# Patient Record
Sex: MALE | Race: WHITE | NOT HISPANIC OR LATINO | Employment: OTHER | URBAN - METROPOLITAN AREA
[De-identification: names, ages, dates, MRNs, and addresses within clinical notes are randomized per-mention and may not be internally consistent; named-entity substitution may affect disease eponyms.]

---

## 2021-08-03 ENCOUNTER — TELEPHONE (OUTPATIENT)
Dept: TRANSPLANT | Facility: CLINIC | Age: 61
End: 2021-08-03

## 2021-08-03 DIAGNOSIS — Z76.82 ORGAN TRANSPLANT CANDIDATE: Primary | ICD-10-CM

## 2021-08-04 ENCOUNTER — TELEPHONE (OUTPATIENT)
Dept: TRANSPLANT | Facility: CLINIC | Age: 61
End: 2021-08-04

## 2021-08-11 ENCOUNTER — TELEPHONE (OUTPATIENT)
Dept: TRANSPLANT | Facility: CLINIC | Age: 61
End: 2021-08-11

## 2021-08-12 ENCOUNTER — TELEPHONE (OUTPATIENT)
Dept: TRANSPLANT | Facility: CLINIC | Age: 61
End: 2021-08-12

## 2021-09-02 ENCOUNTER — TELEPHONE (OUTPATIENT)
Dept: TRANSPLANT | Facility: CLINIC | Age: 61
End: 2021-09-02

## 2021-09-13 ENCOUNTER — TELEPHONE (OUTPATIENT)
Dept: TRANSPLANT | Facility: CLINIC | Age: 61
End: 2021-09-13

## 2021-10-06 ENCOUNTER — OFFICE VISIT (OUTPATIENT)
Dept: TRANSPLANT | Facility: CLINIC | Age: 61
End: 2021-10-06
Payer: COMMERCIAL

## 2021-10-06 ENCOUNTER — HOSPITAL ENCOUNTER (OUTPATIENT)
Dept: RADIOLOGY | Facility: HOSPITAL | Age: 61
Discharge: HOME | End: 2021-10-06
Attending: NURSE PRACTITIONER
Payer: COMMERCIAL

## 2021-10-06 VITALS
HEART RATE: 72 BPM | OXYGEN SATURATION: 100 % | DIASTOLIC BLOOD PRESSURE: 86 MMHG | TEMPERATURE: 98 F | SYSTOLIC BLOOD PRESSURE: 138 MMHG | RESPIRATION RATE: 16 BRPM | WEIGHT: 207.88 LBS | HEIGHT: 70 IN | BODY MASS INDEX: 29.76 KG/M2

## 2021-10-06 DIAGNOSIS — N18.4 CKD (CHRONIC KIDNEY DISEASE) STAGE 4, GFR 15-29 ML/MIN: ICD-10-CM

## 2021-10-06 DIAGNOSIS — Z76.82 ORGAN TRANSPLANT CANDIDATE: ICD-10-CM

## 2021-10-06 DIAGNOSIS — I10 BENIGN HYPERTENSION: ICD-10-CM

## 2021-10-06 DIAGNOSIS — E78.5 DYSLIPIDEMIA: ICD-10-CM

## 2021-10-06 DIAGNOSIS — N02.B9 IGA NEPHROPATHY: Primary | ICD-10-CM

## 2021-10-06 DIAGNOSIS — R80.9 PROTEINURIA, UNSPECIFIED TYPE: ICD-10-CM

## 2021-10-06 PROBLEM — E11.29 TYPE 2 DIABETES MELLITUS WITH RENAL COMPLICATION: Status: ACTIVE | Noted: 2021-10-06

## 2021-10-06 LAB
CREAT UR-MCNC: 35 MG/DL (ref 23–375)
PROT UR-MCNC: 197 MG/DL (ref 0–15)
PROT/CREAT UR: 5.63 MG/G{CREAT} (ref 0–0.2)

## 2021-10-06 PROCEDURE — 97802 PR MED NUTR THER, 1ST, INDIV, EA 15 MIN: ICD-10-PCS | Mod: S$GLB,TXP,, | Performed by: DIETITIAN, REGISTERED

## 2021-10-06 PROCEDURE — 76770 US EXAM ABDO BACK WALL COMP: CPT | Mod: 26,TXP,, | Performed by: RADIOLOGY

## 2021-10-06 PROCEDURE — 97802 MEDICAL NUTRITION INDIV IN: CPT | Mod: S$GLB,TXP,, | Performed by: DIETITIAN, REGISTERED

## 2021-10-06 PROCEDURE — 99205 OFFICE O/P NEW HI 60 MIN: CPT | Mod: S$GLB,TXP,, | Performed by: INTERNAL MEDICINE

## 2021-10-06 PROCEDURE — 71046 X-RAY EXAM CHEST 2 VIEWS: CPT | Mod: TC,TXP

## 2021-10-06 PROCEDURE — 99999 PR PBB SHADOW E&M-EST. PATIENT-LVL IV: CPT | Mod: PBBFAC,TXP,, | Performed by: INTERNAL MEDICINE

## 2021-10-06 PROCEDURE — 72170 X-RAY EXAM OF PELVIS: CPT | Mod: TC,TXP

## 2021-10-06 PROCEDURE — 99203 OFFICE O/P NEW LOW 30 MIN: CPT | Mod: S$GLB,TXP,, | Performed by: TRANSPLANT SURGERY

## 2021-10-06 PROCEDURE — 72170 X-RAY EXAM OF PELVIS: CPT | Mod: 26,TXP,, | Performed by: RADIOLOGY

## 2021-10-06 PROCEDURE — 93978 VASCULAR STUDY: CPT | Mod: 26,TXP,, | Performed by: RADIOLOGY

## 2021-10-06 PROCEDURE — 99999 PR PBB SHADOW E&M-EST. PATIENT-LVL IV: ICD-10-PCS | Mod: PBBFAC,TXP,, | Performed by: INTERNAL MEDICINE

## 2021-10-06 PROCEDURE — 93978 VASCULAR STUDY: CPT | Mod: TC,TXP

## 2021-10-06 PROCEDURE — 99203 PR OFFICE/OUTPT VISIT, NEW, LEVL III, 30-44 MIN: ICD-10-PCS | Mod: S$GLB,TXP,, | Performed by: TRANSPLANT SURGERY

## 2021-10-06 PROCEDURE — 76770 US RETROPERITONEAL COMPLETE: ICD-10-PCS | Mod: 26,TXP,, | Performed by: RADIOLOGY

## 2021-10-06 PROCEDURE — 72170 XR PELVIS ROUTINE AP: ICD-10-PCS | Mod: 26,TXP,, | Performed by: RADIOLOGY

## 2021-10-06 PROCEDURE — 99205 PR OFFICE/OUTPT VISIT, NEW, LEVL V, 60-74 MIN: ICD-10-PCS | Mod: S$GLB,TXP,, | Performed by: INTERNAL MEDICINE

## 2021-10-06 PROCEDURE — 71046 XR CHEST PA AND LATERAL: ICD-10-PCS | Mod: 26,TXP,, | Performed by: RADIOLOGY

## 2021-10-06 PROCEDURE — 93978 US DOPP ILIACS BILATERAL: ICD-10-PCS | Mod: 26,TXP,, | Performed by: RADIOLOGY

## 2021-10-06 PROCEDURE — 76770 US EXAM ABDO BACK WALL COMP: CPT | Mod: 59,TC,TXP

## 2021-10-06 PROCEDURE — 71046 X-RAY EXAM CHEST 2 VIEWS: CPT | Mod: 26,TXP,, | Performed by: RADIOLOGY

## 2021-10-06 PROCEDURE — 82570 ASSAY OF URINE CREATININE: CPT | Mod: TXP | Performed by: STUDENT IN AN ORGANIZED HEALTH CARE EDUCATION/TRAINING PROGRAM

## 2021-10-06 RX ORDER — LOSARTAN POTASSIUM AND HYDROCHLOROTHIAZIDE 25; 100 MG/1; MG/1
1 TABLET ORAL DAILY
COMMUNITY
Start: 2021-04-08 | End: 2022-12-27

## 2021-10-06 RX ORDER — CLONAZEPAM 1 MG/1
1 TABLET ORAL NIGHTLY PRN
COMMUNITY
Start: 2021-08-19 | End: 2023-12-18

## 2021-10-06 RX ORDER — CARVEDILOL 25 MG/1
25 TABLET ORAL 2 TIMES DAILY
Status: ON HOLD | COMMUNITY
Start: 2021-09-12 | End: 2023-10-17 | Stop reason: SDUPTHER

## 2021-10-06 RX ORDER — PREDNISONE 5 MG/1
5 TABLET ORAL DAILY
COMMUNITY
Start: 2021-09-14 | End: 2022-12-27

## 2021-10-06 RX ORDER — FUROSEMIDE 40 MG/1
40 TABLET ORAL DAILY
Status: ON HOLD | COMMUNITY
Start: 2021-09-30 | End: 2023-10-17 | Stop reason: HOSPADM

## 2021-10-06 RX ORDER — INSULIN DEGLUDEC INJECTION 100 U/ML
10 INJECTION, SOLUTION SUBCUTANEOUS DAILY
Status: ON HOLD | COMMUNITY
Start: 2021-09-17 | End: 2023-10-17 | Stop reason: HOSPADM

## 2021-10-18 ENCOUNTER — SOCIAL WORK (OUTPATIENT)
Dept: TRANSPLANT | Facility: CLINIC | Age: 61
End: 2021-10-18

## 2021-11-08 DIAGNOSIS — Z76.82 ORGAN TRANSPLANT CANDIDATE: Primary | ICD-10-CM

## 2021-11-09 ENCOUNTER — TELEPHONE (OUTPATIENT)
Dept: TRANSPLANT | Facility: CLINIC | Age: 61
End: 2021-11-09
Payer: COMMERCIAL

## 2021-11-10 ENCOUNTER — LAB VISIT (OUTPATIENT)
Dept: LAB | Facility: HOSPITAL | Age: 61
End: 2021-11-10
Payer: COMMERCIAL

## 2021-11-10 DIAGNOSIS — Z76.82 ORGAN TRANSPLANT CANDIDATE: ICD-10-CM

## 2021-11-10 PROCEDURE — 86825 HLA X-MATH NON-CYTOTOXIC: CPT | Mod: 91,PO,TXP | Performed by: NURSE PRACTITIONER

## 2021-11-10 PROCEDURE — 86825 HLA X-MATH NON-CYTOTOXIC: CPT | Mod: PO,TXP | Performed by: NURSE PRACTITIONER

## 2021-11-18 LAB
B CELL RESULTS - XM ALLO: NEGATIVE
B MCS AVERAGE - XM ALLO: -7.7
DONOR MRN: NORMAL
FXMAL TESTING DATE: NORMAL
HLA FLOW CROSSMATCH (ALLO) INTERPRETATION: NORMAL
SERUM COLLECTION DT - XM ALLO: NORMAL
T CELL RESULTS - XM ALLO: NEGATIVE
T MCS AVERAGE - XM ALLO: -6.1

## 2021-11-24 ENCOUNTER — TELEPHONE (OUTPATIENT)
Dept: TRANSPLANT | Facility: CLINIC | Age: 61
End: 2021-11-24
Payer: COMMERCIAL

## 2021-11-24 DIAGNOSIS — Z76.82 ORGAN TRANSPLANT CANDIDATE: Primary | ICD-10-CM

## 2021-12-13 ENCOUNTER — EPISODE CHANGES (OUTPATIENT)
Dept: TRANSPLANT | Facility: CLINIC | Age: 61
End: 2021-12-13

## 2022-01-12 ENCOUNTER — TELEPHONE (OUTPATIENT)
Dept: TRANSPLANT | Facility: CLINIC | Age: 62
End: 2022-01-12
Payer: COMMERCIAL

## 2022-01-12 NOTE — TELEPHONE ENCOUNTER
Detail Level: Detailed Returned pt's call. This Rn explained to the pt the urology notes we rcv'd do not comment on clearance for transplant. Also made him aware we need cardiology clearance even though we have rcv'd the testing results. The pt voiced understanding and all questions were answered.   Quality 110: Preventive Care And Screening: Influenza Immunization: Influenza Immunization previously received during influenza season

## 2022-01-12 NOTE — TELEPHONE ENCOUNTER
----- Message from Nathaniel Alanis sent at 1/12/2022  9:38 AM CST -----  Regarding: call back  Pt call to Ritu in regards to receiving some test paperwork requesting call back    Call

## 2022-01-18 ENCOUNTER — TELEPHONE (OUTPATIENT)
Dept: TRANSPLANT | Facility: CLINIC | Age: 62
End: 2022-01-18
Payer: COMMERCIAL

## 2022-01-18 NOTE — TELEPHONE ENCOUNTER
Returned pt's call. Made him aware we are requesting clearance from cards and urology as well. The pt stated he spoke to both doctors and both should be sending clearance.

## 2022-01-18 NOTE — TELEPHONE ENCOUNTER
----- Message from Ritu Mcclelland RN sent at 1/14/2022  4:30 PM CST -----  Regarding: FW: clearance letter    ----- Message -----  From: Eloina Banda  Sent: 1/14/2022  11:49 AM CST  To: Ritu Mcclelland RN  Subject: FW: clearance letter                               ----- Message -----  From: Sarita Grullon  Sent: 1/14/2022  11:01 AM CST  To: [facility] Pre-Kidney Transplant Non-Clinical  Subject: clearance letter                                 Patient states that [facility],  [phone], is giving him a lot of trouble about getting a clearance letter from them.  Patient is wondering if his coordinator can give them a call to help rectify the situation.

## 2022-01-24 ENCOUNTER — EPISODE CHANGES (OUTPATIENT)
Dept: TRANSPLANT | Facility: CLINIC | Age: 62
End: 2022-01-24

## 2022-01-31 ENCOUNTER — TELEPHONE (OUTPATIENT)
Dept: TRANSPLANT | Facility: CLINIC | Age: 62
End: 2022-01-31
Payer: COMMERCIAL

## 2022-01-31 NOTE — TELEPHONE ENCOUNTER
Returned patient's call. Made him aware I will present his case on 2/4/22 and I will call him to let him know the decision. The pt voiced understanding and all questions were answered.

## 2022-01-31 NOTE — TELEPHONE ENCOUNTER
----- Message from Nathaniel Alanis sent at 1/31/2022  3:07 PM CST -----  Regarding: call back  Pt call in regards to transplant status    Call

## 2022-02-04 ENCOUNTER — TELEPHONE (OUTPATIENT)
Dept: TRANSPLANT | Facility: CLINIC | Age: 62
End: 2022-02-04
Payer: COMMERCIAL

## 2022-02-04 ENCOUNTER — COMMITTEE REVIEW (OUTPATIENT)
Dept: TRANSPLANT | Facility: CLINIC | Age: 62
End: 2022-02-04
Payer: COMMERCIAL

## 2022-02-04 NOTE — LETTER
February 4, 2022    Jannet Farias  [address] [place] [address]     Dear :    Patient: Heath Hernandez   MR Number: [id number]   YOB: 1960     Your patient, Heath Hernandez, was recently discussed at the [facility] Selection Committee meeting on . I am happy to inform you that Heath has been approved for transplantation.  He has met selection criteria for a kidney transplant related to CKD stage 4 secondary to primary diagnosis of Diabetes Mellitus - Type II. Your patient will be placed on the cadaveric wait list pending final financial approval from insurance company.     We appreciate your confidence in allowing us to participate in your patients care.  If you have any questions or concerns, please do not hesitate to contact me.    Sincerely,      Shena Gordon MD  Medical Director, Kidney & Kidney/Pancreas Transplantation    Tj/Enclosed    Cc:Jannet Farias MD

## 2022-02-04 NOTE — COMMITTEE REVIEW
Native Organ Dx: Diabetes Mellitus - Type II      SELECTION COMMITTEE NOTE    Heath Hernandez was presented at selection committee on 2/4/2022 .  Patient met selection criteria for kidney transplant related to CKD, Stage 4 due to  Diabetes Mellitus - Type II.  No absolute contraindications to transplant at this time.  Patient will be placed on the cadaveric wait list pending final financial approval from insurance company.  Patient will return to clinic for routine appointment in 1 year(s). Patient does not meet criteria for High KDPI kidney offer. Patient meets HCV+ kidney offer. Patient does not meet criteria for dual/enbloc, due to weight.      Notified the patient of the committee's decision. Verified home address and nephrologist. Will sent order to patient and nephrologist for second abo and request to have tubes sent to patient at time of listing for hla. Patient voiced understanding and all questions were answered.    Note written by MIKE Irene, RN    ===============================================    I was present at the meeting and attest to the decision of the committee

## 2022-02-12 LAB
ABO GROUP BLD: NORMAL
RH BLD: POSITIVE

## 2022-03-02 ENCOUNTER — TELEPHONE (OUTPATIENT)
Dept: TRANSPLANT | Facility: CLINIC | Age: 62
End: 2022-03-02
Payer: COMMERCIAL

## 2022-03-02 DIAGNOSIS — Z76.82 ORGAN TRANSPLANT CANDIDATE: Primary | ICD-10-CM

## 2022-03-02 NOTE — TELEPHONE ENCOUNTER
"  KIDNEY WAIT LISTING NOTE    Date of Financial clearance to list: 22    N/[facility]:     Organ: Kidney  Name:       Heath Hernandez   : 1960          Gender:     male    MRN#: [id number]                                 State of Permanent Residence:  [address] [place] [id number]  Ethnicity: Not  or /a   Race:      White    CLINICAL INFORMATION   Candidate Medical Urgency Status: Active (1)  Number of Previous Kidney Transplants: 0  Number of Previous Solid Organ Transplants: 0  Did you enter number of previous kidney or other solid organ transplants? yes  Is this Candidate a Prior Living Donor: no  (If yes, please generate letter to [employer] with patient's date of donation, recipient SSN, signed by Surgical Director after patient is listed in order to receive priority points).      ABO  ABO Blood Group:   O     ABO Confirmation: (THESE DATES MUST BE PRIOR TO THE LIST DATE AND SUPPORTED BY SEPARATE LAB REPORTS)    Internal Results    Lab Results   Component Value Date    GROUPTRH O POS 10/06/2021     Lab Results   Component Value Date    ABO O 2022       External Results    ABO Date 1: 2022   ABO Date 2  Are either of these ABO results based on External Labs? yes  (If Yes, STOP and go to source document in Media Tab for verification).    VITALS  Height:  5'9"  Weight:  94.3kg  (Use height from Transplant clinic visits only).  Did you enter height/weight? Yes    HLA    Class I:  Lab Results   Component Value Date    ZRWT0QL 1 10/06/2021    XTTG1UW 25 10/06/2021    DHKV7GG 44 10/06/2021    ROTE0QG 57 10/06/2021    YYXWA3IQ 4 10/06/2021    LYBFS5WA XX 10/06/2021    GYNYI1QQ 5 10/06/2021    CEQGX7WF 7 10/06/2021       Class II:  Lab Results   Component Value Date    ONJQYO78MK 4 10/06/2021    AHCNXH91UP 7 10/06/2021    JFTDUA283XL 53 10/06/2021    RLELFS8499 XX 10/06/2021    NEJTI9GO 7 10/06/2021    BDVHC5PN 9 10/06/2021       Tested for HLA Antibodies: Yes, no antibodies " "detected     If result is "Positive" antibodies are detected     If result is "Negative or questionable" no antibodies detected    Lab Results   Component Value Date    CIPRAS Negative 10/06/2021    CIIPRAS Negative 10/06/2021       DIALYSIS INFORMATION  Is patient Pre-Dialysis: Yes     GFR Information  Report GFR being used as the criteria for placement on the kidney list. If not, leave blank  GFR < or = 20 ml/min? Yes  If Yes, Specify value  17  ml/min     Initial date GFR became 20 or less: 10/6/2021  Is GFR obtained from an Outside lab Result? No  (If YES verify with source document scanned into media)    If patient on Dialysis:    Is candidate currently on dialysis for ESRD? No  If Yes,  Date Chronic Dialysis Started:   (Not currently on dialysis)  (verify with source document in Media Tab)   Dialysis Unit Name: Pre Dialysis [facility] draw                        Physician Name:  Dr. Shena Hillman  [facility]#: [id number]    DIABETES INFORMATION  Primary Native Kidney Diagnosis: Diabetes Mellitus - Type II  C-Peptide Value - No results found for: CPEPTIDE  Current Diabetes Status: None    FOR NON-KIDNEY DEPARTMENT USE ONLY:  Additional Organs Registered? none    Maximum Acceptable Number of HLA Mismatches  ABDR:     6      (0-6)               AB:               (0-4)  ADR:   _____  (0-4)              BDR: _____ (0-4)  A:        _____  (0-2)              B:      _____ (0-2)          DR: ______ (0-2)    Will Recipient Accept?   Accept HBcAB Positive Organ:            Yes  Accept HBV IKE Positive Organ:        no  Accept HCV Antibody Positive Organ: yes   Accept HCV IKE Positive Organ: yes    Dual Kidney and En Bloc Opt In : No  Dual  Local:   No  Dual Import:   No  En Bloc Local:   No  En Bloc Import: No     Accept KDPI > 85: Single: No     Local: No     Import: No  Accept KDPI > 85: Dual: No     Local: No     Import: No      ### NURSE TO VERIFY CONSENT AND MAKE ANY NECESSARY CHANGES NEEDED IN UNET AT THE TIME " OF VERIFICATION ###    Unacceptible Antigens  If yes, list     No results found for: NH5SFNA, CIABCLM, CIIAB    ### DO NOT LIST IF ANTIGEN VALUE WEAK ###     abo, serologies and egfr verified by myself and NICKY Solomon RN

## 2022-03-03 DIAGNOSIS — Z76.82 ORGAN TRANSPLANT CANDIDATE: Primary | ICD-10-CM

## 2022-04-19 PROCEDURE — 86832 HLA CLASS I HIGH DEFIN QUAL: CPT | Mod: PO,TXP | Performed by: NURSE PRACTITIONER

## 2022-04-19 PROCEDURE — 86833 HLA CLASS II HIGH DEFIN QUAL: CPT | Mod: PO,TXP | Performed by: NURSE PRACTITIONER

## 2022-04-21 ENCOUNTER — LAB VISIT (OUTPATIENT)
Dept: LAB | Facility: HOSPITAL | Age: 62
End: 2022-04-21
Payer: COMMERCIAL

## 2022-04-21 DIAGNOSIS — Z76.82 ORGAN TRANSPLANT CANDIDATE: ICD-10-CM

## 2022-05-05 ENCOUNTER — TELEPHONE (OUTPATIENT)
Dept: TRANSPLANT | Facility: CLINIC | Age: 62
End: 2022-05-05
Payer: COMMERCIAL

## 2022-05-05 DIAGNOSIS — Z76.82 ORGAN TRANSPLANT CANDIDATE: Primary | ICD-10-CM

## 2022-05-11 LAB — HPRA INTERPRETATION: NORMAL

## 2022-05-17 PROCEDURE — 99001 SPECIMEN HANDLING PT-LAB: CPT | Mod: PO,TXP | Performed by: NURSE PRACTITIONER

## 2022-05-19 ENCOUNTER — LAB VISIT (OUTPATIENT)
Dept: LAB | Facility: HOSPITAL | Age: 62
End: 2022-05-19
Payer: COMMERCIAL

## 2022-05-19 DIAGNOSIS — Z76.82 ORGAN TRANSPLANT CANDIDATE: ICD-10-CM

## 2022-06-01 LAB
CLASS I ANTIBODIES - LUMINEX: NEGATIVE
CLASS II ANTIBODIES - LUMINEX: NORMAL
CLASS II ANTIBODY COMMENTS - LUMINEX: NORMAL
CPRA %: 0
SERUM COLLECTION DT - LUMINEX CLASS I: NORMAL
SERUM COLLECTION DT - LUMINEX CLASS II: NORMAL
SPCL1 TESTING DATE: NORMAL
SPCL2 TESTING DATE: NORMAL
SPCLU TESTING DATE: NORMAL

## 2022-06-08 LAB
HLA FREEZE AND HOLD INTERPRETATION: NORMAL
HLAFH COLLECTION DATE: NORMAL
HPRA INTERPRETATION: NORMAL

## 2022-06-27 PROCEDURE — 86833 HLA CLASS II HIGH DEFIN QUAL: CPT | Mod: PO,TXP | Performed by: NURSE PRACTITIONER

## 2022-06-27 PROCEDURE — 86832 HLA CLASS I HIGH DEFIN QUAL: CPT | Mod: PO,TXP | Performed by: NURSE PRACTITIONER

## 2022-06-30 ENCOUNTER — LAB VISIT (OUTPATIENT)
Dept: LAB | Facility: HOSPITAL | Age: 62
End: 2022-06-30
Payer: COMMERCIAL

## 2022-06-30 DIAGNOSIS — Z76.82 ORGAN TRANSPLANT CANDIDATE: ICD-10-CM

## 2022-07-11 LAB — HPRA INTERPRETATION: NORMAL

## 2022-07-18 PROCEDURE — 99001 SPECIMEN HANDLING PT-LAB: CPT | Mod: PO,TXP | Performed by: NURSE PRACTITIONER

## 2022-07-20 ENCOUNTER — LAB VISIT (OUTPATIENT)
Dept: LAB | Facility: HOSPITAL | Age: 62
End: 2022-07-20
Payer: COMMERCIAL

## 2022-07-20 DIAGNOSIS — Z76.82 ORGAN TRANSPLANT CANDIDATE: ICD-10-CM

## 2022-07-29 LAB
CLASS I ANTIBODY COMMENTS - LUMINEX: NORMAL
CLASS II ANTIBODIES - LUMINEX: NORMAL
CLASS II ANTIBODY COMMENTS - LUMINEX: NORMAL
CPRA %: 0
SERUM COLLECTION DT - LUMINEX CLASS I: NORMAL
SERUM COLLECTION DT - LUMINEX CLASS II: NORMAL
SPCL1 TESTING DATE: NORMAL
SPCL2 TESTING DATE: NORMAL
SPCLU TESTING DATE: NORMAL

## 2022-08-29 PROCEDURE — 86832 HLA CLASS I HIGH DEFIN QUAL: CPT | Mod: PO,TXP | Performed by: NURSE PRACTITIONER

## 2022-08-29 PROCEDURE — 86833 HLA CLASS II HIGH DEFIN QUAL: CPT | Mod: PO,TXP | Performed by: NURSE PRACTITIONER

## 2022-09-01 ENCOUNTER — LAB VISIT (OUTPATIENT)
Dept: LAB | Facility: HOSPITAL | Age: 62
End: 2022-09-01
Payer: COMMERCIAL

## 2022-09-01 DIAGNOSIS — Z76.82 ORGAN TRANSPLANT CANDIDATE: ICD-10-CM

## 2022-09-08 LAB — HPRA INTERPRETATION: NORMAL

## 2022-09-21 PROCEDURE — 99001 SPECIMEN HANDLING PT-LAB: CPT | Mod: PO,TXP | Performed by: NURSE PRACTITIONER

## 2022-09-23 ENCOUNTER — LAB VISIT (OUTPATIENT)
Dept: LAB | Facility: HOSPITAL | Age: 62
End: 2022-09-23
Payer: COMMERCIAL

## 2022-09-23 DIAGNOSIS — Z76.82 ORGAN TRANSPLANT CANDIDATE: ICD-10-CM

## 2022-09-26 DIAGNOSIS — Z76.82 ORGAN TRANSPLANT CANDIDATE: Primary | ICD-10-CM

## 2022-09-26 NOTE — PROGRESS NOTES
YEARLY LIST MANAGEMENT NOTE    Heath David's kidney transplant listing status reviewed.  Patient is due for follow-up appointments in December 2022.  Appointments will be scheduled per protocol.  HLA sample is current and being rec'd on a regular basis.

## 2022-09-28 ENCOUNTER — TELEPHONE (OUTPATIENT)
Dept: TRANSPLANT | Facility: CLINIC | Age: 62
End: 2022-09-28
Payer: COMMERCIAL

## 2022-09-30 ENCOUNTER — TELEPHONE (OUTPATIENT)
Dept: TRANSPLANT | Facility: CLINIC | Age: 62
End: 2022-09-30
Payer: COMMERCIAL

## 2022-10-04 PROCEDURE — 86832 HLA CLASS I HIGH DEFIN QUAL: CPT | Mod: PO,TXP | Performed by: NURSE PRACTITIONER

## 2022-10-04 PROCEDURE — 86833 HLA CLASS II HIGH DEFIN QUAL: CPT | Mod: PO,TXP | Performed by: NURSE PRACTITIONER

## 2022-10-11 ENCOUNTER — LAB VISIT (OUTPATIENT)
Dept: LAB | Facility: HOSPITAL | Age: 62
End: 2022-10-11
Payer: COMMERCIAL

## 2022-10-11 DIAGNOSIS — Z76.82 ORGAN TRANSPLANT CANDIDATE: ICD-10-CM

## 2022-10-14 LAB — HPRA INTERPRETATION: NORMAL

## 2022-11-01 PROCEDURE — 99001 SPECIMEN HANDLING PT-LAB: CPT | Mod: PO,TXP | Performed by: NURSE PRACTITIONER

## 2022-11-03 ENCOUNTER — LAB VISIT (OUTPATIENT)
Dept: LAB | Facility: HOSPITAL | Age: 62
End: 2022-11-03
Payer: COMMERCIAL

## 2022-11-03 DIAGNOSIS — Z76.82 ORGAN TRANSPLANT CANDIDATE: ICD-10-CM

## 2022-11-15 LAB
CLASS I ANTIBODY COMMENTS - LUMINEX: NORMAL
CLASS II ANTIBODIES - LUMINEX: NEGATIVE
CPRA %: 0
SERUM COLLECTION DT - LUMINEX CLASS I: NORMAL
SERUM COLLECTION DT - LUMINEX CLASS II: NORMAL
SPCL1 TESTING DATE: NORMAL
SPCL2 TESTING DATE: NORMAL
SPCLU TESTING DATE: NORMAL

## 2022-11-30 ENCOUNTER — TELEPHONE (OUTPATIENT)
Dept: TRANSPLANT | Facility: CLINIC | Age: 62
End: 2022-11-30
Payer: COMMERCIAL

## 2022-12-27 ENCOUNTER — OFFICE VISIT (OUTPATIENT)
Dept: TRANSPLANT | Facility: CLINIC | Age: 62
End: 2022-12-27
Payer: COMMERCIAL

## 2022-12-27 ENCOUNTER — HOSPITAL ENCOUNTER (OUTPATIENT)
Dept: RADIOLOGY | Facility: HOSPITAL | Age: 62
Discharge: HOME | End: 2022-12-27
Attending: NURSE PRACTITIONER
Payer: COMMERCIAL

## 2022-12-27 ENCOUNTER — TELEPHONE (OUTPATIENT)
Dept: TRANSPLANT | Facility: CLINIC | Age: 62
End: 2022-12-27
Payer: COMMERCIAL

## 2022-12-27 ENCOUNTER — HOSPITAL ENCOUNTER (OUTPATIENT)
Dept: CARDIOLOGY | Facility: HOSPITAL | Age: 62
Discharge: HOME | End: 2022-12-27
Attending: NURSE PRACTITIONER
Payer: COMMERCIAL

## 2022-12-27 VITALS
WEIGHT: 207.88 LBS | BODY MASS INDEX: 30.79 KG/M2 | HEART RATE: 77 BPM | SYSTOLIC BLOOD PRESSURE: 130 MMHG | HEIGHT: 69 IN | DIASTOLIC BLOOD PRESSURE: 74 MMHG

## 2022-12-27 VITALS
SYSTOLIC BLOOD PRESSURE: 179 MMHG | HEIGHT: 70 IN | BODY MASS INDEX: 29.22 KG/M2 | TEMPERATURE: 97 F | OXYGEN SATURATION: 98 % | HEART RATE: 81 BPM | DIASTOLIC BLOOD PRESSURE: 85 MMHG | RESPIRATION RATE: 16 BRPM | WEIGHT: 204.13 LBS

## 2022-12-27 DIAGNOSIS — E11.22 TYPE 2 DIABETES MELLITUS WITH STAGE 4 CHRONIC KIDNEY DISEASE, UNSPECIFIED WHETHER LONG TERM INSULIN USE: ICD-10-CM

## 2022-12-27 DIAGNOSIS — Z76.82 PATIENT ON WAITING LIST FOR KIDNEY TRANSPLANT: ICD-10-CM

## 2022-12-27 DIAGNOSIS — Z76.82 ORGAN TRANSPLANT CANDIDATE: ICD-10-CM

## 2022-12-27 DIAGNOSIS — E66.09 CLASS 1 OBESITY DUE TO EXCESS CALORIES WITH SERIOUS COMORBIDITY AND BODY MASS INDEX (BMI) OF 30.0 TO 30.9 IN ADULT: ICD-10-CM

## 2022-12-27 DIAGNOSIS — N18.4 CKD (CHRONIC KIDNEY DISEASE) STAGE 4, GFR 15-29 ML/MIN: Primary | ICD-10-CM

## 2022-12-27 DIAGNOSIS — I10 BENIGN HYPERTENSION: ICD-10-CM

## 2022-12-27 DIAGNOSIS — N18.4 TYPE 2 DIABETES MELLITUS WITH STAGE 4 CHRONIC KIDNEY DISEASE, UNSPECIFIED WHETHER LONG TERM INSULIN USE: ICD-10-CM

## 2022-12-27 PROBLEM — E66.811 CLASS 1 OBESITY DUE TO EXCESS CALORIES WITH SERIOUS COMORBIDITY AND BODY MASS INDEX (BMI) OF 30.0 TO 30.9 IN ADULT: Status: ACTIVE | Noted: 2022-12-27

## 2022-12-27 LAB
ASCENDING AORTA: 3.04 CM
AV INDEX (PROSTH): 0.78
AV MEAN GRADIENT: 4 MMHG
AV PEAK GRADIENT: 7 MMHG
AV VALVE AREA: 4.02 CM2
AV VELOCITY RATIO: 0.79
BSA FOR ECHO PROCEDURE: 2.14 M2
CV ECHO LV RWT: 0.33 CM
DOP CALC AO PEAK VEL: 1.36 M/S
DOP CALC AO VTI: 28.62 CM
DOP CALC LVOT AREA: 5.2 CM2
DOP CALC LVOT DIAMETER: 2.57 CM
DOP CALC LVOT PEAK VEL: 1.07 M/S
DOP CALC LVOT STROKE VOLUME: 115.16 CM3
DOP CALCLVOT PEAK VEL VTI: 22.21 CM
E WAVE DECELERATION TIME: 219.14 MSEC
E/A RATIO: 0.75
E/E' RATIO: 11.08 M/S
ECHO LV POSTERIOR WALL: 0.97 CM (ref 0.6–1.1)
EJECTION FRACTION: 60 %
FRACTIONAL SHORTENING: 31 % (ref 28–44)
INTERVENTRICULAR SEPTUM: 1.01 CM (ref 0.6–1.1)
IVRT: 114.18 MSEC
LA MAJOR: 6.12 CM
LA MINOR: 6.35 CM
LA WIDTH: 4.65 CM
LEFT ATRIUM SIZE: 4.03 CM
LEFT ATRIUM VOLUME INDEX: 47.3 ML/M2
LEFT ATRIUM VOLUME: 99.28 CM3
LEFT INTERNAL DIMENSION IN SYSTOLE: 4.02 CM (ref 2.1–4)
LEFT VENTRICLE DIASTOLIC VOLUME INDEX: 79.2 ML/M2
LEFT VENTRICLE DIASTOLIC VOLUME: 166.31 ML
LEFT VENTRICLE MASS INDEX: 110 G/M2
LEFT VENTRICLE SYSTOLIC VOLUME INDEX: 33.8 ML/M2
LEFT VENTRICLE SYSTOLIC VOLUME: 71 ML
LEFT VENTRICULAR INTERNAL DIMENSION IN DIASTOLE: 5.8 CM (ref 3.5–6)
LEFT VENTRICULAR MASS: 230.06 G
LV LATERAL E/E' RATIO: 9 M/S
LV SEPTAL E/E' RATIO: 14.4 M/S
MV A" WAVE DURATION": 6.28 MSEC
MV PEAK A VEL: 0.96 M/S
MV PEAK E VEL: 0.72 M/S
MV STENOSIS PRESSURE HALF TIME: 63.55 MS
MV VALVE AREA P 1/2 METHOD: 3.46 CM2
PISA TR MAX VEL: 3.07 M/S
PULM VEIN S/D RATIO: 1.57
PV PEAK D VEL: 0.35 M/S
PV PEAK S VEL: 0.55 M/S
RA MAJOR: 5.83 CM
RA PRESSURE: 3 MMHG
RA WIDTH: 3.63 CM
RIGHT VENTRICULAR END-DIASTOLIC DIMENSION: 4.63 CM
RV TISSUE DOPPLER FREE WALL SYSTOLIC VELOCITY 1 (APICAL 4 CHAMBER VIEW): 12.79 CM/S
SINUS: 4.14 CM
STJ: 2.78 CM
TDI LATERAL: 0.08 M/S
TDI SEPTAL: 0.05 M/S
TDI: 0.07 M/S
TR MAX PG: 38 MMHG
TRICUSPID ANNULAR PLANE SYSTOLIC EXCURSION: 2.57 CM
TV REST PULMONARY ARTERY PRESSURE: 41 MMHG

## 2022-12-27 PROCEDURE — 93306 TTE W/DOPPLER COMPLETE: CPT | Mod: 26,TXP,, | Performed by: INTERNAL MEDICINE

## 2022-12-27 PROCEDURE — 1159F PR MEDICATION LIST DOCUMENTED IN MEDICAL RECORD: ICD-10-PCS | Mod: CPTII,S$GLB,TXP, | Performed by: NURSE PRACTITIONER

## 2022-12-27 PROCEDURE — 93306 TTE W/DOPPLER COMPLETE: CPT | Mod: TXP

## 2022-12-27 PROCEDURE — 3077F SYST BP >= 140 MM HG: CPT | Mod: CPTII,S$GLB,TXP, | Performed by: NURSE PRACTITIONER

## 2022-12-27 PROCEDURE — 86832 HLA CLASS I HIGH DEFIN QUAL: CPT | Mod: PO,TXP | Performed by: NURSE PRACTITIONER

## 2022-12-27 PROCEDURE — 99215 PR OFFICE/OUTPT VISIT, EST, LEVL V, 40-54 MIN: ICD-10-PCS | Mod: S$GLB,TXP,, | Performed by: NURSE PRACTITIONER

## 2022-12-27 PROCEDURE — 3079F PR MOST RECENT DIASTOLIC BLOOD PRESSURE 80-89 MM HG: ICD-10-PCS | Mod: CPTII,S$GLB,TXP, | Performed by: NURSE PRACTITIONER

## 2022-12-27 PROCEDURE — 3077F PR MOST RECENT SYSTOLIC BLOOD PRESSURE >= 140 MM HG: ICD-10-PCS | Mod: CPTII,S$GLB,TXP, | Performed by: NURSE PRACTITIONER

## 2022-12-27 PROCEDURE — 3008F BODY MASS INDEX DOCD: CPT | Mod: CPTII,S$GLB,TXP, | Performed by: NURSE PRACTITIONER

## 2022-12-27 PROCEDURE — 99999 PR PBB SHADOW E&M-EST. PATIENT-LVL V: CPT | Mod: PBBFAC,TXP,, | Performed by: NURSE PRACTITIONER

## 2022-12-27 PROCEDURE — 76770 US EXAM ABDO BACK WALL COMP: CPT | Mod: 26,TXP,, | Performed by: RADIOLOGY

## 2022-12-27 PROCEDURE — 99999 PR PBB SHADOW E&M-EST. PATIENT-LVL V: ICD-10-PCS | Mod: PBBFAC,TXP,, | Performed by: NURSE PRACTITIONER

## 2022-12-27 PROCEDURE — 99215 OFFICE O/P EST HI 40 MIN: CPT | Mod: S$GLB,TXP,, | Performed by: NURSE PRACTITIONER

## 2022-12-27 PROCEDURE — 4010F ACE/ARB THERAPY RXD/TAKEN: CPT | Mod: CPTII,S$GLB,TXP, | Performed by: NURSE PRACTITIONER

## 2022-12-27 PROCEDURE — 76770 US RETROPERITONEAL COMPLETE: ICD-10-PCS | Mod: 26,TXP,, | Performed by: RADIOLOGY

## 2022-12-27 PROCEDURE — 4010F PR ACE/ARB THEARPY RXD/TAKEN: ICD-10-PCS | Mod: CPTII,S$GLB,TXP, | Performed by: NURSE PRACTITIONER

## 2022-12-27 PROCEDURE — 3079F DIAST BP 80-89 MM HG: CPT | Mod: CPTII,S$GLB,TXP, | Performed by: NURSE PRACTITIONER

## 2022-12-27 PROCEDURE — 93306 ECHO (CUPID ONLY): ICD-10-PCS | Mod: 26,TXP,, | Performed by: INTERNAL MEDICINE

## 2022-12-27 PROCEDURE — 3008F PR BODY MASS INDEX (BMI) DOCUMENTED: ICD-10-PCS | Mod: CPTII,S$GLB,TXP, | Performed by: NURSE PRACTITIONER

## 2022-12-27 PROCEDURE — 86833 HLA CLASS II HIGH DEFIN QUAL: CPT | Mod: PO,TXP | Performed by: NURSE PRACTITIONER

## 2022-12-27 PROCEDURE — 1160F RVW MEDS BY RX/DR IN RCRD: CPT | Mod: CPTII,S$GLB,TXP, | Performed by: NURSE PRACTITIONER

## 2022-12-27 PROCEDURE — 1160F PR REVIEW ALL MEDS BY PRESCRIBER/CLIN PHARMACIST DOCUMENTED: ICD-10-PCS | Mod: CPTII,S$GLB,TXP, | Performed by: NURSE PRACTITIONER

## 2022-12-27 PROCEDURE — 1159F MED LIST DOCD IN RCRD: CPT | Mod: CPTII,S$GLB,TXP, | Performed by: NURSE PRACTITIONER

## 2022-12-27 PROCEDURE — 76770 US EXAM ABDO BACK WALL COMP: CPT | Mod: TC,TXP

## 2022-12-27 RX ORDER — LOSARTAN POTASSIUM 100 MG/1
100 TABLET ORAL DAILY
Status: ON HOLD | COMMUNITY
Start: 2022-11-23 | End: 2023-10-17 | Stop reason: HOSPADM

## 2022-12-27 RX ORDER — FERRIC CITRATE 210 MG/1
2 TABLET, COATED ORAL DAILY
Status: ON HOLD | COMMUNITY
Start: 2022-12-20 | End: 2023-10-17 | Stop reason: HOSPADM

## 2022-12-27 RX ORDER — FOLIC ACID/VIT B COMPLEX AND C 0.8 MG
1 TABLET ORAL DAILY
Status: ON HOLD | COMMUNITY
Start: 2022-12-21 | End: 2023-10-17 | Stop reason: HOSPADM

## 2022-12-27 RX ORDER — SODIUM BICARBONATE 650 MG/1
1 TABLET ORAL 3 TIMES DAILY
Status: ON HOLD | COMMUNITY
Start: 2022-09-19 | End: 2023-10-17 | Stop reason: HOSPADM

## 2022-12-27 RX ORDER — SEVELAMER CARBONATE 800 MG/1
2400 TABLET, FILM COATED ORAL 2 TIMES DAILY
Status: ON HOLD | COMMUNITY
Start: 2022-11-16 | End: 2023-10-17 | Stop reason: HOSPADM

## 2022-12-27 RX ORDER — AMLODIPINE BESYLATE 5 MG/1
10 TABLET ORAL DAILY
Status: ON HOLD | COMMUNITY
Start: 2022-10-28 | End: 2023-10-17 | Stop reason: HOSPADM

## 2022-12-27 NOTE — PROGRESS NOTES
Kidney Transplant Recipient Reevalulation    Referring Physician: Jannet Farias  Current Nephrologist: Jannet Farias  Waitlist Status: active  Dialysis Start Date: (Not currently on dialysis)    Subjective:     CC:  Annual reassessment of kidney transplant candidacy.    HPI:  Mr. Hernandez is a 62 y.o. year old White male with ESRD secondary to diabetic nephropathy.  He has been on the wait list for a kidney transplant at [facility] since 3/2/2022. Patient is currently~  on peritoneal dialysis started on 8. Patient is dialyzing on manually, ~ 3 hours soak time--12 hours total.  Patient reports that he is tolerating dialysis well. . He has a PD catheter. Patient denies any recent hospitalizations or ED visits.   Denies peritonitis     Last seen by txp 10/7/21  Fx assessment: working from home,    Looks good not frail     Lab /diagnostic results/ txp wk up  reviewed with patient today.    10/6/21 PXR No evidence of an unusually advanced degree of iliac arterial vascular calcification in the pelvis  3/17/20 colonoscopy--repeat due 10/2024  10/6/21 renal uS Bosniak 2F complex cyst in the right middle pole measuring up to 2 cm. Recommend further assessment with contrast dedicated root renal ultrasound or contrast MR or CT. Multiple left simple renal cysts.     Urology      ([place]) 21 Rosa Bynum MD:  Assessment & Plan   Note: He returns to discuss the results of his Abdominal MRI 21. It showed small bilateral renal cyst hemorrhagia. It also showed an indeterminate lession 2.1 cm to the right hepatic lobe. He comes in for evaluation of the renal cyst due to needing a kidney transplants for CKD Stage 5   LAST EDITED BY ROSA BYNUM MD     To whom it may concern, Mr. Heath Hernandez,, 1960, has been cleared for surgery     22 TTE  Summary    The left ventricle is mildly enlarged with normal systolic function.  The estimated ejection fraction is 60%.  There is mildly  "abnormal septal wall motion with small areas of quite mild wall motion abnormality .  Moderate left atrial enlargement.  Grade I left ventricular diastolic dysfunction.  Mild right ventricular enlargement.  Moderate right atrial enlargement.  Mild tricuspid regurgitation.  Normal central venous pressure (3 mmHg).  The estimated PA systolic pressure is 41 mmHg.  There is mild pulmonary hypertension.  Small posterior pericardial effusion. Trivial under the RA.     STRESS TEST      (Outside) 11/4/21  ECG Stress:  The ECG chemical " stress portion of the study was normal.  Nuclear Perfusion  The nuclear portion of the study was abnormal.   EF/Regional Thickening;  The ejection fraction and regional thickening on the study was normal.  Overall Conclusion of Test;  Nuclear stress test with no reversible ischemia noted on imaging. Stress portion adequate, and noted as above.     Past Medical History:   Diagnosis Date    Anxiety     Cancer     Skin Cancer    Diabetes mellitus     Diabetes mellitus, type 2     Disorder of kidney and ureter     Hyperlipidemia     Hypertension        Review of Systems   Constitutional:  Negative for activity change, appetite change, chills, fatigue, fever and unexpected weight change.   HENT:  Negative for congestion, facial swelling, postnasal drip, rhinorrhea, sinus pressure, sore throat and trouble swallowing.    Eyes:  Negative for pain, redness and visual disturbance.   Respiratory:  Negative for cough, chest tightness, shortness of breath and wheezing.    Cardiovascular: Negative.  Negative for chest pain, palpitations and leg swelling.   Gastrointestinal:  Negative for abdominal pain, diarrhea, nausea and vomiting.        PD catheter   Genitourinary:  Negative for dysuria, flank pain and urgency.        Still makes a good amount of urine    Musculoskeletal:  Negative for gait problem, neck pain and neck stiffness.   Skin:  Negative for rash.   Allergic/Immunologic: Negative for " "environmental allergies, food allergies and immunocompromised state.   Neurological:  Negative for dizziness, weakness, light-headedness and headaches.   Psychiatric/Behavioral:  Negative for agitation and confusion. The patient is not nervous/anxious.      Objective:  BP (!) 179/85 (BP Location: Right arm, Patient Position: Sitting, BP Method: Medium (Automatic))   Pulse 81   Temp 97.3 °F (36.3 °C) (Temporal)   Resp 16   Ht 5' 9.88" (1.775 m)   Wt 92.6 kg (204 lb 2.3 oz)   SpO2 98%   BMI 29.39 kg/m²      body mass index is 29.39 kg/m².    Physical Exam  Vitals reviewed.   Constitutional:       Appearance: Normal appearance. He is well-developed.   HENT:      Head: Normocephalic.   Eyes:      Pupils: Pupils are equal, round, and reactive to light.   Cardiovascular:      Rate and Rhythm: Normal rate and regular rhythm.      Heart sounds: Normal heart sounds.   Pulmonary:      Effort: Pulmonary effort is normal.      Breath sounds: Normal breath sounds.   Abdominal:      General: Bowel sounds are normal. There is distension.      Palpations: Abdomen is soft.      Comments: PD catheter    Musculoskeletal:         General: Normal range of motion.      Cervical back: Normal range of motion and neck supple.   Skin:     General: Skin is warm and dry.   Neurological:      Mental Status: He is alert and oriented to person, place, and time.      Motor: No abnormal muscle tone.   Psychiatric:         Behavior: Behavior normal.       Labs:  Lab Results   Component Value Date    WBC 8.31 10/06/2021    HGB 13.2 (L) 10/06/2021    HCT 36.8 (L) 10/06/2021     (L) 10/06/2021    K 3.4 (L) 10/06/2021    CL 91 (L) 10/06/2021    CO2 24 10/06/2021    BUN 48 (H) 10/06/2021    CREATININE 3.7 (H) 10/06/2021    EGFRNONAA 17.0 (A) 10/06/2021    CALCIUM 9.0 10/06/2021    PHOS 4.4 10/06/2021    ALBUMIN 3.1 (L) 10/06/2021    AST 20 10/06/2021    ALT 24 10/06/2021    UTPCR 5.63 (H) 10/06/2021    PTH 96.9 (H) 10/06/2021       No " results found for: PREALBUMIN, BILIRUBINUA, GGT, AMYLASE, LIPASE, PROTEINUA, NITRITE, RBCUA, WBCUA    No results found for: HLAABCTYPE    Lab Results   Component Value Date    CPRA 0 10/04/2022    CPRA 0 10/04/2022    CPRA 0 10/04/2022    GF8QAQY Negative 04/19/2022    CIABCLM WEAK---A34 10/04/2022    CIIAB Negative 10/04/2022    ABCMT WEAK DP1 08/29/2022       Labs were reviewed with the patient.    Pre-transplant Workup:   Reviewed with the patient.    Assessment:     1. CKD (chronic kidney disease) stage 4, GFR 15-29 ml/min    2. Patient on waiting list for kidney transplant    3. Benign hypertension    4. Type 2 diabetes mellitus with stage 4 chronic kidney disease, unspecified whether long term insulin use    5. Class 1 obesity due to excess calories with serious comorbidity and body mass index (BMI) of 30.0 to 30.9 in adult        Plan:     Cardiology referral Pt follows with Dr Cuauhtemoc Cash MD   mildly abnormal septal wall motion with small areas of quite mild wall motion abnormality .  Pulm HTN  Fax labs to dialysis/nephrologist concerning  pulm HTN   Repeat renal; us at 4pm        Transplant Candidacy:   Mr. Hernandez is a suitable kidney transplant candidate.  Meets center eligibility for accepting HCV+ donor offer - yes.  Patient educated on HCV+ donors. Heath is willing  to accept HCV+ donor offer -  yes   Patient is a candidate for KDPI > 85 kidney donor offer - no d/t weight  He remains in overall stable health, and will remain active on the transplant list.    Patient advised that it is recommended that all transplant candidates, and their close contacts and household members receive Covid vaccination.    Colette Zhou NP       Follow-up:   In addition to the tests noted in the plan, Mr. Hernandez will continue to have reevaluation as per the standing pre-kidney transplant protocol:  Monthly blood for PRA  Annual return to clinic, except HIV positive, > 65 years of age, or pancreas transplant  candidates who will be scheduled to see transplant every 6 months while in pre-transplant phase  Annual re-testing: CXR, EKG, yearly mammograms for women over 40 and PSA for males over 40, cardiology follow-up as recommended by initial cardiology pre-transplant evaluation  Renal ultrasound every 2 years  Baseline colonoscopy after age 50 and repeated as recommended    [facility] Patient Status  Functional Status: 80% - Normal activity with effort: some symptoms of disease  Physical Capacity: No Limitations

## 2022-12-27 NOTE — LETTER
Date: 12/27/2022          Pre-Dialysis      To: Dialysis Unit  and Charge RN From: [facility] Social Workers and      Kidney Transplant Nurse Coordinators    RE: Heath PIERCE David, 1960, [id number]     At [facility] [place], we conduct adherence checks as an important part of transplant care. Initial and listed patient assessments are not complete without adherence information.        Please complete the following information:                 Data from the last 3 months:  (data from last 3 months preferred):    Number of AMAs with dates, time, and reasons: ____________________________________________________    ______________________________________________________________________________________________    ______________________________________________________________________________________________    Number of No-Shows with dates and reasons: ______________________________________________________      ______________________________________________________________________________________________      Any concerns with Caregivers:  YES / NO    If yes, please explain:  ___________________________________________________________________________    ______________________________________________________________________________________________     Any concerns with Transportation:  YES / NO    If yes, please explain:  ___________________________________________________________________________    ______________________________________________________________________________________________    Any Psychiatric and/or Psychosocial concerns:  YES / NO     If yes, please explain: ___________________________________________________________________________    ______________________________________________________________________________________________      PLEASE RETURN TO: FAX: [phone]     Thank you for collaborating with us in the care of this patient.           [id number]  ?  [place] [address]  ?  phone [phone]  ?  fax [phone]  ?  [email or web address]  Confidentiality notice: The accompanying facsimile is intended solely for the use of the recipient designated above. Document(s) transmitted herewith may contain information that is confidential and privileged. Delivery, distribution or dissemination of this communication other than to the intended recipient is strictly prohibited. If you have received this facsimile in error, please notify us immediately by telephone.

## 2022-12-27 NOTE — LETTER
Date: 12/27/2022          Listed Patient      To: Dialysis Unit  and Charge RN From: [facility] and      Kidney Transplant Nurse Coordinators    RE: Heath PIERCE David, 1960, [id number]     At [facility], we conduct adherence checks as an important part of transplant care. Initial and listed patient assessments are not complete without adherence information.        Please complete the following information:                 Data from the last 3 months:  (data from last 3 months preferred):    Number of AMAs with dates, time, and reasons: ____________________________________________________    ______________________________________________________________________________________________    ______________________________________________________________________________________________    Number of No-Shows with dates and reasons: ______________________________________________________      ______________________________________________________________________________________________      Any concerns with Caregivers:  YES / NO    If yes, please explain:  ___________________________________________________________________________    ______________________________________________________________________________________________     Any concerns with Transportation:  YES / NO    If yes, please explain:  ___________________________________________________________________________    ______________________________________________________________________________________________    Any Psychiatric and/or Psychosocial concerns:  YES / NO     If yes, please explain: ___________________________________________________________________________    ______________________________________________________________________________________________      PLEASE RETURN TO: FAX: [phone]     Thank you for collaborating with us in the care of this patient.            [address]  ?  [place] [address]  ?  phone [phone]  ?  fax [phone]  ?  [email or web address]  Confidentiality notice: The accompanying facsimile is intended solely for the use of the recipient designated above. Document(s) transmitted herewith may contain information that is confidential and privileged. Delivery, distribution or dissemination of this communication other than to the intended recipient is strictly prohibited. If you have received this facsimile in error, please notify us immediately by telephone.

## 2022-12-27 NOTE — LETTER
December 29, 2022        Jannet Farias  [address] [place] [address]  Phone: [phone]  Fax: [phone]             [facility]- Transplant 1st Fl  [address]  [facility] [address]  Phone: [phone]   Patient: Heath Hernandez   MR Number: [id number]   YOB: 1960   Date of Visit: 12/27/2022       Dear Dr. Jannet Farias    Thank you for referring Heath Hernandez to me for evaluation. Attached you will find relevant portions of my assessment and plan of care.    If you have questions, please do not hesitate to call me. I look forward to following Heath Hernandez along with you.    Sincerely,    Colette Zhou, NP    Enclosure    If you would like to receive this communication electronically, please contact [email or web address] or [phone] to request [employer] access.    [employer] is a tool which provides read-only access to select patient information with whom you have a relationship. Its easy to use and provides real time access to review your patients record including encounter summaries, notes, results, and demographic information.    If you feel you have received this communication in error or would no longer like to receive these types of communications, please e-mail [email or web address]

## 2023-01-03 NOTE — PROGRESS NOTES
Transplant Psychosocial Evaluation Update:  Last psychosocial evaluation for transplant was completed on 10/6/21 by PRERNA Plascencia    Pt presents today in company of wife. Pt and wife  present as alert, oriented to person, place, time, purpose of visit. Pt and wife deny concerns about going through with transplant and state motivation and sense of preparedness for transplantation, caregiver role, psychosocial risk factors, medical risk factors, financial aspects, and lifetime commitments. All concerns and education points as per transplant psychosocial evaluation process addressed (also refer to psychosocial dated 10/6/21). Pt actively participated in today's interview, with wife participating as caregiver. Pt asking questions appropriately and denies changes to previous psychosocial evaluation for transplantation which we reviewed line by line with pt and, per pt choice, with pts caregiver today.    Primary Caregivers and Transportation for Transplant:  1. Junie Hernandez, 57 y/o wife, [phone], drives  2. Darnell Hernandez, 25 y/o son, [phone], drives.  3. Patient's sister, Marilin, drives    Both pt and caregiver/s plan to stay locally at lodging arranged by themselves.  Pt and caregiver informed that lodging assistance will be unavailable beginning 2023.  - information reviewed in depth.  - information reviewed in depth. Caregivers plan to stay at hospital as appropriate for transplant and post-transplant process.    Pts Vocation: Pt works at [employer] Last day worked:  12/26/22.    DME:  PD equipment and supplies.     ADLS:  Pt states ability to independently accomplish all adls.     Household and Dependents: pt resides with wife and son and has no dependents at this time.    Income: Pt states ability to afford all monthly expenses and costs including for medications now and if transplanted based on income and on savings and assets.    Dialysis Adherence: Pt began PD in August of 2022.  Letter to  be sent to dialysis unit for compliance check.     Pt and wife deny any additional concerns, stating preparedness and motivation to proceed with organ transplant.     Suitability for Transplant:   Pt remains low risk for transplant at this time based on psychosocial risk factors and strengths.    Pt gerson well overall with health needs and life in general, denies current or past suicidal/homicidal ideation, has stable supports, adequate insurance, financial stability, transportation and caregiver plans in place, and is using no substances unless prescribed. Recommend fundraising to offset costs associated with transplant.  Pt and caregiver informed that they are responsible for transplant related lodging arrangements and expense.   Pt and caregiver informed that lodging assistance will be unavailable beginning . - information reviewed in depth.      Payer/Plan Subscr  Sex Relation Sub. Ins. ID Effective Group Num   1. BLUE CROSS BL* REBEKA SCHAEFFER 1964 Female Spouse [id number]                                   [address] [address]

## 2023-01-04 ENCOUNTER — TELEPHONE (OUTPATIENT)
Dept: TRANSPLANT | Facility: CLINIC | Age: 63
End: 2023-01-04
Payer: COMMERCIAL

## 2023-01-04 NOTE — TELEPHONE ENCOUNTER
MA notes per faxed adherence check form.     FOR THE PAST THREE MONTHS:    0-AMA's  0-No-shows    No concerns with care giving, transportation, or mental health    Brought over to clinic to be scanned in.    Yesy Chan  Abdominal Transplant MA

## 2023-01-05 ENCOUNTER — LAB VISIT (OUTPATIENT)
Dept: LAB | Facility: HOSPITAL | Age: 63
End: 2023-01-05
Payer: COMMERCIAL

## 2023-01-05 DIAGNOSIS — Z76.82 ORGAN TRANSPLANT CANDIDATE: ICD-10-CM

## 2023-01-10 PROCEDURE — 99001 SPECIMEN HANDLING PT-LAB: CPT | Mod: PO,TXP | Performed by: NURSE PRACTITIONER

## 2023-01-13 ENCOUNTER — LAB VISIT (OUTPATIENT)
Dept: LAB | Facility: HOSPITAL | Age: 63
End: 2023-01-13
Payer: COMMERCIAL

## 2023-01-13 DIAGNOSIS — Z76.82 ORGAN TRANSPLANT CANDIDATE: ICD-10-CM

## 2023-01-18 ENCOUNTER — TELEPHONE (OUTPATIENT)
Dept: TRANSPLANT | Facility: CLINIC | Age: 63
End: 2023-01-18
Payer: COMMERCIAL

## 2023-01-18 LAB — HPRA INTERPRETATION: NORMAL

## 2023-02-07 PROCEDURE — 86833 HLA CLASS II HIGH DEFIN QUAL: CPT | Mod: PO,TXP | Performed by: NURSE PRACTITIONER

## 2023-02-07 PROCEDURE — 86832 HLA CLASS I HIGH DEFIN QUAL: CPT | Mod: PO,TXP | Performed by: NURSE PRACTITIONER

## 2023-02-08 LAB
CLASS I ANTIBODIES - LUMINEX: NEGATIVE
CLASS II ANTIBODIES - LUMINEX: NORMAL
CLASS II ANTIBODY COMMENTS - LUMINEX: NORMAL
CPRA %: 20
SERUM COLLECTION DT - LUMINEX CLASS I: NORMAL
SERUM COLLECTION DT - LUMINEX CLASS II: NORMAL
SPCL1 TESTING DATE: NORMAL
SPCL2 TESTING DATE: NORMAL
SPCLU TESTING DATE: NORMAL

## 2023-02-10 ENCOUNTER — LAB VISIT (OUTPATIENT)
Dept: LAB | Facility: HOSPITAL | Age: 63
End: 2023-02-10
Payer: COMMERCIAL

## 2023-02-10 DIAGNOSIS — Z76.82 ORGAN TRANSPLANT CANDIDATE: ICD-10-CM

## 2023-02-17 LAB — HPRA INTERPRETATION: NORMAL

## 2023-02-23 DIAGNOSIS — Z76.82 ORGAN TRANSPLANT CANDIDATE: Primary | ICD-10-CM

## 2023-03-02 DIAGNOSIS — Z76.82 ORGAN TRANSPLANT CANDIDATE: Primary | ICD-10-CM

## 2023-03-03 ENCOUNTER — LAB VISIT (OUTPATIENT)
Dept: LAB | Facility: HOSPITAL | Age: 63
End: 2023-03-03
Payer: COMMERCIAL

## 2023-03-03 DIAGNOSIS — Z76.82 ORGAN TRANSPLANT CANDIDATE: ICD-10-CM

## 2023-03-03 PROCEDURE — 86826 HLA X-MATCH NONCYTOTOXC ADDL: CPT | Mod: 91,PO,TXP | Performed by: NURSE PRACTITIONER

## 2023-03-03 PROCEDURE — 86825 HLA X-MATH NON-CYTOTOXIC: CPT | Mod: 91,PO,TXP | Performed by: NURSE PRACTITIONER

## 2023-03-03 PROCEDURE — 86825 HLA X-MATH NON-CYTOTOXIC: CPT | Mod: PO,TXP | Performed by: NURSE PRACTITIONER

## 2023-03-03 PROCEDURE — 86826 HLA X-MATCH NONCYTOTOXC ADDL: CPT | Mod: PO,TXP | Performed by: NURSE PRACTITIONER

## 2023-03-07 PROCEDURE — 99001 SPECIMEN HANDLING PT-LAB: CPT | Mod: PO,TXP | Performed by: NURSE PRACTITIONER

## 2023-03-08 ENCOUNTER — LAB VISIT (OUTPATIENT)
Dept: LAB | Facility: HOSPITAL | Age: 63
End: 2023-03-08
Payer: COMMERCIAL

## 2023-03-08 DIAGNOSIS — Z76.82 ORGAN TRANSPLANT CANDIDATE: ICD-10-CM

## 2023-03-08 PROCEDURE — 86825 HLA X-MATH NON-CYTOTOXIC: CPT | Mod: PO,TXP | Performed by: NURSE PRACTITIONER

## 2023-03-08 PROCEDURE — 86826 HLA X-MATCH NONCYTOTOXC ADDL: CPT | Mod: 91,PO,TXP | Performed by: NURSE PRACTITIONER

## 2023-03-08 PROCEDURE — 86826 HLA X-MATCH NONCYTOTOXC ADDL: CPT | Mod: PO,TXP | Performed by: NURSE PRACTITIONER

## 2023-03-08 PROCEDURE — 86825 HLA X-MATH NON-CYTOTOXIC: CPT | Mod: 91,PO,TXP | Performed by: NURSE PRACTITIONER

## 2023-03-10 LAB
B CELL RESULTS - XM ALLO: NEGATIVE
B CELL RESULTS - XM ALLO: NEGATIVE
B MCS AVERAGE - XM ALLO: 13.6
B MCS AVERAGE - XM ALLO: 14.4
DONOR MRN: NORMAL
DONOR MRN: NORMAL
FXMAL TESTING DATE: NORMAL
FXMAL TESTING DATE: NORMAL
HLA FLOW CROSSMATCH (ALLO) INTERPRETATION: NORMAL
SERUM COLLECTION DT - XM ALLO: NORMAL
SERUM COLLECTION DT - XM ALLO: NORMAL
T CELL RESULTS - XM ALLO: NEGATIVE
T CELL RESULTS - XM ALLO: NEGATIVE
T MCS AVERAGE - XM ALLO: 7.6
T MCS AVERAGE - XM ALLO: 8.3

## 2023-03-13 ENCOUNTER — LAB VISIT (OUTPATIENT)
Dept: LAB | Facility: HOSPITAL | Age: 63
End: 2023-03-13
Payer: COMMERCIAL

## 2023-03-13 DIAGNOSIS — Z76.82 ORGAN TRANSPLANT CANDIDATE: ICD-10-CM

## 2023-03-17 LAB
B CELL RESULTS - XM ALLO: NEGATIVE
B CELL RESULTS - XM ALLO: NEGATIVE
B MCS AVERAGE - XM ALLO: 16.4
B MCS AVERAGE - XM ALLO: 21
DONOR MRN: NORMAL
DONOR MRN: NORMAL
FXMAL TESTING DATE: NORMAL
FXMAL TESTING DATE: NORMAL
HLA FLOW CROSSMATCH (ALLO) INTERPRETATION: NORMAL
HLA FREEZE AND HOLD INTERPRETATION: NORMAL
HLAFH COLLECTION DATE: NORMAL
HPRA INTERPRETATION: NORMAL
SERUM COLLECTION DT - XM ALLO: NORMAL
SERUM COLLECTION DT - XM ALLO: NORMAL
T CELL RESULTS - XM ALLO: NEGATIVE
T CELL RESULTS - XM ALLO: NEGATIVE
T MCS AVERAGE - XM ALLO: 10.2
T MCS AVERAGE - XM ALLO: 13.5

## 2023-03-18 LAB
CLASS I ANTIBODIES - LUMINEX: NEGATIVE
CLASS II ANTIBODY COMMENTS - LUMINEX: NORMAL
CPRA %: 0
SERUM COLLECTION DT - LUMINEX CLASS I: NORMAL
SERUM COLLECTION DT - LUMINEX CLASS II: NORMAL
SPCL1 TESTING DATE: NORMAL
SPCL2 TESTING DATE: NORMAL
SPCLU TESTING DATE: NORMAL

## 2023-05-03 PROCEDURE — 86832 HLA CLASS I HIGH DEFIN QUAL: CPT | Mod: PO,TXP | Performed by: NURSE PRACTITIONER

## 2023-05-03 PROCEDURE — 86833 HLA CLASS II HIGH DEFIN QUAL: CPT | Mod: PO,TXP | Performed by: NURSE PRACTITIONER

## 2023-05-09 ENCOUNTER — LAB VISIT (OUTPATIENT)
Dept: LAB | Facility: HOSPITAL | Age: 63
End: 2023-05-09
Payer: COMMERCIAL

## 2023-05-09 DIAGNOSIS — Z76.82 ORGAN TRANSPLANT CANDIDATE: ICD-10-CM

## 2023-05-17 LAB — HPRA INTERPRETATION: NORMAL

## 2023-06-06 ENCOUNTER — TELEPHONE (OUTPATIENT)
Dept: TRANSPLANT | Facility: CLINIC | Age: 63
End: 2023-06-06
Payer: COMMERCIAL

## 2023-06-06 DIAGNOSIS — Z76.82 ORGAN TRANSPLANT CANDIDATE: Primary | ICD-10-CM

## 2023-06-06 NOTE — TELEPHONE ENCOUNTER
Confirmed surgery date for kidney transplant 7/31/23. Will schedule preop testing 7/18/23. Order to obtain testing of PD fluid sent. Patient to arrange with his dialysis unit. All questions were answered and understanding verbalized.

## 2023-06-07 ENCOUNTER — TELEPHONE (OUTPATIENT)
Dept: TRANSPLANT | Facility: CLINIC | Age: 63
End: 2023-06-07
Payer: COMMERCIAL

## 2023-07-17 PROBLEM — Z01.818 PRE-OP EXAMINATION: Chronic | Status: ACTIVE | Noted: 2023-07-17

## 2023-07-17 NOTE — PROGRESS NOTES
Kidney Transplant Recipient Preoperative Evaluation    Subjective:     CC:  Preoperative evaluation of kidney transplant candidacy.    HPI:  Mr. Hernandez is a 62 y.o. year old White male who has been approved to receive a living related kidney transplant.  He has advanced kidney disease secondary to diabetic nephropathy and IgA nephropathy.  Patient is on peritoneal dialysis.  He has presented for his final preoperative medical evaluation. The Pt is currently on PD .Denies peritonitis .  He denies any recent hospitalizations or ED visits.      Looks good, not frail.     Kidney txp scheduled for 23  Last seen by txp on 22      Lab /diagnostic results / txp wk up reviewed      10/6/221 Iliacs: triphasic bilaterally   10/6/21 CXR: No active cardiac or pulmonary findings  10/6/21 PXR No evidence of an unusually advanced degree of iliac arterial vascular calcification in the pelvis  3/17/20 colonoscopy--repeat due 10/2024  10/6/21 renal uS Bosniak 2F complex cyst in the right middle pole measuring up to 2 cm. Recommend further assessment with contrast dedicated root renal ultrasound or contrast MR or CT. Multiple left simple renal cysts.     Urology         ([place]) 21 Rosa Bynum MD:  Assessment & Plan   Note: He returns to discuss the results of his Abdominal MRI 21. It showed small bilateral renal cyst hemorrhagia. It also showed an indeterminate lession 2.1 cm to the right hepatic lobe. He comes in for evaluation of the renal cyst due to needing a kidney transplants for CKD Stage 5   LAST EDITED BY ROSA BYNUM MD     To whom it may concern, Mr. Heath Hernandez,, 1960, has been cleared for surgery      22 TTE  Summary     The left ventricle is mildly enlarged with normal systolic function.  The estimated ejection fraction is 60%.  There is mildly abnormal septal wall motion with small areas of quite mild wall motion abnormality .  Moderate left atrial  "enlargement.  Grade I left ventricular diastolic dysfunction.  Mild right ventricular enlargement.  Moderate right atrial enlargement.  Mild tricuspid regurgitation.  Normal central venous pressure (3 mmHg).  The estimated PA systolic pressure is 41 mmHg.  There is mild pulmonary hypertension.  Small posterior pericardial effusion. Trivial under the RA.     STRESS TEST         (Outside) 11/4/21  ECG Stress:  The ECG chemical " stress portion of the study was normal.  Nuclear Perfusion  The nuclear portion of the study was abnormal.   EF/Regional Thickening;  The ejection fraction and regional thickening on the study was normal.  Overall Conclusion of Test;  Nuclear stress test with no reversible ischemia noted on imaging. Stress portion adequate, and noted        Past Medical History:   Diagnosis Date    Anxiety     Cancer     Skin Cancer    Diabetes mellitus     Diabetes mellitus, type 2     Disorder of kidney and ureter     Hyperlipidemia     Hypertension      Social History     Socioeconomic History    Marital status:      Spouse name: Krystle Hernandez    Number of children: 2   Tobacco Use    Smoking status: Never    Smokeless tobacco: Never   Substance and Sexual Activity    Alcohol use: Yes     Alcohol/week: 15.0 standard drinks     Types: 15 Cans of beer per week     Comment: 15 beers in a week    Drug use: Not Currently    Sexual activity: Not Currently   Social History Narrative    Spoke to patient to complete his history for his upcoming appointment his wife will come with him to his appointment he is aware that he have to bring a small breakfast/snack to eat after blood is drawn he will not need a     History reviewed. No pertinent family history.   Past Surgical History:   Procedure Laterality Date    TONSILLECTOMY        Review of Systems   Constitutional:  Negative for activity change, appetite change, chills, fatigue, fever and unexpected weight change.   HENT:  Negative for " "congestion, facial swelling, postnasal drip, rhinorrhea, sinus pressure, sore throat and trouble swallowing.    Eyes:  Negative for pain, redness and visual disturbance.   Respiratory:  Negative for cough, chest tightness, shortness of breath and wheezing.    Cardiovascular: Negative.  Negative for chest pain, palpitations and leg swelling.   Gastrointestinal:  Negative for abdominal pain, diarrhea, nausea and vomiting.   Genitourinary:  Negative for dysuria, flank pain and urgency.   Musculoskeletal:  Negative for gait problem, neck pain and neck stiffness.   Skin:  Negative for rash.   Allergic/Immunologic: Negative for environmental allergies, food allergies and immunocompromised state.   Neurological:  Negative for dizziness, weakness, light-headedness and headaches.   Psychiatric/Behavioral:  Negative for agitation and confusion. The patient is not nervous/anxious.      Objective:     Blood pressure (!) 144/72, pulse 72, temperature 97.2 °F (36.2 °C), temperature source Tympanic, resp. rate 18, height 5' 9.65" (1.769 m), weight 89.5 kg (197 lb 5 oz), SpO2 99 %.  Physical Exam  Vitals reviewed.   Constitutional:       Appearance: Normal appearance. He is well-developed.   HENT:      Head: Normocephalic.   Eyes:      Pupils: Pupils are equal, round, and reactive to light.   Cardiovascular:      Rate and Rhythm: Normal rate and regular rhythm.      Heart sounds: Normal heart sounds.   Pulmonary:      Effort: Pulmonary effort is normal.      Breath sounds: Normal breath sounds.   Abdominal:      General: Bowel sounds are normal.      Palpations: Abdomen is soft.       Musculoskeletal:         General: Normal range of motion.      Cervical back: Normal range of motion and neck supple.   Skin:     General: Skin is warm and dry.   Neurological:      Mental Status: He is alert and oriented to person, place, and time.      Motor: No abnormal muscle tone.   Psychiatric:         Behavior: Behavior normal. "       Labs:  7/18/2023: Prothrombin Time 10.9 sec (Ref range: 9.0 - 12.5 sec)  Labs were reviewed with the patient.    ABO type: O  Lab Results   Component Value Date    WBC 7.27 07/18/2023    HGB 10.0 (L) 07/18/2023    HCT 28.4 (L) 07/18/2023    MCV 94 07/18/2023     07/18/2023       Lab Results   Component Value Date    CREATININE 12.0 (H) 07/18/2023    BUN 75 (H) 07/18/2023     07/18/2023    K 4.9 07/18/2023     07/18/2023    CO2 22 (L) 07/18/2023     Lab Results   Component Value Date    ALT 81 (H) 07/18/2023    AST 66 (H) 07/18/2023    ALKPHOS 91 07/18/2023    BILITOT 0.4 07/18/2023     PSA, Screen (ng/mL)   Date Value   12/27/2022 0.84       Assessment:     1. CKD (chronic kidney disease) stage 4, GFR 15-29 ml/min    2. Pre-op examination    3. IgA nephropathy    4. Benign hypertension    5. Type 2 diabetes mellitus with diabetic nephropathy, with long-term current use of insulin    6. Class 1 obesity due to excess calories with serious comorbidity and body mass index (BMI) of 30.0 to 30.9 in adult    7. Patient on waiting list for kidney transplant        Plan:    LFTs elevated, SW concerns with alc consumption --discuss with group tomorrow,    Lab Results   Component Value Date    ALT 81 (H) 07/18/2023    AST 66 (H) 07/18/2023    ALKPHOS 91 07/18/2023    BILITOT 0.4 07/18/2023       Transplant Candidacy:   Based on available information, Mr. Hernandez remains a suitable kidney transplant candidate otherwise .  He may proceed with transplant surgery as planned.    Colette Zhou NP       Follow-up:  After the transplant, the patient will follow-up with the kidney transplant team and get blood work per standard protocol as described below.    Clinic: return to transplant clinic weekly for the first month after transplant; every 2 weeks during months 2-3; then at 6-, 9-, 12-, 18-, 24-, and 36- months post-transplant to reassess for complications from immunosuppression and monitor for  rejection.  Annually thereafter.     Labs: continue twice weekly CBC, renal panel, and drug level for first month; then same labs once weekly through 3rd month post-transplant.  Urine for UA, protein/creatinine ratio monthly.  Add urine BK - PCR at 1-, 3-, 6-, 9-, 12-, 18-, 24-, and 36- months post-transplant.  Hepatic panel at 1-, 2-, 3-, 6-, 9-, 12-, 18-, 24-, and 36- months post-transplant.    Counseling:     Patient advised that it is recommended that all transplant candidates, and their close contacts and household members receive Covid vaccination.

## 2023-07-18 ENCOUNTER — HOSPITAL ENCOUNTER (OUTPATIENT)
Dept: PREADMISSION TESTING | Facility: HOSPITAL | Age: 63
Discharge: HOME | End: 2023-07-18
Attending: TRANSPLANT SURGERY | Admitting: TRANSPLANT SURGERY
Payer: COMMERCIAL

## 2023-07-18 ENCOUNTER — SOCIAL WORK (OUTPATIENT)
Dept: TRANSPLANT | Facility: CLINIC | Age: 63
End: 2023-07-18
Payer: COMMERCIAL

## 2023-07-18 ENCOUNTER — ANESTHESIA EVENT (OUTPATIENT)
Dept: SURGERY | Facility: HOSPITAL | Age: 63
DRG: 652 | End: 2023-07-18
Payer: COMMERCIAL

## 2023-07-18 ENCOUNTER — HOSPITAL ENCOUNTER (OUTPATIENT)
Dept: CARDIOLOGY | Facility: CLINIC | Age: 63
Discharge: HOME | End: 2023-07-18
Payer: COMMERCIAL

## 2023-07-18 ENCOUNTER — CLINICAL SUPPORT (OUTPATIENT)
Dept: TRANSPLANT | Facility: CLINIC | Age: 63
End: 2023-07-18
Payer: COMMERCIAL

## 2023-07-18 ENCOUNTER — HOSPITAL ENCOUNTER (OUTPATIENT)
Dept: RADIOLOGY | Facility: HOSPITAL | Age: 63
Discharge: HOME | End: 2023-07-18
Attending: NURSE PRACTITIONER
Payer: COMMERCIAL

## 2023-07-18 ENCOUNTER — OFFICE VISIT (OUTPATIENT)
Dept: TRANSPLANT | Facility: CLINIC | Age: 63
End: 2023-07-18
Payer: COMMERCIAL

## 2023-07-18 VITALS
TEMPERATURE: 97 F | WEIGHT: 197.31 LBS | DIASTOLIC BLOOD PRESSURE: 72 MMHG | HEART RATE: 72 BPM | TEMPERATURE: 97 F | TEMPERATURE: 97 F | SYSTOLIC BLOOD PRESSURE: 144 MMHG | HEIGHT: 70 IN | OXYGEN SATURATION: 99 % | SYSTOLIC BLOOD PRESSURE: 144 MMHG | BODY MASS INDEX: 28.25 KG/M2 | OXYGEN SATURATION: 99 % | OXYGEN SATURATION: 99 % | WEIGHT: 197.31 LBS | DIASTOLIC BLOOD PRESSURE: 72 MMHG | HEIGHT: 70 IN | HEART RATE: 72 BPM | BODY MASS INDEX: 28.25 KG/M2 | BODY MASS INDEX: 28.25 KG/M2 | RESPIRATION RATE: 18 BRPM | HEIGHT: 70 IN | OXYGEN SATURATION: 99 % | HEART RATE: 72 BPM | DIASTOLIC BLOOD PRESSURE: 72 MMHG | HEIGHT: 70 IN | SYSTOLIC BLOOD PRESSURE: 144 MMHG | WEIGHT: 197.31 LBS | WEIGHT: 197.31 LBS | RESPIRATION RATE: 18 BRPM | SYSTOLIC BLOOD PRESSURE: 144 MMHG | RESPIRATION RATE: 18 BRPM | TEMPERATURE: 97 F | RESPIRATION RATE: 18 BRPM | BODY MASS INDEX: 28.25 KG/M2 | DIASTOLIC BLOOD PRESSURE: 72 MMHG | HEART RATE: 72 BPM

## 2023-07-18 VITALS
HEART RATE: 81 BPM | HEIGHT: 70 IN | RESPIRATION RATE: 16 BRPM | DIASTOLIC BLOOD PRESSURE: 72 MMHG | TEMPERATURE: 98 F | OXYGEN SATURATION: 98 % | BODY MASS INDEX: 28.44 KG/M2 | WEIGHT: 198.63 LBS | SYSTOLIC BLOOD PRESSURE: 140 MMHG

## 2023-07-18 DIAGNOSIS — E66.09 CLASS 1 OBESITY DUE TO EXCESS CALORIES WITH SERIOUS COMORBIDITY AND BODY MASS INDEX (BMI) OF 30.0 TO 30.9 IN ADULT: ICD-10-CM

## 2023-07-18 DIAGNOSIS — N02.B9 IGA NEPHROPATHY: ICD-10-CM

## 2023-07-18 DIAGNOSIS — Z76.82 ORGAN TRANSPLANT CANDIDATE: ICD-10-CM

## 2023-07-18 DIAGNOSIS — Z01.818 PRE-OP EXAMINATION: Chronic | ICD-10-CM

## 2023-07-18 DIAGNOSIS — N18.4 CKD (CHRONIC KIDNEY DISEASE) STAGE 4, GFR 15-29 ML/MIN: Primary | ICD-10-CM

## 2023-07-18 DIAGNOSIS — Z79.4 TYPE 2 DIABETES MELLITUS WITH DIABETIC NEPHROPATHY, WITH LONG-TERM CURRENT USE OF INSULIN: ICD-10-CM

## 2023-07-18 DIAGNOSIS — I10 BENIGN HYPERTENSION: ICD-10-CM

## 2023-07-18 DIAGNOSIS — Z76.82 PATIENT ON WAITING LIST FOR KIDNEY TRANSPLANT: ICD-10-CM

## 2023-07-18 DIAGNOSIS — N18.6 ESRD (END STAGE RENAL DISEASE) ON DIALYSIS: ICD-10-CM

## 2023-07-18 DIAGNOSIS — E11.21 TYPE 2 DIABETES MELLITUS WITH DIABETIC NEPHROPATHY, WITH LONG-TERM CURRENT USE OF INSULIN: ICD-10-CM

## 2023-07-18 DIAGNOSIS — Z99.2 ESRD (END STAGE RENAL DISEASE) ON DIALYSIS: ICD-10-CM

## 2023-07-18 PROCEDURE — 71046 X-RAY EXAM CHEST 2 VIEWS: CPT | Mod: TC,FY,TXP

## 2023-07-18 PROCEDURE — 3077F PR MOST RECENT SYSTOLIC BLOOD PRESSURE >= 140 MM HG: ICD-10-PCS | Mod: CPTII,S$GLB,TXP, | Performed by: TRANSPLANT SURGERY

## 2023-07-18 PROCEDURE — 3077F SYST BP >= 140 MM HG: CPT | Mod: CPTII,S$GLB,TXP, | Performed by: TRANSPLANT SURGERY

## 2023-07-18 PROCEDURE — 3008F BODY MASS INDEX DOCD: CPT | Mod: CPTII,S$GLB,TXP, | Performed by: TRANSPLANT SURGERY

## 2023-07-18 PROCEDURE — 99999 PR PBB SHADOW E&M-EST. PATIENT-LVL III: ICD-10-PCS | Mod: PBBFAC,TXP,,

## 2023-07-18 PROCEDURE — 99999 PR PBB SHADOW E&M-EST. PATIENT-LVL III: CPT | Mod: PBBFAC,TXP,,

## 2023-07-18 PROCEDURE — 3078F PR MOST RECENT DIASTOLIC BLOOD PRESSURE < 80 MM HG: ICD-10-PCS | Mod: CPTII,S$GLB,TXP, | Performed by: NURSE PRACTITIONER

## 2023-07-18 PROCEDURE — 99999 PR PBB SHADOW E&M-EST. PATIENT-LVL IV: CPT | Mod: PBBFAC,TXP,, | Performed by: NURSE PRACTITIONER

## 2023-07-18 PROCEDURE — 3008F PR BODY MASS INDEX (BMI) DOCUMENTED: ICD-10-PCS | Mod: CPTII,S$GLB,TXP, | Performed by: TRANSPLANT SURGERY

## 2023-07-18 PROCEDURE — 1160F PR REVIEW ALL MEDS BY PRESCRIBER/CLIN PHARMACIST DOCUMENTED: ICD-10-PCS | Mod: CPTII,S$GLB,TXP, | Performed by: NURSE PRACTITIONER

## 2023-07-18 PROCEDURE — 99999 PR PBB SHADOW E&M-EST. PATIENT-LVL I: ICD-10-PCS | Mod: PBBFAC,TXP,,

## 2023-07-18 PROCEDURE — 93010 ELECTROCARDIOGRAM REPORT: CPT | Mod: S$GLB,TXP,, | Performed by: INTERNAL MEDICINE

## 2023-07-18 PROCEDURE — 4010F PR ACE/ARB THEARPY RXD/TAKEN: ICD-10-PCS | Mod: CPTII,S$GLB,TXP, | Performed by: TRANSPLANT SURGERY

## 2023-07-18 PROCEDURE — 71046 X-RAY EXAM CHEST 2 VIEWS: CPT | Mod: 26,TXP,, | Performed by: RADIOLOGY

## 2023-07-18 PROCEDURE — 71046 XR CHEST PA AND LATERAL: ICD-10-PCS | Mod: 26,TXP,, | Performed by: RADIOLOGY

## 2023-07-18 PROCEDURE — 93005 ELECTROCARDIOGRAM TRACING: CPT | Mod: S$GLB,TXP,, | Performed by: NURSE PRACTITIONER

## 2023-07-18 PROCEDURE — 99999 PR PBB SHADOW E&M-EST. PATIENT-LVL I: CPT | Mod: PBBFAC,TXP,,

## 2023-07-18 PROCEDURE — 1159F PR MEDICATION LIST DOCUMENTED IN MEDICAL RECORD: ICD-10-PCS | Mod: CPTII,S$GLB,TXP, | Performed by: NURSE PRACTITIONER

## 2023-07-18 PROCEDURE — 3077F SYST BP >= 140 MM HG: CPT | Mod: CPTII,S$GLB,TXP, | Performed by: NURSE PRACTITIONER

## 2023-07-18 PROCEDURE — 3077F PR MOST RECENT SYSTOLIC BLOOD PRESSURE >= 140 MM HG: ICD-10-PCS | Mod: CPTII,S$GLB,TXP, | Performed by: NURSE PRACTITIONER

## 2023-07-18 PROCEDURE — 1160F RVW MEDS BY RX/DR IN RCRD: CPT | Mod: CPTII,S$GLB,TXP, | Performed by: NURSE PRACTITIONER

## 2023-07-18 PROCEDURE — 99215 PR OFFICE/OUTPT VISIT, EST, LEVL V, 40-54 MIN: ICD-10-PCS | Mod: S$GLB,TXP,, | Performed by: TRANSPLANT SURGERY

## 2023-07-18 PROCEDURE — 1159F MED LIST DOCD IN RCRD: CPT | Mod: CPTII,S$GLB,TXP, | Performed by: NURSE PRACTITIONER

## 2023-07-18 PROCEDURE — 99215 PR OFFICE/OUTPT VISIT, EST, LEVL V, 40-54 MIN: ICD-10-PCS | Mod: S$GLB,TXP,, | Performed by: NURSE PRACTITIONER

## 2023-07-18 PROCEDURE — 3078F DIAST BP <80 MM HG: CPT | Mod: CPTII,S$GLB,TXP, | Performed by: TRANSPLANT SURGERY

## 2023-07-18 PROCEDURE — 93005 EKG 12-LEAD: ICD-10-PCS | Mod: S$GLB,TXP,, | Performed by: NURSE PRACTITIONER

## 2023-07-18 PROCEDURE — 99215 OFFICE O/P EST HI 40 MIN: CPT | Mod: S$GLB,TXP,, | Performed by: TRANSPLANT SURGERY

## 2023-07-18 PROCEDURE — 4010F PR ACE/ARB THEARPY RXD/TAKEN: ICD-10-PCS | Mod: CPTII,S$GLB,TXP, | Performed by: NURSE PRACTITIONER

## 2023-07-18 PROCEDURE — 4010F ACE/ARB THERAPY RXD/TAKEN: CPT | Mod: CPTII,S$GLB,TXP, | Performed by: TRANSPLANT SURGERY

## 2023-07-18 PROCEDURE — 93010 EKG 12-LEAD: ICD-10-PCS | Mod: S$GLB,TXP,, | Performed by: INTERNAL MEDICINE

## 2023-07-18 PROCEDURE — 3008F BODY MASS INDEX DOCD: CPT | Mod: CPTII,S$GLB,TXP, | Performed by: NURSE PRACTITIONER

## 2023-07-18 PROCEDURE — 3008F PR BODY MASS INDEX (BMI) DOCUMENTED: ICD-10-PCS | Mod: CPTII,S$GLB,TXP, | Performed by: NURSE PRACTITIONER

## 2023-07-18 PROCEDURE — 99999 PR PBB SHADOW E&M-EST. PATIENT-LVL IV: ICD-10-PCS | Mod: PBBFAC,TXP,, | Performed by: NURSE PRACTITIONER

## 2023-07-18 PROCEDURE — 99999 PR PBB SHADOW E&M-EST. PATIENT-LVL IV: ICD-10-PCS | Mod: PBBFAC,TXP,,

## 2023-07-18 PROCEDURE — 3078F DIAST BP <80 MM HG: CPT | Mod: CPTII,S$GLB,TXP, | Performed by: NURSE PRACTITIONER

## 2023-07-18 PROCEDURE — 4010F ACE/ARB THERAPY RXD/TAKEN: CPT | Mod: CPTII,S$GLB,TXP, | Performed by: NURSE PRACTITIONER

## 2023-07-18 PROCEDURE — 3078F PR MOST RECENT DIASTOLIC BLOOD PRESSURE < 80 MM HG: ICD-10-PCS | Mod: CPTII,S$GLB,TXP, | Performed by: TRANSPLANT SURGERY

## 2023-07-18 PROCEDURE — 99215 OFFICE O/P EST HI 40 MIN: CPT | Mod: S$GLB,TXP,, | Performed by: NURSE PRACTITIONER

## 2023-07-18 PROCEDURE — 99999 PR PBB SHADOW E&M-EST. PATIENT-LVL IV: CPT | Mod: PBBFAC,TXP,,

## 2023-07-18 RX ORDER — SODIUM CHLORIDE 0.9 % (FLUSH) 0.9 %
10 SYRINGE (ML) INJECTION
Status: CANCELLED | OUTPATIENT
Start: 2023-07-18

## 2023-07-18 RX ORDER — PREGABALIN 75 MG/1
75 CAPSULE ORAL
Status: CANCELLED | OUTPATIENT
Start: 2023-07-18

## 2023-07-18 RX ORDER — MUPIROCIN 20 MG/G
OINTMENT TOPICAL
Status: CANCELLED | OUTPATIENT
Start: 2023-07-18

## 2023-07-18 RX ORDER — ATORVASTATIN CALCIUM 10 MG/1
10 TABLET, FILM COATED ORAL DAILY
Status: ON HOLD | COMMUNITY
Start: 2023-05-12 | End: 2023-10-17 | Stop reason: SDUPTHER

## 2023-07-18 RX ORDER — ACETAMINOPHEN 650 MG/20.3ML
650 LIQUID ORAL ONCE
Status: CANCELLED | OUTPATIENT
Start: 2023-07-18 | End: 2023-07-18

## 2023-07-18 RX ORDER — GENTAMICIN SULFATE 1 MG/G
CREAM TOPICAL
Status: ON HOLD | COMMUNITY
Start: 2023-04-13 | End: 2023-10-17 | Stop reason: HOSPADM

## 2023-07-18 RX ORDER — HEPARIN SODIUM 5000 [USP'U]/ML
5000 INJECTION, SOLUTION INTRAVENOUS; SUBCUTANEOUS ONCE
Status: CANCELLED | OUTPATIENT
Start: 2023-07-18 | End: 2023-07-18

## 2023-07-18 RX ORDER — CEFAZOLIN SODIUM 2 G/50ML
2 SOLUTION INTRAVENOUS
Status: CANCELLED | OUTPATIENT
Start: 2023-07-18

## 2023-07-18 RX ORDER — DIPHENHYDRAMINE HYDROCHLORIDE 50 MG/ML
50 INJECTION INTRAMUSCULAR; INTRAVENOUS ONCE
Status: CANCELLED | OUTPATIENT
Start: 2023-07-18 | End: 2023-07-18

## 2023-07-18 NOTE — PROGRESS NOTES
Met with Heath Hernandez in the clinic as part of her pre-transplant clearance appointment.    1) Performed a complete medication reconciliation.    2) Obtained copies of insurance cards, patient understood that the Pharm.D. will order medications, and that medications must be available prior to discharge   3) Discussed medication education that will occur post-transplant   4) Patient will use [facility] for first fill.  5)  was checked: opioid naive - however takes klonopin    Current Outpatient Medications   Medication Sig Dispense Refill    amLODIPine (NORVASC) 5 MG tablet Take 10 mg by mouth once daily.      atorvastatin (LIPITOR) 10 MG tablet Take 10 mg by mouth once daily.      AURYXIA 210 mg iron Tab Take 2 tablets by mouth once daily.      carvediloL (COREG) 25 MG tablet Take 25 mg by mouth 2 (two) times daily.      clonazePAM (KLONOPIN) 1 MG tablet Take 1 mg by mouth nightly as needed.      furosemide (LASIX) 40 MG tablet Take 40 mg by mouth once daily.      gentamicin (GARAMYCIN) 0.1 % cream APPLY TO AFFECTED AREA EVERY DAY AND AS NEEDED      losartan (COZAAR) 100 MG tablet Take 100 mg by mouth once daily.      CRISTIAN-ESEQUIEL 0.8 mg Tab Take 1 tablet by mouth once daily.      sevelamer carbonate (RENVELA) 800 mg Tab Take 2,400 mg by mouth 2 (two) times a day.      sodium bicarbonate 650 MG tablet Take 1 tablet by mouth 3 (three) times daily.      TRESIBA U-100 INSULIN 100 unit/mL injection Inject 10 Units into the skin once daily.       No current facility-administered medications for this visit.       Patient verbalized understanding and had the opportunity to ask questions

## 2023-07-18 NOTE — ANESTHESIA PREPROCEDURE EVALUATION
[facility]  Anesthesia Pre-Operative Evaluation         Patient Name/: Heath Hernandez, 1960  MRN: [id number]    SUBJECTIVE:     Pre-operative evaluation for Procedure(s) (LRB):  TRANSPLANT, KIDNEY (N/A)  REMOVAL, CATHETER, DIALYSIS, PERITONEAL (N/A)     2023    Heath Hernandez is a 62 y.o. male w/ a significant PMHx of DM2, HLD, HTN, HFpEF, Skin cancer, kidney disease d/t IgA neprhopathy and diabetic nephropathy now presents for the above procedure(s). Pt is receiving kidney from his niece who is a living donor. No history of significant surgeries. No cervical or spinal surgery. Most recent ECHO 3 months ago. Pt states that these results were delivered to [facility]. Pt also indicated that he had significant constipation after anesthesia in the past. No PONV or other complications.    ________________________________________  Results for orders placed during the hospital encounter of 22    Echo Saline Bubble? No    Interpretation Summary  · The left ventricle is mildly enlarged with normal systolic function.  · The estimated ejection fraction is 60%.  · There is mildly abnormal septal wall motion with small areas of quite mild wall motion abnormality .  · Moderate left atrial enlargement.  · Grade I left ventricular diastolic dysfunction.  · Mild right ventricular enlargement.  · Moderate right atrial enlargement.  · Mild tricuspid regurgitation.  · Normal central venous pressure (3 mmHg).  · The estimated PA systolic pressure is 41 mmHg.  · There is mild pulmonary hypertension.  · Small posterior pericardial effusion. Trivial under the RA.    ________________________________________    Prev airway: None documented.    LDA: None documented.       Drips: None documented.      Patient Active Problem List   Diagnosis    Type 2 diabetes mellitus with renal complication    Benign hypertension    IgA nephropathy    Dyslipidemia    CKD (chronic kidney disease) stage 4, GFR  15-29 ml/min    Patient on waiting list for kidney transplant    Class 1 obesity due to excess calories with serious comorbidity and body mass index (BMI) of 30.0 to 30.9 in adult    Pre-op examination       Review of patient's allergies indicates:  No Known Allergies    Current Inpatient Medications:       Current Outpatient Medications on File Prior to Encounter   Medication Sig Dispense Refill    amLODIPine (NORVASC) 5 MG tablet Take 10 mg by mouth once daily.      atorvastatin (LIPITOR) 10 MG tablet Take 10 mg by mouth once daily.      AURYXIA 210 mg iron Tab Take 2 tablets by mouth once daily.      carvediloL (COREG) 25 MG tablet Take 25 mg by mouth 2 (two) times daily.      clonazePAM (KLONOPIN) 1 MG tablet Take 1 mg by mouth nightly as needed.      furosemide (LASIX) 40 MG tablet Take 40 mg by mouth once daily.      gentamicin (GARAMYCIN) 0.1 % cream APPLY TO AFFECTED AREA EVERY DAY AND AS NEEDED      losartan (COZAAR) 100 MG tablet Take 100 mg by mouth once daily.      CRISTIAN-ESEQUIEL 0.8 mg Tab Take 1 tablet by mouth once daily.      sevelamer carbonate (RENVELA) 800 mg Tab Take 2,400 mg by mouth 2 (two) times a day.      sodium bicarbonate 650 MG tablet Take 1 tablet by mouth 3 (three) times daily.      TRESIBA U-100 INSULIN 100 unit/mL injection Inject 10 Units into the skin once daily.       No current facility-administered medications on file prior to encounter.       Past Surgical History:   Procedure Laterality Date    TONSILLECTOMY         Social History:  Tobacco Use: Low Risk     Smoking Tobacco Use: Never    Smokeless Tobacco Use: Never    Passive Exposure: Not on file       Alcohol Use: Not on file       OBJECTIVE:     Vital Signs Range:  BMI Readings from Last 1 Encounters:   07/18/23 28.79 kg/m²       Temp:  [36.2 °C (97.2 °F)-36.7 °C (98.1 °F)]   Pulse:  [72-81]   Resp:  [16-18]   BP: (140-144)/(72)   SpO2:  [98 %-99 %]        Significant Labs:        Component Value Date/Time     WBC 7.27 07/18/2023 0724    HGB 10.0 (L) 07/18/2023 0724    HCT 28.4 (L) 07/18/2023 0724     07/18/2023 0724     07/18/2023 0724    K 4.9 07/18/2023 0724     07/18/2023 0724    CO2 22 (L) 07/18/2023 0724    GLU 92 07/18/2023 0724    BUN 75 (H) 07/18/2023 0724    CREATININE 12.0 (H) 07/18/2023 0724    PHOS 7.0 (H) 07/18/2023 0724    CALCIUM 8.4 (L) 07/18/2023 0724    ALBUMIN 2.9 (L) 07/18/2023 0724    PROT 6.2 07/18/2023 0724    ALKPHOS 91 07/18/2023 0724    BILITOT 0.4 07/18/2023 0724    AST 66 (H) 07/18/2023 0724    ALT 81 (H) 07/18/2023 0724    INR 1.0 07/18/2023 0724    HGBA1C 6.6 (H) 10/06/2021 0704        Please see Results Review for additional labs.     Diagnostic Studies: No relevant studies.    EKG:   No results found for this or any previous visit.    ECHO:  See subjective, if available.      ASSESSMENT/PLAN:           Pre-op Assessment    I have reviewed the Patient Summary Reports.     I have reviewed the Nursing Notes. I have reviewed the NPO Status.   I have reviewed the Medications.     Review of Systems  Anesthesia Hx:  No problems with previous Anesthesia  History of prior surgery of interest to airway management or planning: Previous anesthesia: General Denies Family Hx of Anesthesia complications.   Denies Personal Hx of Anesthesia complications.   Cardiovascular:   Hypertension    Renal/:   Chronic Renal Disease    Endocrine:   Diabetes, well controlled, type 2        Physical Exam  General: Well nourished, Cooperative, Alert and Oriented    Airway:  Mallampati: II / I  Mouth Opening: Normal  TM Distance: Normal  Tongue: Normal  Neck ROM: Normal ROM    Dental:  Intact    Chest/Lungs:  Clear to auscultation, Normal Respiratory Rate    Heart:  Rate: Normal  Rhythm: Regular Rhythm  Sounds: Normal        Anesthesia Plan  Type of Anesthesia, risks & benefits discussed:    Anesthesia Type: Gen ETT  Intra-op Monitoring Plan: Standard ASA Monitors  Post Op Pain Control Plan:  multimodal analgesia and IV/PO Opioids PRN  Induction:  IV  Airway Plan: Direct and Video, Post-Induction  Informed Consent: Informed consent signed with the Patient and all parties understand the risks and agree with anesthesia plan.  All questions answered.   ASA Score: 3  Day of Surgery Review of History & Physical: H&P Update referred to the surgeon/provider.    Ready For Surgery From Anesthesia Perspective.     .

## 2023-07-18 NOTE — LETTER
July 19, 2023        Jannet Farias  [address] [place] [address]  Phone: [phone]  Fax: [phone]             [facility] 1st Fl  [address]  [facility] [address]  Phone: [phone]   Patient: Heath Hernandez   MR Number: [id number]   YOB: 1960   Date of Visit: 7/18/2023       Dear Dr. Jannet Farias    Thank you for referring Heath Hernandez to me for evaluation. Attached you will find relevant portions of my assessment and plan of care.    If you have questions, please do not hesitate to call me. I look forward to following Heath Hernandez along with you.    Sincerely,    Colette Zhou, NP    Enclosure    If you would like to receive this communication electronically, please contact [email or web address] or [phone] to request [employer] access.    [employer] is a tool which provides read-only access to select patient information with whom you have a relationship. Its easy to use and provides real time access to review your patients record including encounter summaries, notes, results, and demographic information.    If you feel you have received this communication in error or would no longer like to receive these types of communications, please e-mail [email or web address]

## 2023-07-18 NOTE — PROGRESS NOTES
Transplant Surgery  Kidney Transplant Recipient Evaluation    Referring Physician: Jannet Farias  Current Nephrologist: Jannet Farias    Subjective:     Reason for Visit: evaluate transplant candidacy    History of Present Illness: Heath Hernandez is a 62 y.o. year old male undergoing transplant evaluation.    Dialysis History: Heath is on peritoneal dialysis.      Transplant History: N/A    Etiology of Renal Disease: Diabetes Mellitus - Type II (based on medical records from referral).    External provider notes reviewed: Yes    Review of Systems   Constitutional:  Negative for activity change and appetite change.   HENT:  Negative for congestion and tinnitus.    Eyes:  Negative for redness and visual disturbance.   Respiratory:  Negative for cough and shortness of breath.    Cardiovascular:  Negative for chest pain and palpitations.   Gastrointestinal:  Negative for abdominal distention and abdominal pain.   Endocrine: Negative for polydipsia and polyuria.   Genitourinary:  Negative for decreased urine volume and dysuria.   Musculoskeletal:  Negative for arthralgias and back pain.   Skin:  Negative for pallor and rash.   Allergic/Immunologic: Negative for environmental allergies and immunocompromised state.   Neurological:  Negative for tremors and headaches.   Hematological:  Negative for adenopathy. Does not bruise/bleed easily.   Psychiatric/Behavioral:  Negative for behavioral problems and confusion.    Objective:     Physical Exam:  Constitutional:   Vitals reviewed: yes   Well-nourished and well-groomed: yes  Eyes:   Sclerae icteric: no   Extraocular movements intact: yes  GI:    Bowel sounds normal: yes   Tenderness: no    If yes, quadrant/location: not applicable   Palpable masses: no    If yes, quadrant/location: not applicable   Hepatosplenomegaly: no   Ascites: no   Hernia: no    If yes, type/location: not applicable   Surgical scars: no    If yes, type/location: not applicable  Resp:   Effort  normal: yes   Breath sounds normal: yes    CV:   Regular rate and rhythm: yes   Heart sounds normal: yes   Femoral pulses normal: yes   Extremities edematous: no  Skin:   Rashes or lesions present: no    If yes, describe:not applicable   Jaundice:: no    Musculoskeletal:   Gait normal: yes   Strength normal: yes  Psych:   Oriented to person, place, and time: yes   Affect and mood normal: yes    Additional comments: not applicable    Diagnostics:  The following labs have been reviewed: CBC  CMP  The following radiology images have been independently reviewed and interpreted:     Counseling: We provided Heath Hernandez with a group education session today.  We discussed kidney transplantation at length with him, including risks, potential complications, and alternatives in the management of his renal failure.  The discussion included complications related to anesthesia, bleeding, infection, primary nonfunction, and ATN.  I discussed the typical postoperative course, length of hospitalization, the need for long-term immunosuppression, and the need for long-term routine follow-up.  I discussed living-donor and -donor transplantation and the relative advantages and disadvantages of each.  I also discussed average waiting times for both living donation and  donation.  I discussed national and center-specific survival rates.  I also mentioned the potential benefit of multicenter listing to candidates listed with centers within more than one organ procurement organization.  All questions were answered.    Patient advised that it is recommended that all transplant candidates, and their close contacts and household members receive Covid vaccination.    Final determination of transplant candidacy will be made once evaluation is complete and reviewed by the Kidney & Kidney/Pancreas Selection Committee.    Coronavirus disease (COVID-19) caused by severe acute respiratory virus coronavirus 2 (SARS-C0V 2) is  associated with increased mortality in solid organ transplant recipients (SOT) compared to non-transplant patients. Vaccine responses to vaccination are depressed against SARS-CoV2 compared to normal individuals but improve with third vaccination doses. Vaccination prior to SOT provides both the best opportunity for transplant candidates to develop protective immunity and to reduce the risk of serious COVID19 infections post transplantation. Organ transplant candidates at [facility] will be required to receive SARS-CoV-2 vaccination prior to being listed with a an active status, whenever possible. Exceptions will be made for disability related reasons or for sincerely held Holiness beliefs.          Transplant Surgery - Candidacy   Assessment/Plan:   Heath Hernandez has end stage renal disease (ESRD) on dialysis. I see no surgical contraindication to placing a kidney transplant. Based on available information, Heath Hernandez is a suitable kidney transplant candidate. I also discussed removal of the PD catheter at the time of the transplant.    Additional testing to be completed according to the Written Order Guidelines for Adult Pre-kidney and Pancreas Transplant Evaluation (KI-02).  Interpretation of tests and discussion of patient management involves all members of the multidisciplinary transplant team.    Cuauhtemoc Morales MD

## 2023-07-18 NOTE — PROGRESS NOTES
RECIPIENT PRE-OP NOTE    SW completed pre-op assessment with patient and pt's wife Junie Hernandez in clinic.    Potential Recipient Name: Heath Hernandez, [id number]  Encounter Date: 7/18/2023    Sex: male  YOB: 1960  Age: 62 y.o.    Housing/Contact Info:  [address] [address]  Telephone Information:   Mobile [phone]    Home: [phone] (home)  Work: [phone] (work)  E-mail: [email or web address]    Potential Surgery Date: 7/31/2023  Potential Donor Name: Arlene Mcclelland, Clinic Number: [id number]  Potential Donor Relationship:  niece    Patient presents as alert and oriented x 4, pleasant, well groomed, recall good, concentration/judgement good, average intelligence, communicative, cooperative, and asking and answering questions appropriately. SW did observe pt to have slight tremor in hands and frequent shifting in seat during assessment.  Patient presents as a 62 y.o. year old male to recipient pre-op appointment for scheduled kidney living donor surgery. Patient's wife accompanies patient.  Patient states that he is independent with ADLs at this time.  Patient states motivation to pursue organ transplant at this time.    Does patient drive? yes  Patient is aware will not be able to drive until medically cleared by the transplant team. Patient verbalizes understanding that patient will need assistance for all transportation needs until medically cleared to drive.    Caregivers/Transportation:  Name: Junie Hernandez  Age: 58  Phone: [phone]  Relationship: wife  Does person drive? yes  Does person have own/reliable transportation? yes    Name: Darnell Hernandez  Age: 24  Phone: [phone]  Relationship: son  Does person drive? yes  Does person have own/reliable transportation? yes    Dependents/Others who rely on Patient/Caregiver for care: none    Cognitive:  Education: high school  Reading Level: college  Reports difficulty with: N/A  Denies difficulty with:  reading, writing, seeing, hearing, comprehension, learning, and memory    Infusion Service: patient utilizing? no  Home Health: patient utilizing? no  DME:  patient utilizing? yes - glucometer    Living Will: no  Healthcare Power of : no  Written LW/HCPA and verbal information presented to patient today.    Insurance: Payor: [employer] / Plan: BCBS ALL OUT OF STATE / Product Type: PPO /   Possible concerns regarding insurance post-donation reviewed. Patient verbalizes clear understanding.    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  Able to take time off work without financial concerns: yes.. Patient does plan to return to work once medically cleared. Patient referred to vocational rehabilitation.  Spouse/Significant Other Employment: occupation: , company: unknown, time at employer: 15 years.  Patient states does not expect to have any financial problems following transplant surgery.  Patient states has not conducted fundraising to assist with post-transplant costs.    Tobacco/Alcohol/Illicit Drug Abuse: Patient reports does not use tobacco products, illicit drugs, and non-prescription medications and that patient does plan to remain abstinent. However, pt reports continuing ETOH use which pt reports as typically 3 or 4 beers (Natural Light) on most days of the week.  Pt reports last ETOH use was yesterday evening when pt had 3 beers before 6 pm.  Pt reports he was instructed to abstain from ETOH by 6 pm in the evening before pre-op appointment and remaining abstinent for the purpose of undergoing transplant surgery.  Due to SW's observance of pt's physical presentation (slight tremor in hands and shifting in seat), SW inquired about any history or current experiences of withdrawal when abstaining from ETOH use.  Pt denies ever having experienced withdrawal symptoms.   "Pt's wife denies ever having any concerns regarding pt's ETOH use stating she does not believe him to be an alcoholic and considers pt's choice of ETOH beverage (Natural Light beer) to be "like drinking water".  JOEL provided these updates to kidney transplant WILLEM Colette Zhou NP who relayed update to transplant surgeon Dr. Morales.  MARLON ordered for today's labs.    Psychiatric History: patient denies current mental health difficulties including anxiety and/or depression.  Pt has history of anxiety symptoms related to life stressors including death of oldest son via opioid overdose at age of 24.  Pt reports continuing to take Klonopin nightly which, in contrast to what pt has previously reported to SW, pt states today is prescribed for sleep.    Coping: Patient states that he is coping well with having kidney living donor surgery. Patient states will call as needed and does understand how to access resources including the  as needed, both inpatient and outpatient.    Resources, information and support provided. Psychosocial aspects regarding organ donation and transplantation were discussed. Patient reports having a clear understanding of resources, information, support and psychosocial aspects.    Discharge Plan: Patient to discharge to the [facility] under the care of patient's wife post-organ transplant. Patient states that patient's wife will be present as caregiver in the hospital. Patient's wife will transport patient home. Patient states agreement with not driving and not returning to work until medically cleared to do so.    Patient states having clear understanding and realistic expectations regarding the potential risks and potential benefits of organ transplantation and organ donation. Patient agrees to further discuss with health care team members and support system members, as well as to utilize available resources and express questions and/or concerns. Resources and information " provided and reviewed.    Patient reports motivation to proceed with living organ donor transplantation as scheduled.    Suitability for Transplant: From a psychosocial perspective, patient presents as a  moderate risk  candidate for organ transplant at this time due to this SW's increased suspicion of possible ETOH use disorder given this SW's observation of pt's physical presentation (hand trembling and shifting in seat) in clinic today in the context of pt's reported almost daily ETOH use.  SW also has concern for possibility of the presence of ongoing anxiety symptoms which are not being adequately managed at this time.  Patient states does have a caregiver plan, transportation plan, and lodging plan in place. Patient states that patient does have medical and prescription medicine insurance in place and does have a plan in place to afford post-transplant costs.    Patient provided verbal permission to release any necessary information to outside resources for patient care and discharge planning.  Resources and information provided and reviewed.  Patient is choosing has no specific agency preferences.    Pharmacy Name: not specified  Pharmacy Contact Information: N/A    Patient states that he is aware of what patient's normal copays and deductibles are for prescription medicines.       provided psychosocial support, counseling, resources, education, assistance, and discharge planning.  remains available.    Recommendations/Additional Comments: SW recommending PETH test be added to pt's pre-op labs today to provide more insight into pt's ETOH use prior to transplant surgery.  WILLEM Colette Zhou NP to place order for PETH.  SW recommends that pt conduct fundraising to assist pt with pay for cost of medications, food, gas, and other transplant related needs.  SW recommends that pt remain aware of potential mental health concerns and contact the team if any concerns arise.  SW recommends that  pt remain abstinent from tobacco, ETOH, and drug use.  SW supports pt's continued adherence. SW remains available to answer any questions or concerns that arise as the pt moves through the transplant process.    Patient states is aware of [facility] affiliation and/or partnership with agencies in home health care, LTAC, SNF, [facility] and other hospitals and clinics.

## 2023-07-19 ENCOUNTER — TELEPHONE (OUTPATIENT)
Dept: TRANSPLANT | Facility: CLINIC | Age: 63
End: 2023-07-19
Payer: COMMERCIAL

## 2023-07-19 NOTE — PROGRESS NOTES
PRE-OP TEACHING NOTE    Heath Hernandez is here today for pre-op appointments.  Pre-op instructions reviewed and written information was provided.  Post kidney transplant education book provided. All questions were answered.  Discussed possibility that surgery may be rescheduled if the program is busy with  donor transplants.  Patient agreed and verbalized understanding of all instructions.

## 2023-07-19 NOTE — TELEPHONE ENCOUNTER
Results of preop testing reviewed with Dr. Turner. Will repeat LFTs next week. If unchanged or lower, will proceed with surgery as planned.   Patient verbalized understanding. Orders sent for local lab.

## 2023-07-25 LAB
ALBUMIN SERPL-MCNC: 3.8 G/DL (ref 3.8–4.9)
ALBUMIN/GLOB SERPL: 1.5 {RATIO} (ref 1.2–2.2)
ALP SERPL-CCNC: 82 IU/L (ref 44–121)
ALT SERPL-CCNC: 30 IU/L (ref 0–44)
AST SERPL-CCNC: 23 IU/L (ref 0–40)
BILIRUB SERPL-MCNC: 0.2 MG/DL (ref 0–1.2)
BUN SERPL-MCNC: 75 MG/DL (ref 8–27)
BUN/CREAT SERPL: 6 (ref 10–24)
CALCIUM SERPL-MCNC: 9 MG/DL (ref 8.6–10.2)
CHLORIDE SERPL-SCNC: 106 MMOL/L (ref 96–106)
CO2 SERPL-SCNC: 22 MMOL/L (ref 20–29)
CREAT SERPL-MCNC: 12.84 MG/DL (ref 0.76–1.27)
EST. GFR  (NO RACE VARIABLE): 4 ML/MIN/1.73
GLOBULIN SER CALC-MCNC: 2.6 G/DL (ref 1.5–4.5)
GLUCOSE SERPL-MCNC: 177 MG/DL (ref 70–99)
POTASSIUM SERPL-SCNC: 5.5 MMOL/L (ref 3.5–5.2)
PROT SERPL-MCNC: 6.4 G/DL (ref 6–8.5)
SODIUM SERPL-SCNC: 144 MMOL/L (ref 134–144)

## 2023-07-25 NOTE — PROGRESS NOTES
Please send this blood work to the referring nephrologist  Advise low K diet   Advise patient to reach out his nephrologist office to discuss Potassium management

## 2023-07-26 ENCOUNTER — TELEPHONE (OUTPATIENT)
Dept: TRANSPLANT | Facility: CLINIC | Age: 63
End: 2023-07-26
Payer: COMMERCIAL

## 2023-07-26 NOTE — TELEPHONE ENCOUNTER
7/26/23 Wednesday discussion  Recipient scheduled for LD tx Monday 7/31/23. LFTs elevated, repeat normal. PETH shows moderate alcohol consumption.  ____________________________  Plan: repeat PETH in 1 month

## 2023-07-26 NOTE — TELEPHONE ENCOUNTER
Pt was called regarding PETH result which was 146. Pt states he stopped drinking alcohol in last 7-8 days. His LFT result , Platelet and albumin in normal range. After long discussion about the result and plant, pt agrees to have another PETH late next week to trend the test. If test result acceptable for transplant team, we will arrange kidney tx surgery in 4 weeks.   Pt and his wife verbalized understanding the plan.

## 2023-08-08 PROCEDURE — 99001 SPECIMEN HANDLING PT-LAB: CPT | Mod: PO,TXP | Performed by: NURSE PRACTITIONER

## 2023-08-16 ENCOUNTER — TELEPHONE (OUTPATIENT)
Dept: TRANSPLANT | Facility: CLINIC | Age: 63
End: 2023-08-16
Payer: COMMERCIAL

## 2023-08-16 ENCOUNTER — LAB VISIT (OUTPATIENT)
Dept: LAB | Facility: HOSPITAL | Age: 63
End: 2023-08-16
Payer: COMMERCIAL

## 2023-08-16 DIAGNOSIS — Z76.82 ORGAN TRANSPLANT CANDIDATE: ICD-10-CM

## 2023-08-16 DIAGNOSIS — Z76.82 ORGAN TRANSPLANT CANDIDATE: Primary | ICD-10-CM

## 2023-08-16 NOTE — TELEPHONE ENCOUNTER
PRE-SCHEDULING LD SURGERY CHECKLIST    Living donor surgery planned 10/9/23    Preop testing planned 9/26/23      Recipient:      Surgeon  Nephrologist  Stress test 5/16/23 ex stress ok acceptable   2D Echo 12/27/22   ok acceptable         CXR 7/18/23 ok acceptable   Renal US 12/27/22 ok Acceptable    Imaging 10/6/21 pel xr - ok Ok, Pelvic and Iliac doppler are good but are almost 2 years old though acceptable         ABO  O ok Compatible.   Serologies reviewed ok reviewed   PTH 12/27/22 - 96.9 ok acceptable   Lab reviewed  Follow up K prior to tx         Cancer Screenings Up to date  OK per current guidelines         Pt. Specific issues Reschedule from July. Pt. PETH negative, in media. LFTs  ok  Elevated Potasium. Suggest to monitor K a few days prior to transplant by his local nephrologist.       Donor:       Surgeon  Nephrologist  ABO compatible O ok Ok         Serologies/Lab Reviewed  ok acceptable         Imaging Reviewed  ok acceptable

## 2023-08-16 NOTE — TELEPHONE ENCOUNTER
Spoke with patient to confirm new surgery date of 10/9/23. Will schedule preop testing 9/26/23. Patient verbalized understanding.

## 2023-09-22 PROBLEM — N18.4 CKD (CHRONIC KIDNEY DISEASE) STAGE 4, GFR 15-29 ML/MIN: Status: RESOLVED | Noted: 2021-10-06 | Resolved: 2023-09-22

## 2023-09-22 NOTE — PROGRESS NOTES
Kidney Transplant Recipient Preoperative Evaluation    Subjective:     CC:  Preoperative evaluation of kidney transplant candidacy.    HPI:  Mr. Hernandez is a 62 y.o. year old White male who has been approved to receive a living related kidney transplant.  He has advanced kidney disease secondary to diabetic nephropathy and IgA nephropathy.  Patient is on peritoneal dialysis.  He has presented for his final preoperative medical evaluation. The Pt is currently on PD .Denies peritonitis .  He denies any recent hospitalizations or ED visits.      Looks good, not frail.   Overall feels well. No health concerns today.   Denies chest pain, SOB, leg pain, abdominal pain or LUTs.    Kidney txp scheduled for  10/9/23      Lab /diagnostic results / txp wk up reviewed      10/6/21 Iliacs: triphasic bilaterally   10/6/21 CXR: No active cardiac or pulmonary findings  10/6/21 PXR No evidence of an unusually advanced degree of iliac arterial vascular calcification in the pelvis  3/17/20 colonoscopy--repeat due 10/2024  10/6/21 renal uS Bosniak 2F complex cyst in the right middle pole measuring up to 2 cm. Recommend further assessment with contrast dedicated root renal ultrasound or contrast MR or CT. Multiple left simple renal cysts.     Urology         ([place]) 21 Rosa Bynum MD:  Assessment & Plan   Note: He returns to discuss the results of his Abdominal MRI 21. It showed small bilateral renal cyst hemorrhagia. It also showed an indeterminate lession 2.1 cm to the right hepatic lobe. He comes in for evaluation of the renal cyst due to needing a kidney transplants for CKD Stage 5   LAST EDITED BY ROSA BYNUM MD     To whom it may concern, Mr. Heath Hernandez,, 1960, has been cleared for surgery      22 TTE  The left ventricle is mildly enlarged with normal systolic function.  The estimated ejection fraction is 60%.  There is mildly abnormal septal wall motion with small areas of quite mild  "wall motion abnormality .  Moderate left atrial enlargement.  Grade I left ventricular diastolic dysfunction.  Mild right ventricular enlargement.  Moderate right atrial enlargement.  Mild tricuspid regurgitation.  Normal central venous pressure (3 mmHg).  The estimated PA systolic pressure is 41 mmHg.  There is mild pulmonary hypertension.  Small posterior pericardial effusion. Trivial under the RA.     STRESS TEST         (Outside) 11/4/21  ECG Stress:  The ECG chemical " stress portion of the study was normal.  Nuclear Perfusion  The nuclear portion of the study was abnormal.   EF/Regional Thickening;  The ejection fraction and regional thickening on the study was normal.  Overall Conclusion of Test;  Nuclear stress test with no reversible ischemia noted on imaging. Stress portion adequate, and noted        Past Medical History:   Diagnosis Date    Anxiety     Cancer     Skin Cancer    Diabetes mellitus     Diabetes mellitus, type 2     Disorder of kidney and ureter     Hyperlipidemia     Hypertension      Social History     Socioeconomic History    Marital status:      Spouse name: Krystle Hernandez    Number of children: 2   Tobacco Use    Smoking status: Never    Smokeless tobacco: Never   Substance and Sexual Activity    Alcohol use: Yes     Alcohol/week: 15.0 standard drinks of alcohol     Types: 15 Cans of beer per week     Comment: 15 beers in a week    Drug use: Not Currently    Sexual activity: Not Currently   Social History Narrative    Spoke to patient to complete his history for his upcoming appointment his wife will come with him to his appointment he is aware that he have to bring a small breakfast/snack to eat after blood is drawn he will not need a     No family history on file.   Past Surgical History:   Procedure Laterality Date    TONSILLECTOMY        Review of Systems   Constitutional:  Negative for activity change, appetite change, chills, fatigue, fever and unexpected weight " "change.   HENT:  Negative for congestion, facial swelling, postnasal drip, rhinorrhea, sinus pressure, sore throat and trouble swallowing.    Eyes:  Negative for pain, redness and visual disturbance.   Respiratory:  Negative for cough, chest tightness, shortness of breath and wheezing.    Cardiovascular: Negative.  Negative for chest pain, palpitations and leg swelling.   Gastrointestinal:  Negative for abdominal pain, diarrhea, nausea and vomiting.   Genitourinary:  Negative for dysuria, flank pain and urgency.   Musculoskeletal:  Negative for gait problem, neck pain and neck stiffness.   Skin:  Negative for rash.   Allergic/Immunologic: Negative for environmental allergies, food allergies and immunocompromised state.   Neurological:  Negative for dizziness, weakness, light-headedness and headaches.   Psychiatric/Behavioral:  Negative for agitation and confusion. The patient is not nervous/anxious.        Objective:     Blood pressure 125/70, pulse 72, temperature 97.2 °F (36.2 °C), temperature source Temporal, resp. rate 16, height 5' 9.84" (1.774 m), weight 81.9 kg (180 lb 8.9 oz), SpO2 99 %.  Physical Exam  Vitals reviewed.   Constitutional:       Appearance: Normal appearance. He is well-developed.   HENT:      Head: Normocephalic.   Eyes:      Pupils: Pupils are equal, round, and reactive to light.   Cardiovascular:      Rate and Rhythm: Normal rate and regular rhythm.      Heart sounds: Normal heart sounds.   Pulmonary:      Effort: Pulmonary effort is normal.      Breath sounds: Normal breath sounds.   Abdominal:      General: Bowel sounds are normal.      Palpations: Abdomen is soft.          Comments: PD catheter , no erythema, edema, tenderness or drainage.     Musculoskeletal:         General: Normal range of motion.      Cervical back: Normal range of motion and neck supple.   Skin:     General: Skin is warm and dry.   Neurological:      Mental Status: He is alert and oriented to person, place, and " time.      Motor: No abnormal muscle tone.   Psychiatric:         Behavior: Behavior normal.         Labs:  9/26/2023: Prothrombin Time 10.9 sec (Ref range: 9.0 - 12.5 sec)  Labs were reviewed with the patient.    ABO type: O  Lab Results   Component Value Date    WBC 7.51 09/26/2023    HGB 11.9 (L) 09/26/2023    HCT 36.6 (L) 09/26/2023    MCV 92 09/26/2023     09/26/2023       Lab Results   Component Value Date    CREATININE 15.9 (H) 09/26/2023    BUN 99 (H) 09/26/2023     09/26/2023    K 4.9 09/26/2023     09/26/2023    CO2 25 09/26/2023     Lab Results   Component Value Date    ALT 21 09/26/2023    AST 17 09/26/2023    ALKPHOS 57 09/26/2023    BILITOT 0.6 09/26/2023     PSA, Screen (ng/mL)   Date Value   12/27/2022 0.84       Assessment:     1. Pre-op examination    2. Patient on waiting list for kidney transplant    3. Type 2 diabetes mellitus with chronic kidney disease on chronic dialysis, with long-term current use of insulin    4. Benign hypertension    5. IgA nephropathy          Plan:    LFTs elevated, SW concerns with alc consumption --discuss with group tomorrow,    Lab Results   Component Value Date    ALT 21 09/26/2023    AST 17 09/26/2023    ALKPHOS 57 09/26/2023    BILITOT 0.6 09/26/2023       Transplant Candidacy:   Based on available information, Mr. Hernandez remains a suitable kidney transplant candidate otherwise .  He may proceed with transplant surgery as planned.    Colette Zhou NP       Follow-up:  After the transplant, the patient will follow-up with the kidney transplant team and get blood work per standard protocol as described below.    Clinic: return to transplant clinic weekly for the first month after transplant; every 2 weeks during months 2-3; then at 6-, 9-, 12-, 18-, 24-, and 36- months post-transplant to reassess for complications from immunosuppression and monitor for rejection.  Annually thereafter.     Labs: continue twice weekly CBC, renal panel, and drug  level for first month; then same labs once weekly through 3rd month post-transplant.  Urine for UA, protein/creatinine ratio monthly.  Add urine BK - PCR at 1-, 3-, 6-, 9-, 12-, 18-, 24-, and 36- months post-transplant.  Hepatic panel at 1-, 2-, 3-, 6-, 9-, 12-, 18-, 24-, and 36- months post-transplant.    Counseling:     Patient advised that it is recommended that all transplant candidates, and their close contacts and household members receive Covid vaccination.

## 2023-09-26 ENCOUNTER — OFFICE VISIT (OUTPATIENT)
Dept: TRANSPLANT | Facility: CLINIC | Age: 63
End: 2023-09-26
Payer: COMMERCIAL

## 2023-09-26 ENCOUNTER — HOSPITAL ENCOUNTER (OUTPATIENT)
Dept: CARDIOLOGY | Facility: CLINIC | Age: 63
Discharge: HOME | End: 2023-09-26
Payer: COMMERCIAL

## 2023-09-26 ENCOUNTER — SOCIAL WORK (OUTPATIENT)
Dept: TRANSPLANT | Facility: CLINIC | Age: 63
End: 2023-09-26
Payer: COMMERCIAL

## 2023-09-26 ENCOUNTER — HOSPITAL ENCOUNTER (OUTPATIENT)
Dept: RADIOLOGY | Facility: HOSPITAL | Age: 63
Discharge: HOME | End: 2023-09-26
Attending: NURSE PRACTITIONER
Payer: COMMERCIAL

## 2023-09-26 ENCOUNTER — CLINICAL SUPPORT (OUTPATIENT)
Dept: TRANSPLANT | Facility: CLINIC | Age: 63
End: 2023-09-26
Payer: COMMERCIAL

## 2023-09-26 VITALS
HEIGHT: 70 IN | HEART RATE: 72 BPM | TEMPERATURE: 97 F | WEIGHT: 180.56 LBS | OXYGEN SATURATION: 99 % | SYSTOLIC BLOOD PRESSURE: 125 MMHG | HEIGHT: 70 IN | DIASTOLIC BLOOD PRESSURE: 70 MMHG | DIASTOLIC BLOOD PRESSURE: 70 MMHG | TEMPERATURE: 97 F | OXYGEN SATURATION: 99 % | BODY MASS INDEX: 25.85 KG/M2 | BODY MASS INDEX: 25.85 KG/M2 | WEIGHT: 180.56 LBS | SYSTOLIC BLOOD PRESSURE: 125 MMHG | SYSTOLIC BLOOD PRESSURE: 125 MMHG | RESPIRATION RATE: 16 BRPM | HEIGHT: 70 IN | HEART RATE: 72 BPM | DIASTOLIC BLOOD PRESSURE: 70 MMHG | WEIGHT: 180.56 LBS | RESPIRATION RATE: 16 BRPM | RESPIRATION RATE: 16 BRPM | TEMPERATURE: 97 F | HEART RATE: 72 BPM | OXYGEN SATURATION: 99 % | BODY MASS INDEX: 25.85 KG/M2

## 2023-09-26 DIAGNOSIS — N02.B9 IGA NEPHROPATHY: ICD-10-CM

## 2023-09-26 DIAGNOSIS — I10 BENIGN HYPERTENSION: ICD-10-CM

## 2023-09-26 DIAGNOSIS — N18.6 TYPE 2 DIABETES MELLITUS WITH CHRONIC KIDNEY DISEASE ON CHRONIC DIALYSIS, WITH LONG-TERM CURRENT USE OF INSULIN: ICD-10-CM

## 2023-09-26 DIAGNOSIS — E11.22 TYPE 2 DIABETES MELLITUS WITH CHRONIC KIDNEY DISEASE ON CHRONIC DIALYSIS, WITH LONG-TERM CURRENT USE OF INSULIN: ICD-10-CM

## 2023-09-26 DIAGNOSIS — Z79.4 TYPE 2 DIABETES MELLITUS WITH CHRONIC KIDNEY DISEASE ON CHRONIC DIALYSIS, WITH LONG-TERM CURRENT USE OF INSULIN: ICD-10-CM

## 2023-09-26 DIAGNOSIS — N18.6 TYPE 2 DIABETES MELLITUS WITH CHRONIC KIDNEY DISEASE ON CHRONIC DIALYSIS, WITH LONG-TERM CURRENT USE OF INSULIN: Primary | ICD-10-CM

## 2023-09-26 DIAGNOSIS — Z76.82 ORGAN TRANSPLANT CANDIDATE: ICD-10-CM

## 2023-09-26 DIAGNOSIS — Z79.4 TYPE 2 DIABETES MELLITUS WITH CHRONIC KIDNEY DISEASE ON CHRONIC DIALYSIS, WITH LONG-TERM CURRENT USE OF INSULIN: Primary | ICD-10-CM

## 2023-09-26 DIAGNOSIS — Z99.2 TYPE 2 DIABETES MELLITUS WITH CHRONIC KIDNEY DISEASE ON CHRONIC DIALYSIS, WITH LONG-TERM CURRENT USE OF INSULIN: Primary | ICD-10-CM

## 2023-09-26 DIAGNOSIS — N18.6 ESRD (END STAGE RENAL DISEASE): ICD-10-CM

## 2023-09-26 DIAGNOSIS — Z76.82 PATIENT ON WAITING LIST FOR KIDNEY TRANSPLANT: ICD-10-CM

## 2023-09-26 DIAGNOSIS — E11.22 TYPE 2 DIABETES MELLITUS WITH CHRONIC KIDNEY DISEASE ON CHRONIC DIALYSIS, WITH LONG-TERM CURRENT USE OF INSULIN: Primary | ICD-10-CM

## 2023-09-26 DIAGNOSIS — Z01.818 PRE-OP EXAMINATION: Primary | Chronic | ICD-10-CM

## 2023-09-26 DIAGNOSIS — Z99.2 TYPE 2 DIABETES MELLITUS WITH CHRONIC KIDNEY DISEASE ON CHRONIC DIALYSIS, WITH LONG-TERM CURRENT USE OF INSULIN: ICD-10-CM

## 2023-09-26 PROCEDURE — 93005 ELECTROCARDIOGRAM TRACING: CPT | Mod: S$GLB,TXP,, | Performed by: NURSE PRACTITIONER

## 2023-09-26 PROCEDURE — 71046 X-RAY EXAM CHEST 2 VIEWS: CPT | Mod: TC,FY,TXP

## 2023-09-26 PROCEDURE — 1159F MED LIST DOCD IN RCRD: CPT | Mod: CPTII,S$GLB,TXP, | Performed by: NURSE PRACTITIONER

## 2023-09-26 PROCEDURE — 4010F PR ACE/ARB THEARPY RXD/TAKEN: ICD-10-PCS | Mod: CPTII,S$GLB,TXP, | Performed by: NURSE PRACTITIONER

## 2023-09-26 PROCEDURE — 93010 ELECTROCARDIOGRAM REPORT: CPT | Mod: S$GLB,TXP,, | Performed by: INTERNAL MEDICINE

## 2023-09-26 PROCEDURE — 99215 OFFICE O/P EST HI 40 MIN: CPT | Mod: S$GLB,TXP,, | Performed by: NURSE PRACTITIONER

## 2023-09-26 PROCEDURE — 4010F ACE/ARB THERAPY RXD/TAKEN: CPT | Mod: CPTII,S$GLB,TXP, | Performed by: NURSE PRACTITIONER

## 2023-09-26 PROCEDURE — 3078F PR MOST RECENT DIASTOLIC BLOOD PRESSURE < 80 MM HG: ICD-10-PCS | Mod: CPTII,S$GLB,TXP, | Performed by: NURSE PRACTITIONER

## 2023-09-26 PROCEDURE — 3074F PR MOST RECENT SYSTOLIC BLOOD PRESSURE < 130 MM HG: ICD-10-PCS | Mod: CPTII,S$GLB,TXP, | Performed by: NURSE PRACTITIONER

## 2023-09-26 PROCEDURE — 1159F PR MEDICATION LIST DOCUMENTED IN MEDICAL RECORD: ICD-10-PCS | Mod: CPTII,S$GLB,TXP, | Performed by: NURSE PRACTITIONER

## 2023-09-26 PROCEDURE — 3008F BODY MASS INDEX DOCD: CPT | Mod: CPTII,S$GLB,TXP, | Performed by: NURSE PRACTITIONER

## 2023-09-26 PROCEDURE — 99999 PR PBB SHADOW E&M-EST. PATIENT-LVL III: ICD-10-PCS | Mod: PBBFAC,TXP,,

## 2023-09-26 PROCEDURE — 1160F PR REVIEW ALL MEDS BY PRESCRIBER/CLIN PHARMACIST DOCUMENTED: ICD-10-PCS | Mod: CPTII,S$GLB,TXP, | Performed by: NURSE PRACTITIONER

## 2023-09-26 PROCEDURE — 71046 XR CHEST PA AND LATERAL: ICD-10-PCS | Mod: 26,TXP,, | Performed by: RADIOLOGY

## 2023-09-26 PROCEDURE — 99999 PR PBB SHADOW E&M-EST. PATIENT-LVL III: CPT | Mod: PBBFAC,TXP,,

## 2023-09-26 PROCEDURE — 99999 PR PBB SHADOW E&M-EST. PATIENT-LVL I: ICD-10-PCS | Mod: PBBFAC,TXP,,

## 2023-09-26 PROCEDURE — 99999 PR PBB SHADOW E&M-EST. PATIENT-LVL IV: CPT | Mod: PBBFAC,TXP,, | Performed by: NURSE PRACTITIONER

## 2023-09-26 PROCEDURE — 99999 PR PBB SHADOW E&M-EST. PATIENT-LVL I: CPT | Mod: PBBFAC,TXP,,

## 2023-09-26 PROCEDURE — 99499 NO LOS: ICD-10-PCS | Mod: S$GLB,TXP,, | Performed by: SURGERY

## 2023-09-26 PROCEDURE — 93005 EKG 12-LEAD: ICD-10-PCS | Mod: S$GLB,TXP,, | Performed by: NURSE PRACTITIONER

## 2023-09-26 PROCEDURE — 71046 X-RAY EXAM CHEST 2 VIEWS: CPT | Mod: 26,TXP,, | Performed by: RADIOLOGY

## 2023-09-26 PROCEDURE — 3074F SYST BP LT 130 MM HG: CPT | Mod: CPTII,S$GLB,TXP, | Performed by: NURSE PRACTITIONER

## 2023-09-26 PROCEDURE — 99499 UNLISTED E&M SERVICE: CPT | Mod: S$GLB,TXP,, | Performed by: SURGERY

## 2023-09-26 PROCEDURE — 99215 PR OFFICE/OUTPT VISIT, EST, LEVL V, 40-54 MIN: ICD-10-PCS | Mod: S$GLB,TXP,, | Performed by: NURSE PRACTITIONER

## 2023-09-26 PROCEDURE — 1160F RVW MEDS BY RX/DR IN RCRD: CPT | Mod: CPTII,S$GLB,TXP, | Performed by: NURSE PRACTITIONER

## 2023-09-26 PROCEDURE — 93010 EKG 12-LEAD: ICD-10-PCS | Mod: S$GLB,TXP,, | Performed by: INTERNAL MEDICINE

## 2023-09-26 PROCEDURE — 3078F DIAST BP <80 MM HG: CPT | Mod: CPTII,S$GLB,TXP, | Performed by: NURSE PRACTITIONER

## 2023-09-26 PROCEDURE — 99999 PR PBB SHADOW E&M-EST. PATIENT-LVL IV: ICD-10-PCS | Mod: PBBFAC,TXP,, | Performed by: NURSE PRACTITIONER

## 2023-09-26 PROCEDURE — 3008F PR BODY MASS INDEX (BMI) DOCUMENTED: ICD-10-PCS | Mod: CPTII,S$GLB,TXP, | Performed by: NURSE PRACTITIONER

## 2023-09-26 RX ORDER — ACETAMINOPHEN 650 MG/20.3ML
650 LIQUID ORAL ONCE
Status: CANCELLED | OUTPATIENT
Start: 2023-09-26 | End: 2023-09-26

## 2023-09-26 RX ORDER — MUPIROCIN 20 MG/G
OINTMENT TOPICAL
Status: CANCELLED | OUTPATIENT
Start: 2023-09-26

## 2023-09-26 RX ORDER — CEFAZOLIN SODIUM 2 G/50ML
2 SOLUTION INTRAVENOUS
Status: CANCELLED | OUTPATIENT
Start: 2023-09-26

## 2023-09-26 RX ORDER — DIPHENHYDRAMINE HYDROCHLORIDE 50 MG/ML
50 INJECTION INTRAMUSCULAR; INTRAVENOUS ONCE
Status: CANCELLED | OUTPATIENT
Start: 2023-09-26 | End: 2023-09-26

## 2023-09-26 RX ORDER — HEPARIN SODIUM 5000 [USP'U]/ML
5000 INJECTION, SOLUTION INTRAVENOUS; SUBCUTANEOUS ONCE
Status: CANCELLED | OUTPATIENT
Start: 2023-09-26 | End: 2023-09-26

## 2023-09-26 RX ORDER — SODIUM CHLORIDE 0.9 % (FLUSH) 0.9 %
10 SYRINGE (ML) INJECTION
Status: CANCELLED | OUTPATIENT
Start: 2023-09-26

## 2023-09-26 RX ORDER — PREGABALIN 75 MG/1
75 CAPSULE ORAL
Status: CANCELLED | OUTPATIENT
Start: 2023-09-26

## 2023-09-26 NOTE — H&P (VIEW-ONLY)
Transplant Surgery  Kidney Transplant Recipient Evaluation    Referring Physician: Jannet Farias  Current Nephrologist: Jannet Farias    Subjective:     Reason for Visit: evaluate transplant candidacy    History of Present Illness: Heath Hernandez is a 62 y.o. year old male undergoing transplant evaluation.    Dialysis History: Heath is on peritoneal dialysis.      Transplant History: N/A    Etiology of Renal Disease: Diabetes Mellitus - Type II (based on medical records from referral).    External provider notes reviewed: No    Review of Systems   Constitutional:  Negative for activity change, appetite change, chills and fever.   Respiratory:  Negative for cough and shortness of breath.    Cardiovascular:  Negative for chest pain and leg swelling.   Gastrointestinal:  Negative for abdominal distention, constipation, diarrhea, nausea and vomiting.   Genitourinary:  Negative for difficulty urinating and dysuria.   Skin:  Negative for color change and rash.   Neurological:  Negative for dizziness and light-headedness.   All other systems reviewed and are negative.    Objective:     Physical Exam:  Constitutional:   Vitals reviewed: yes   Well-nourished and well-groomed: yes  Eyes:   Sclerae icteric: no   Extraocular movements intact: yes  GI:    Bowel sounds normal: yes   Tenderness: no    If yes, quadrant/location: not applicable   Palpable masses: no    If yes, quadrant/location: not applicable   Hepatosplenomegaly: no   Ascites: no   Hernia: no    If yes, type/location: not applicable   Surgical scars: yes    If yes, type/location: PD catheter   Resp:   Effort normal: yes   Breath sounds normal: yes    CV:   Regular rate and rhythm: yes   Heart sounds normal: yes   Femoral pulses normal: yes   Extremities edematous: no  Skin:   Rashes or lesions present: no    If yes, describe:not applicable   Jaundice:: no    Musculoskeletal:   Gait normal: yes   Strength normal: yes  Psych:   Oriented to person, place, and  time: yes   Affect and mood normal: yes    Additional comments: not applicable    Diagnostics:  The following labs have been reviewed: CBC  CMP  INR  The following radiology images have been independently reviewed and interpreted: Pelvic Xray    Counseling: We provided Heath PIERCE Pravinyamila with a group education session today.  We discussed kidney transplantation at length with him, including risks, potential complications, and alternatives in the management of his renal failure.  The discussion included complications related to anesthesia, bleeding, infection, primary nonfunction, and ATN.  I discussed the typical postoperative course, length of hospitalization, the need for long-term immunosuppression, and the need for long-term routine follow-up.  I discussed living-donor and -donor transplantation and the relative advantages and disadvantages of each.  I also discussed average waiting times for both living donation and  donation.  I discussed national and center-specific survival rates.  I also mentioned the potential benefit of multicenter listing to candidates listed with centers within more than one organ procurement organization.  All questions were answered.    Patient advised that it is recommended that all transplant candidates, and their close contacts and household members receive Covid vaccination.        Coronavirus disease (COVID-19) caused by severe acute respiratory virus coronavirus 2 (SARS-C0V 2) is associated with increased mortality in solid organ transplant recipients (SOT) compared to non-transplant patients. Vaccine responses to vaccination are depressed against SARS-CoV2 compared to normal individuals but improve with third vaccination doses. Vaccination prior to SOT provides both the best opportunity for transplant candidates to develop protective immunity and to reduce the risk of serious COVID19 infections post transplantation. Organ transplant candidates at [facility]  [facility] will be required to receive SARS-CoV-2 vaccination prior to being listed with a an active status, whenever possible. Exceptions will be made for disability related reasons or for sincerely held [employer] beliefs.          Transplant Surgery - Candidacy   Assessment/Plan:   Heath Hernandez has end stage renal disease (ESRD) on dialysis. I see no surgical contraindication to placing a kidney transplant. Based on available information, Heath Hernandez is an excellent kidney transplant candidate.     Additional testing to be completed according to the Written Order Guidelines for Adult Pre-kidney and Pancreas Transplant Evaluation (KI-02).  Interpretation of tests and discussion of patient management involves all members of the multidisciplinary transplant team.    Jason Norman MD

## 2023-09-26 NOTE — PROGRESS NOTES
PRE-OP TEACHING NOTE    Heath Hernandez is here today for pre-op appointments.  Pre-op instructions reviewed and written information was provided.  All questions were answered.  Discussed possibility that surgery may be rescheduled if the program is busy with  donor transplants.  Patient agreed and verbalized understanding of all instructions.

## 2023-09-26 NOTE — H&P
Transplant Surgery  Kidney Transplant Recipient Evaluation    Referring Physician: Jannet Farias  Current Nephrologist: Jannet Farias    Subjective:     Reason for Visit: evaluate transplant candidacy    History of Present Illness: Heath Hernandez is a 62 y.o. year old male undergoing transplant evaluation.    Dialysis History: Heath is on peritoneal dialysis.      Transplant History: N/A    Etiology of Renal Disease: Diabetes Mellitus - Type II (based on medical records from referral).    External provider notes reviewed: No    Review of Systems   Constitutional:  Negative for activity change, appetite change, chills and fever.   Respiratory:  Negative for cough and shortness of breath.    Cardiovascular:  Negative for chest pain and leg swelling.   Gastrointestinal:  Negative for abdominal distention, constipation, diarrhea, nausea and vomiting.   Genitourinary:  Negative for difficulty urinating and dysuria.   Skin:  Negative for color change and rash.   Neurological:  Negative for dizziness and light-headedness.   All other systems reviewed and are negative.    Objective:     Physical Exam:  Constitutional:   Vitals reviewed: yes   Well-nourished and well-groomed: yes  Eyes:   Sclerae icteric: no   Extraocular movements intact: yes  GI:    Bowel sounds normal: yes   Tenderness: no    If yes, quadrant/location: not applicable   Palpable masses: no    If yes, quadrant/location: not applicable   Hepatosplenomegaly: no   Ascites: no   Hernia: no    If yes, type/location: not applicable   Surgical scars: yes    If yes, type/location: PD catheter   Resp:   Effort normal: yes   Breath sounds normal: yes    CV:   Regular rate and rhythm: yes   Heart sounds normal: yes   Femoral pulses normal: yes   Extremities edematous: no  Skin:   Rashes or lesions present: no    If yes, describe:not applicable   Jaundice:: no    Musculoskeletal:   Gait normal: yes   Strength normal: yes  Psych:   Oriented to person, place, and  time: yes   Affect and mood normal: yes    Additional comments: not applicable    Diagnostics:  The following labs have been reviewed: CBC  CMP  INR  The following radiology images have been independently reviewed and interpreted: Pelvic Xray    Counseling: We provided Heath PIERCE Pravinyamila with a group education session today.  We discussed kidney transplantation at length with him, including risks, potential complications, and alternatives in the management of his renal failure.  The discussion included complications related to anesthesia, bleeding, infection, primary nonfunction, and ATN.  I discussed the typical postoperative course, length of hospitalization, the need for long-term immunosuppression, and the need for long-term routine follow-up.  I discussed living-donor and -donor transplantation and the relative advantages and disadvantages of each.  I also discussed average waiting times for both living donation and  donation.  I discussed national and center-specific survival rates.  I also mentioned the potential benefit of multicenter listing to candidates listed with centers within more than one organ procurement organization.  All questions were answered.    Patient advised that it is recommended that all transplant candidates, and their close contacts and household members receive Covid vaccination.        Coronavirus disease (COVID-19) caused by severe acute respiratory virus coronavirus 2 (SARS-C0V 2) is associated with increased mortality in solid organ transplant recipients (SOT) compared to non-transplant patients. Vaccine responses to vaccination are depressed against SARS-CoV2 compared to normal individuals but improve with third vaccination doses. Vaccination prior to SOT provides both the best opportunity for transplant candidates to develop protective immunity and to reduce the risk of serious COVID19 infections post transplantation. Organ transplant candidates at [facility]  [facility] will be required to receive SARS-CoV-2 vaccination prior to being listed with a an active status, whenever possible. Exceptions will be made for disability related reasons or for sincerely held [employer] beliefs.          Transplant Surgery - Candidacy   Assessment/Plan:   Heath Hernandez has end stage renal disease (ESRD) on dialysis. I see no surgical contraindication to placing a kidney transplant. Based on available information, Heath Hernnadez is an excellent kidney transplant candidate.     Additional testing to be completed according to the Written Order Guidelines for Adult Pre-kidney and Pancreas Transplant Evaluation (KI-02).  Interpretation of tests and discussion of patient management involves all members of the multidisciplinary transplant team.    Jason Norman MD

## 2023-09-26 NOTE — PROGRESS NOTES
Transplant Surgery  Kidney Transplant Recipient Evaluation    Referring Physician: Jannet Farias  Current Nephrologist: Jannet Farias    Subjective:     Reason for Visit: evaluate transplant candidacy    History of Present Illness: Heath Hernandez is a 62 y.o. year old male undergoing transplant evaluation.    Dialysis History: Heath is on peritoneal dialysis.      Transplant History: N/A    Etiology of Renal Disease: Diabetes Mellitus - Type II (based on medical records from referral).    External provider notes reviewed: No    Review of Systems   Constitutional:  Negative for activity change, appetite change, chills and fever.   Respiratory:  Negative for cough and shortness of breath.    Cardiovascular:  Negative for chest pain and leg swelling.   Gastrointestinal:  Negative for abdominal distention, constipation, diarrhea, nausea and vomiting.   Genitourinary:  Negative for difficulty urinating and dysuria.   Skin:  Negative for color change and rash.   Neurological:  Negative for dizziness and light-headedness.   All other systems reviewed and are negative.    Objective:     Physical Exam:  Constitutional:   Vitals reviewed: yes   Well-nourished and well-groomed: yes  Eyes:   Sclerae icteric: no   Extraocular movements intact: yes  GI:    Bowel sounds normal: yes   Tenderness: no    If yes, quadrant/location: not applicable   Palpable masses: no    If yes, quadrant/location: not applicable   Hepatosplenomegaly: no   Ascites: no   Hernia: no    If yes, type/location: not applicable   Surgical scars: yes    If yes, type/location: PD catheter   Resp:   Effort normal: yes   Breath sounds normal: yes    CV:   Regular rate and rhythm: yes   Heart sounds normal: yes   Femoral pulses normal: yes   Extremities edematous: no  Skin:   Rashes or lesions present: no    If yes, describe:not applicable   Jaundice:: no    Musculoskeletal:   Gait normal: yes   Strength normal: yes  Psych:   Oriented to person, place, and  time: yes   Affect and mood normal: yes    Additional comments: not applicable    Diagnostics:  The following labs have been reviewed: CBC  CMP  INR  The following radiology images have been independently reviewed and interpreted: Pelvic Xray    Counseling: We provided Heath Hernandez with a group education session today.  We discussed kidney transplantation at length with him, including risks, potential complications, and alternatives in the management of his renal failure.  The discussion included complications related to anesthesia, bleeding, infection, primary nonfunction, and ATN.  I discussed the typical postoperative course, length of hospitalization, the need for long-term immunosuppression, and the need for long-term routine follow-up.  I discussed living-donor and -donor transplantation and the relative advantages and disadvantages of each.  I also discussed average waiting times for both living donation and  donation.  I discussed national and center-specific survival rates.  I also mentioned the potential benefit of multicenter listing to candidates listed with centers within more than one organ procurement organization.  All questions were answered.    Patient advised that it is recommended that all transplant candidates, and their close contacts and household members receive Covid vaccination.    Final determination of transplant candidacy will be made once evaluation is complete and reviewed by the Kidney & Kidney/Pancreas Selection Committee.    Coronavirus disease (COVID-19) caused by severe acute respiratory virus coronavirus 2 (SARS-C0V 2) is associated with increased mortality in solid organ transplant recipients (SOT) compared to non-transplant patients. Vaccine responses to vaccination are depressed against SARS-CoV2 compared to normal individuals but improve with third vaccination doses. Vaccination prior to SOT provides both the best opportunity for transplant candidates to  develop protective immunity and to reduce the risk of serious COVID19 infections post transplantation. Organ transplant candidates at [facility] will be required to receive SARS-CoV-2 vaccination prior to being listed with a an active status, whenever possible. Exceptions will be made for disability related reasons or for sincerely held [employer]ologist beliefs.          Transplant Surgery - Candidacy   Assessment/Plan:   Heath Hernandez has end stage renal disease (ESRD) on dialysis. I see no surgical contraindication to placing a kidney transplant. Based on available information, Heath Hernandez is an excellent kidney transplant candidate.     Additional testing to be completed according to the Written Order Guidelines for Adult Pre-kidney and Pancreas Transplant Evaluation (KI-02).  Interpretation of tests and discussion of patient management involves all members of the multidisciplinary transplant team.    Jason Norman MD

## 2023-09-26 NOTE — PROGRESS NOTES
RECIPIENT PRE-OP NOTE    Potential Recipient Name: Heath Hernandez, [id number]  Encounter Date: 9/26/2023    Sex: male  YOB: 1960  Age: 62 y.o.    Housing/Contact Info:  [address] [place] [address]  Telephone Information:   Mobile [phone]    Home: [phone] (home)  Work: [phone] (work)  E-mail: [email or web address]    Potential Surgery Date: 10/09/2023  Potential Donor Name: Arlene Mcclelland, Clinic Number: [id number]  Potential Donor Relationship:  niece    Patient presents as alert and oriented x 4, pleasant, good eye contact, well groomed, recall good, concentration/judgement good, average intelligence, calm, communicative, cooperative, and asking and answering questions appropriately. Patient presents as a 62 y.o. year old male to recipient pre-op appointment for scheduled kidney living donor surgery. Patient's wife accompanies patient.  Patient states that he is independent with ADLs at this time.  Patient states motivation to pursue organ transplant at this time.    Does patient drive?  Patient is aware will not be able to drive until medically cleared by the transplant team. Patient verbalizes understanding that patient will need assistance for all transportation needs until medically cleared to drive.    Caregivers/Transportation:  Name: Junie Hernandez  Age: 58  Phone: [phone]  Relationship: wife  Does person drive? yes  Does person have own/reliable transportation? yes    Name: Darnell Hernandez  Age: 26  Phone: [phone]  Relationship: son  Does person drive? yes  Does person have own/reliable transportation? yes    Dependents/Others who rely on Patient/Caregiver for care: Patient denied    Cognitive:  Education:  Patient reported she attended college but did not graduate. Patient reports he joined the [employer] and was active from [id number].  Reading Level: college  Reports difficulty with: N/A  Denies difficulty with: N/A    Infusion Service: patient  "utilizing? no  Home Health: patient utilizing? no  DME:  patient utilizing? no    Living Will: no  Healthcare Power of : no  Written LW/HCPA and verbal information presented to patient today.    Insurance: Payor: [employer] SHIELD / Plan: BCBS ALL OUT OF STATE / Product Type: PPO /   Possible concerns regarding insurance post-donation reviewed. Patient verbalizes clear understanding.    Financial:  Employment: Patient is currently employed. Patient does plan to return to work once medically cleared. Patient referred to vocational rehabilitation.  Spouse/Significant Other Employment: occupation: , company: [employer], time at employer: 15 yrs.  Patient states does not expect to have any financial problems following transplant surgery.  Patient states has not conducted fundraising to assist with post-transplant costs.    Tobacco/Alcohol/Illicit Drug Abuse: Patient reports does not use tobacco products, alcohol, illicit drugs, and non-prescription medications and that patient does plan to remain abstinent.      discussed previous pre-op appointment with patient and caregiver (wife). Patient denied ever having "a drinking problem". Patient reports that he was previously drinking 3 to 4 beers, 3 to 4 times per week. Patient reported he has always been honest about his drinking and voiced frustration that this caused his surgery to be delayed. Patient and caregiver report that patient has stopped drinking.     Psychiatric History:  Patient reports history of anxiety, and confirmed he takes Klonopin 1 mg. Patient reported that he is nervous about his upcoming surgery. Patient denied any current or past thoughts of suicide or homicide. Patient denied needing or wanting any mental health referrals at this time     Coping: Patient states that he is coping well with having kidney living donor surgery. Patient states will call as needed and does understand how to access " resources including the  as needed, both inpatient and outpatient.    Resources, information and support provided. Psychosocial aspects regarding organ donation and transplantation were discussed. Patient reports having a clear understanding of resources, information, support and psychosocial aspects.    Discharge Plan: Patient to discharge to the [facility] under the care of patient's wife post-organ transplant. Patient states that patient's wife will be present as caregiver in the hospital. Patient's wife will transport patient home. Patient states agreement with not driving and not returning to work until medically cleared to do so.    Patient states having clear understanding and realistic expectations regarding the potential risks and potential benefits of organ transplantation and organ donation. Patient agrees to further discuss with health care team members and support system members, as well as to utilize available resources and express questions and/or concerns. Resources and information provided and reviewed.    Patient reports motivation to proceed with living organ donor transplantation as scheduled.     Suitability for Transplant: Patient presents as a suitable candidate for organ transplant at this time.  Patient states does have a caregiver plan, transportation plan, and lodging plan in place. Patient states that patient does have medical and prescription medicine insurance in place and does have a plan in place to afford post-transplant costs.    Patient provided verbal permission to release any necessary information to outside resources for patient care and discharge planning.  Resources and information provided and reviewed.  Patient is choosing has no specific agency preferences.    [email or web address]      Patient states that he is aware of what patient's normal copays and deductibles are for prescription medicines.       provided psychosocial  support, counseling, resources, education, assistance, and discharge planning.  remains available.    Recommendations/Additional Comments: Patient reports he has lodging benefits and has booked a kitchenette room at the [facility] for his lodging post surgery.  recommends patient remain abstinent from etoh, tobacco, and other drugs.  recommends patient remain aware of potential mental health concerns and contact transplant social work as needed. Patient and caregiver agree to contact transplant team with needs, questions, or concerns as they arise.     Patient states is aware of [facility]'s affiliation and/or partnership with agencies in home health care, LTAC, SNF, [facility], and other hospitals and clinics.

## 2023-09-26 NOTE — LETTER
September 26, 2023        Jannet Farias  [address] [place] [address]  Phone: [phone]  Fax: [phone]             [facility]- Transplant 1st Fl  [address]  [facility] [id number]  Phone: [phone]   Patient: Heath Hernandez   MR Number: [id number]   YOB: 1960   Date of Visit: 9/26/2023       Dear Dr. Jannet Farias    Thank you for referring Heath Hernandez to me for evaluation. Attached you will find relevant portions of my assessment and plan of care.    If you have questions, please do not hesitate to call me. I look forward to following Heath Hernandez along with you.    Sincerely,    Colette Zhou, NP    Enclosure    If you would like to receive this communication electronically, please contact [email or web address] or [phone] to request [employer] access.    [employer] is a tool which provides read-only access to select patient information with whom you have a relationship. Its easy to use and provides real time access to review your patients record including encounter summaries, notes, results, and demographic information.    If you feel you have received this communication in error or would no longer like to receive these types of communications, please e-mail [email or web address]

## 2023-09-27 ENCOUNTER — TELEPHONE (OUTPATIENT)
Dept: TRANSPLANT | Facility: CLINIC | Age: 63
End: 2023-09-27
Payer: COMMERCIAL

## 2023-09-27 NOTE — TELEPHONE ENCOUNTER
Returned patient's call from this am.  Patient reviewing his records in [facility].  Noted abnormal EKG from yesterday's pre-op appt.  Concerned this may cancel upcoming surgery.  Reviewed yesterday's EKG and previous cardiac testing with Dr. Evans.  Patient OK to proceed to surgery from her perspective.  The above reported to patient who expressed relief and thanks for update.

## 2023-10-05 ENCOUNTER — TELEPHONE (OUTPATIENT)
Dept: TRANSPLANT | Facility: CLINIC | Age: 63
End: 2023-10-05
Payer: COMMERCIAL

## 2023-10-05 NOTE — TELEPHONE ENCOUNTER
"Donor/Recipient Compatibility    Test/Item Donor Recipient Acceptable/Not Acceptable   ABO O POS O Reviewed   HBsAb (Hepatitis B Surface Antibody) <3.00, Non-reactive Hep. B Surf Ab, Qual (no units)   Date Value   10/06/2021 Negative     Hep. B Surf Ab, Quant. (mIU/mL)   Date Value   10/06/2021 <3    Reviewed   HBsAg (Hepatitis B Surface Antigen) Non-reactive Hepatitis B Surface Ag (no units)   Date Value   09/26/2023 Non-reactive    Reviewed   HBcAb (Hepatitis Core Antibody) Non-reactive Hep B Core Total Ab (no units)   Date Value   09/26/2023 Non-reactive    Reviewed   HCVab (Hepatitis C antibody) Non-reactive Hepatitis C Ab (no units)   Date Value   10/06/2021 Negative    Reviewed   HIV  Non-reactive HIV 1/2 Ag/Ab (no units)   Date Value   09/26/2023 Non-reactive    Reviewed   CMV Reactive CMV IgG Interpretation (no units)   Date Value   10/06/2021 Reactive (A)     If no results found, check Results Review for Sendouts Reviewed   RPR Non-reactive RPR (no units)   Date Value   10/06/2021 Non-reactive    Reviewed     Potassium 4.3 Potassium (mmol/L)   Date Value   09/26/2023 4.9    Reviewed   Creatinine 0.8 Creatinine (mg/dL)   Date Value   09/26/2023 15.9 (H)    Reviewed   eGFR  >60.0 eGFR (mL/min/1.73 m^2)   Date Value   09/26/2023 3.1 (A)    Reviewed   Creatinine Clearance 106 No results found for: "CRCLEARANCE" Reviewed   BUN 10 BUN (mg/dL)   Date Value   09/26/2023 99 (H)    Reviewed but stress to patient the need to PD nightly until the surgery   WBC 6.35 WBC (K/uL)   Date Value   09/26/2023 7.51    Reviewed   Hemoglobin 14.8 Hemoglobin (g/dL)   Date Value   09/26/2023 11.9 (L)    Reviewed   Hematocrit 43.9 Hematocrit (%)   Date Value   09/26/2023 36.6 (L)    Reviewed   Platelet count 311 Platelets (K/uL)   Date Value   09/26/2023 209    Reviewed   COVID Vax status Unvaccinated Vaccinated      Recipient Only    X Match reviewed     Donor Only    Imaging Defer to transplant surgery   IKE Testing Reviewed  Look " in the Labs tab of Chart Review to find the results or Results Review activity.     America Evans,   Transplant Nephrology            Recipient Information  Name: Heath Hernandez  MRN: [id number]  Age: 62 y.o. BMI: 26       Primary Surgeon:Emerson       Dialysis status: peritoneal dialysis  GFR:  K+ at pre-op:   Lab Results   Component Value Date    K 4.9 09/26/2023     Re-Transplant: No  Currently on Blood thinners? No Reason:     Induction: Thymoglobulin    Other Considerations: none    Donor Information  Name: Arlene Mcclelland  MRN: [id number]  Age: 35    Cr Cl: 106  BMI: 30.18    Surgeon: Lang BHAT ID: [id number]  Kidney to be donated: the left side          Final CXM Results:  HLA Flow Crossmatch (Allo) Interpretation (no units)   Date Value   09/26/2023     Final Crossmatch  No DSA detected  Results reported to Margret Frank @ 9:45am on 10/5/23. VRB obtained.       T MCS Average - XM Allo (no units)   Date Value   09/26/2023 6.00   09/26/2023 0.20     T Cell Results - XM Allo (no units)   Date Value   09/26/2023 Negative   09/26/2023 Negative     B MCS Average - XM Allo (no units)   Date Value   09/26/2023 5.30   09/26/2023 -13.70     B Cell Results - XM Allo (no units)   Date Value   09/26/2023 Negative   09/26/2023 Negative        T MCS Average - XM Auto (no units)   Date Value   09/26/2023 -22.90     T Cell Results - XM Auto (no units)   Date Value   09/26/2023 Negative     B MCS Average - XM Auto (no units)   Date Value   09/26/2023 5.90     B Cell Results - XM Auto (no units)   Date Value   09/26/2023 Negative       Comments:  PD cultures negative, scanned into media.  Peth test scanned into media as well.

## 2023-10-05 NOTE — TELEPHONE ENCOUNTER
Recipient Information  Name: Heath Hernandez  MRN: [id number]  Age: 62 y.o. BMI: 26       Primary Surgeon:Emerson       Dialysis status: peritoneal dialysis  GFR:  K+ at pre-op:   Lab Results   Component Value Date    K 4.9 09/26/2023     Re-Transplant: No  Currently on Blood thinners? No Reason:     Induction: Thymoglobulin    Other Considerations: none    Donor Information  Name: Arlene Mcclelland  MRN: [id number]  Age: 35    Cr Cl: 106  BMI: 30.18    Surgeon: Lang BHAT ID: [id number]  Kidney to be donated: the left side          Final CXM Results:  HLA Flow Crossmatch (Allo) Interpretation (no units)   Date Value   09/26/2023     Final Crossmatch  No DSA detected  Results reported to Margret Frank @ 9:45am on 10/5/23. VRB obtained.       T MCS Average - XM Allo (no units)   Date Value   09/26/2023 6.00   09/26/2023 0.20     T Cell Results - XM Allo (no units)   Date Value   09/26/2023 Negative   09/26/2023 Negative     B MCS Average - XM Allo (no units)   Date Value   09/26/2023 5.30   09/26/2023 -13.70     B Cell Results - XM Allo (no units)   Date Value   09/26/2023 Negative   09/26/2023 Negative        T MCS Average - XM Auto (no units)   Date Value   09/26/2023 -22.90     T Cell Results - XM Auto (no units)   Date Value   09/26/2023 Negative     B MCS Average - XM Auto (no units)   Date Value   09/26/2023 5.90     B Cell Results - XM Auto (no units)   Date Value   09/26/2023 Negative       Comments:  PD cultures negative, scanned into media.  Peth test scanned into media as well.

## 2023-10-05 NOTE — TELEPHONE ENCOUNTER
"Donor/Recipient Compatibility    Test/Item Donor Recipient Acceptable/Not Acceptable   ABO O POS O Acceptable   HBsAb (Hepatitis B Surface Antibody) <3.00, Non-reactive Hep. B Surf Ab, Qual (no units)   Date Value   10/06/2021 Negative     Hep. B Surf Ab, Quant. (mIU/mL)   Date Value   10/06/2021 <3    Acceptable   HBsAg (Hepatitis B Surface Antigen) Non-reactive Hepatitis B Surface Ag (no units)   Date Value   09/26/2023 Non-reactive    Acceptable   HBcAb (Hepatitis Core Antibody) Non-reactive Hep B Core Total Ab (no units)   Date Value   09/26/2023 Non-reactive    Acceptable   HCVab (Hepatitis C antibody) Non-reactive Hepatitis C Ab (no units)   Date Value   10/06/2021 Negative    Acceptable   HIV  Non-reactive HIV 1/2 Ag/Ab (no units)   Date Value   09/26/2023 Non-reactive    Acceptable   CMV Reactive CMV IgG Interpretation (no units)   Date Value   10/06/2021 Reactive (A)     If no results found, check Results Review for Sendouts Acceptable   RPR Non-reactive RPR (no units)   Date Value   10/06/2021 Non-reactive    Acceptable     Potassium 4.3 Potassium (mmol/L)   Date Value   09/26/2023 4.9    Acceptable   Creatinine 0.8 Creatinine (mg/dL)   Date Value   09/26/2023 15.9 (H)    Acceptable   eGFR  >60.0 eGFR (mL/min/1.73 m^2)   Date Value   09/26/2023 3.1 (A)    Acceptable   Creatinine Clearance 106 No results found for: "CRCLEARANCE" Acceptable   BUN 10 BUN (mg/dL)   Date Value   09/26/2023 99 (H)    Acceptable   WBC 6.35 WBC (K/uL)   Date Value   09/26/2023 7.51    Acceptable   Hemoglobin 14.8 Hemoglobin (g/dL)   Date Value   09/26/2023 11.9 (L)    Acceptable   Hematocrit 43.9 Hematocrit (%)   Date Value   09/26/2023 36.6 (L)    Acceptable   Platelet count 311 Platelets (K/uL)   Date Value   09/26/2023 209    Acceptable   COVID Vax status Unvaccinated Vaccinated      Recipient Only    X Match reviewed     Donor Only    Imaging reviewed imaging and agree with selection coordinator for left kidney   IKE Testing " Acceptable  Look in the Labs tab of Chart Review to find the results or Results Review activity.     Jamilah Singh MD

## 2023-10-08 ENCOUNTER — TELEPHONE (OUTPATIENT)
Dept: TRANSPLANT | Facility: CLINIC | Age: 63
End: 2023-10-08
Payer: COMMERCIAL

## 2023-10-09 ENCOUNTER — ANESTHESIA (OUTPATIENT)
Dept: SURGERY | Facility: HOSPITAL | Age: 63
DRG: 652 | End: 2023-10-09
Payer: COMMERCIAL

## 2023-10-09 NOTE — TELEPHONE ENCOUNTER
Called and left 2 detailed VM to cancel pt sx in the morning 2/2  kidney transplants needing to go to the OR.

## 2023-10-12 ENCOUNTER — HOSPITAL ENCOUNTER (INPATIENT)
Facility: HOSPITAL | Age: 63
LOS: 5 days | Discharge: HOME | DRG: 652 | End: 2023-10-17
Attending: TRANSPLANT SURGERY | Admitting: TRANSPLANT SURGERY
Payer: COMMERCIAL

## 2023-10-12 ENCOUNTER — ANESTHESIA EVENT (OUTPATIENT)
Dept: SURGERY | Facility: HOSPITAL | Age: 63
DRG: 652 | End: 2023-10-12
Payer: COMMERCIAL

## 2023-10-12 DIAGNOSIS — E83.39 HYPOPHOSPHATEMIA: ICD-10-CM

## 2023-10-12 DIAGNOSIS — D62 ACUTE BLOOD LOSS ANEMIA: ICD-10-CM

## 2023-10-12 DIAGNOSIS — E11.22 TYPE 2 DIABETES MELLITUS WITH CHRONIC KIDNEY DISEASE ON CHRONIC DIALYSIS, WITH LONG-TERM CURRENT USE OF INSULIN: ICD-10-CM

## 2023-10-12 DIAGNOSIS — Z79.4 TYPE 2 DIABETES MELLITUS WITH CHRONIC KIDNEY DISEASE ON CHRONIC DIALYSIS, WITH LONG-TERM CURRENT USE OF INSULIN: ICD-10-CM

## 2023-10-12 DIAGNOSIS — Z76.82 ORGAN TRANSPLANT CANDIDATE: ICD-10-CM

## 2023-10-12 DIAGNOSIS — Z99.2 TYPE 2 DIABETES MELLITUS WITH CHRONIC KIDNEY DISEASE ON CHRONIC DIALYSIS, WITH LONG-TERM CURRENT USE OF INSULIN: ICD-10-CM

## 2023-10-12 DIAGNOSIS — Z29.89 PROPHYLACTIC IMMUNOTHERAPY: ICD-10-CM

## 2023-10-12 DIAGNOSIS — Z79.60 LONG-TERM USE OF IMMUNOSUPPRESSANT MEDICATION: ICD-10-CM

## 2023-10-12 DIAGNOSIS — N18.6 ESRD (END STAGE RENAL DISEASE): ICD-10-CM

## 2023-10-12 DIAGNOSIS — N18.6 ESRD (END STAGE RENAL DISEASE) ON DIALYSIS: ICD-10-CM

## 2023-10-12 DIAGNOSIS — N18.6 TYPE 2 DIABETES MELLITUS WITH CHRONIC KIDNEY DISEASE ON CHRONIC DIALYSIS, WITH LONG-TERM CURRENT USE OF INSULIN: ICD-10-CM

## 2023-10-12 DIAGNOSIS — Z99.2 ESRD (END STAGE RENAL DISEASE) ON DIALYSIS: ICD-10-CM

## 2023-10-12 DIAGNOSIS — D63.8 ANEMIA, CHRONIC DISEASE: ICD-10-CM

## 2023-10-12 DIAGNOSIS — Z94.0 S/P KIDNEY TRANSPLANT: Primary | ICD-10-CM

## 2023-10-12 DIAGNOSIS — I10 BENIGN HYPERTENSION: ICD-10-CM

## 2023-10-12 DIAGNOSIS — Z91.89 AT RISK FOR OPPORTUNISTIC INFECTIONS: ICD-10-CM

## 2023-10-12 LAB
25(OH)D3+25(OH)D2 SERPL-MCNC: 27 NG/ML (ref 30–96)
ABO + RH BLD: NORMAL
ALBUMIN SERPL BCP-MCNC: 2.6 G/DL (ref 3.5–5.2)
ALBUMIN SERPL BCP-MCNC: 2.9 G/DL (ref 3.5–5.2)
ALBUMIN SERPL BCP-MCNC: 3.3 G/DL (ref 3.5–5.2)
ALBUMIN SERPL BCP-MCNC: 3.6 G/DL (ref 3.5–5.2)
ALBUMIN SERPL BCP-MCNC: 3.6 G/DL (ref 3.5–5.2)
ANION GAP SERPL CALC-SCNC: 11 MMOL/L (ref 8–16)
ANION GAP SERPL CALC-SCNC: 12 MMOL/L (ref 8–16)
ANION GAP SERPL CALC-SCNC: 12 MMOL/L (ref 8–16)
ANION GAP SERPL CALC-SCNC: 16 MMOL/L (ref 8–16)
ANION GAP SERPL CALC-SCNC: 16 MMOL/L (ref 8–16)
APTT PPP: 25.1 SEC (ref 21–32)
BASOPHILS # BLD AUTO: 0 K/UL (ref 0–0.2)
BASOPHILS NFR BLD: 0 % (ref 0–1.9)
BLD GP AB SCN CELLS X3 SERPL QL: NORMAL
BUN SERPL-MCNC: 87 MG/DL (ref 8–23)
BUN SERPL-MCNC: 91 MG/DL (ref 8–23)
BUN SERPL-MCNC: 91 MG/DL (ref 8–23)
BUN SERPL-MCNC: 95 MG/DL (ref 8–23)
BUN SERPL-MCNC: 95 MG/DL (ref 8–23)
CALCIUM SERPL-MCNC: 7.2 MG/DL (ref 8.7–10.5)
CALCIUM SERPL-MCNC: 7.7 MG/DL (ref 8.7–10.5)
CALCIUM SERPL-MCNC: 8.1 MG/DL (ref 8.7–10.5)
CALCIUM SERPL-MCNC: 8.9 MG/DL (ref 8.7–10.5)
CALCIUM SERPL-MCNC: 8.9 MG/DL (ref 8.7–10.5)
CHLORIDE SERPL-SCNC: 107 MMOL/L (ref 95–110)
CHLORIDE SERPL-SCNC: 110 MMOL/L (ref 95–110)
CHLORIDE SERPL-SCNC: 111 MMOL/L (ref 95–110)
CHLORIDE SERPL-SCNC: 111 MMOL/L (ref 95–110)
CHLORIDE SERPL-SCNC: 113 MMOL/L (ref 95–110)
CO2 SERPL-SCNC: 15 MMOL/L (ref 23–29)
CO2 SERPL-SCNC: 16 MMOL/L (ref 23–29)
CO2 SERPL-SCNC: 17 MMOL/L (ref 23–29)
CREAT SERPL-MCNC: 13.4 MG/DL (ref 0.5–1.4)
CREAT SERPL-MCNC: 14 MG/DL (ref 0.5–1.4)
CREAT SERPL-MCNC: 14 MG/DL (ref 0.5–1.4)
CREAT SERPL-MCNC: 17.4 MG/DL (ref 0.5–1.4)
CREAT SERPL-MCNC: 17.4 MG/DL (ref 0.5–1.4)
CREAT UR-MCNC: 90 MG/DL (ref 23–375)
CREAT UR-MCNC: 90 MG/DL (ref 23–375)
DIFFERENTIAL METHOD: ABNORMAL
EOSINOPHIL # BLD AUTO: 0 K/UL (ref 0–0.5)
EOSINOPHIL NFR BLD: 0.1 % (ref 0–8)
ERYTHROCYTE [DISTWIDTH] IN BLOOD BY AUTOMATED COUNT: 13.8 % (ref 11.5–14.5)
EST. GFR  (NO RACE VARIABLE): 2.8 ML/MIN/1.73 M^2
EST. GFR  (NO RACE VARIABLE): 2.8 ML/MIN/1.73 M^2
EST. GFR  (NO RACE VARIABLE): 3.6 ML/MIN/1.73 M^2
EST. GFR  (NO RACE VARIABLE): 3.6 ML/MIN/1.73 M^2
EST. GFR  (NO RACE VARIABLE): 3.8 ML/MIN/1.73 M^2
ESTIMATED AVG GLUCOSE: 100 MG/DL (ref 68–131)
GLUCOSE SERPL-MCNC: 122 MG/DL (ref 70–110)
GLUCOSE SERPL-MCNC: 171 MG/DL (ref 70–110)
GLUCOSE SERPL-MCNC: 174 MG/DL (ref 70–110)
GLUCOSE SERPL-MCNC: 76 MG/DL (ref 70–110)
GLUCOSE SERPL-MCNC: 76 MG/DL (ref 70–110)
GLUCOSE SERPL-MCNC: 81 MG/DL (ref 70–110)
HBA1C MFR BLD: 5.1 % (ref 4–5.6)
HBV CORE AB SERPL QL IA: NORMAL
HBV CORE AB SERPL QL IA: NORMAL
HBV SURFACE AB SER-ACNC: <3 MIU/ML
HBV SURFACE AB SER-ACNC: <3 MIU/ML
HBV SURFACE AB SER-ACNC: NORMAL M[IU]/ML
HBV SURFACE AB SER-ACNC: NORMAL M[IU]/ML
HBV SURFACE AG SERPL QL IA: NORMAL
HBV SURFACE AG SERPL QL IA: NORMAL
HCO3 UR-SCNC: 17.9 MMOL/L (ref 24–28)
HCT VFR BLD AUTO: 27.6 % (ref 40–54)
HCT VFR BLD AUTO: 30.5 % (ref 40–54)
HCT VFR BLD AUTO: 34.6 % (ref 40–54)
HCT VFR BLD AUTO: 35 % (ref 40–54)
HCT VFR BLD CALC: 34 %PCV (ref 36–54)
HCV AB SERPL QL IA: NORMAL
HCV AB SERPL QL IA: NORMAL
HCV RNA SERPL QL NAA+PROBE: NOT DETECTED
HCV RNA SERPL QL NAA+PROBE: NOT DETECTED
HCV RNA SPEC NAA+PROBE-ACNC: NOT DETECTED IU/ML
HCV RNA SPEC NAA+PROBE-ACNC: NOT DETECTED IU/ML
HGB BLD-MCNC: 9 G/DL (ref 14–18)
HIV 1+2 AB+HIV1 P24 AG SERPL QL IA: NORMAL
HIV 1+2 AB+HIV1 P24 AG SERPL QL IA: NORMAL
IMM GRANULOCYTES # BLD AUTO: 0.06 K/UL (ref 0–0.04)
IMM GRANULOCYTES NFR BLD AUTO: 0.6 % (ref 0–0.5)
INR PPP: 1 (ref 0.8–1.2)
LYMPHOCYTES # BLD AUTO: 0 K/UL (ref 1–4.8)
LYMPHOCYTES NFR BLD: 0.2 % (ref 18–48)
MCH RBC QN AUTO: 29.8 PG (ref 27–31)
MCHC RBC AUTO-ENTMCNC: 32.6 G/DL (ref 32–36)
MCV RBC AUTO: 91 FL (ref 82–98)
MONOCYTES # BLD AUTO: 0.2 K/UL (ref 0.3–1)
MONOCYTES NFR BLD: 1.6 % (ref 4–15)
NEUTROPHILS # BLD AUTO: 9.9 K/UL (ref 1.8–7.7)
NEUTROPHILS NFR BLD: 97.5 % (ref 38–73)
NRBC BLD-RTO: 0 /100 WBC
PCO2 BLDA: 34.8 MMHG (ref 35–45)
PH SMN: 7.32 [PH] (ref 7.35–7.45)
PHOSPHATE SERPL-MCNC: 4.6 MG/DL (ref 2.7–4.5)
PHOSPHATE SERPL-MCNC: 5.7 MG/DL (ref 2.7–4.5)
PHOSPHATE SERPL-MCNC: 6.4 MG/DL (ref 2.7–4.5)
PHOSPHATE SERPL-MCNC: 7.2 MG/DL (ref 2.7–4.5)
PHOSPHATE SERPL-MCNC: 7.2 MG/DL (ref 2.7–4.5)
PLATELET # BLD AUTO: 180 K/UL (ref 150–450)
PMV BLD AUTO: 10 FL (ref 9.2–12.9)
PO2 BLDA: 34 MMHG (ref 40–60)
POC BE: -8 MMOL/L
POC IONIZED CALCIUM: 1.19 MMOL/L (ref 1.06–1.42)
POC SATURATED O2: 61 % (ref 95–100)
POC TCO2: 19 MMOL/L (ref 24–29)
POCT GLUCOSE: 121 MG/DL (ref 70–110)
POCT GLUCOSE: 169 MG/DL (ref 70–110)
POCT GLUCOSE: 186 MG/DL (ref 70–110)
POCT GLUCOSE: 214 MG/DL (ref 70–110)
POTASSIUM BLD-SCNC: 4.9 MMOL/L (ref 3.5–5.1)
POTASSIUM SERPL-SCNC: 4.3 MMOL/L (ref 3.5–5.1)
POTASSIUM SERPL-SCNC: 4.4 MMOL/L (ref 3.5–5.1)
POTASSIUM SERPL-SCNC: 4.4 MMOL/L (ref 3.5–5.1)
POTASSIUM SERPL-SCNC: 4.7 MMOL/L (ref 3.5–5.1)
POTASSIUM SERPL-SCNC: 4.7 MMOL/L (ref 3.5–5.1)
POTASSIUM SERPL-SCNC: 5.2 MMOL/L (ref 3.5–5.1)
PROT UR-MCNC: 471 MG/DL (ref 0–15)
PROT UR-MCNC: 471 MG/DL (ref 0–15)
PROT/CREAT UR: 5.23 MG/G{CREAT} (ref 0–0.2)
PROT/CREAT UR: 5.23 MG/G{CREAT} (ref 0–0.2)
PROTHROMBIN TIME: 10.3 SEC (ref 9–12.5)
PTH-INTACT SERPL-MCNC: 311 PG/ML (ref 9–77)
PTH-INTACT SERPL-MCNC: 311 PG/ML (ref 9–77)
RBC # BLD AUTO: 3.02 M/UL (ref 4.6–6.2)
SAMPLE: ABNORMAL
SARS-COV-2 RDRP RESP QL NAA+PROBE: NEGATIVE
SODIUM BLD-SCNC: 142 MMOL/L (ref 136–145)
SODIUM SERPL-SCNC: 136 MMOL/L (ref 136–145)
SODIUM SERPL-SCNC: 138 MMOL/L (ref 136–145)
SODIUM SERPL-SCNC: 139 MMOL/L (ref 136–145)
SODIUM SERPL-SCNC: 144 MMOL/L (ref 136–145)
SODIUM SERPL-SCNC: 144 MMOL/L (ref 136–145)
WBC # BLD AUTO: 10.15 K/UL (ref 3.9–12.7)

## 2023-10-12 PROCEDURE — 37000009 HC ANESTHESIA EA ADD 15 MINS: Performed by: TRANSPLANT SURGERY

## 2023-10-12 PROCEDURE — 87389 HIV-1 AG W/HIV-1&-2 AB AG IA: CPT | Performed by: SURGERY

## 2023-10-12 PROCEDURE — 25000003 PHARM REV CODE 250

## 2023-10-12 PROCEDURE — 25000003 PHARM REV CODE 250: Performed by: NURSE ANESTHETIST, CERTIFIED REGISTERED

## 2023-10-12 PROCEDURE — 25000003 PHARM REV CODE 250: Performed by: NURSE PRACTITIONER

## 2023-10-12 PROCEDURE — 85025 COMPLETE CBC W/AUTO DIFF WBC: CPT | Performed by: NURSE PRACTITIONER

## 2023-10-12 PROCEDURE — 71000039 HC RECOVERY, EACH ADD'L HOUR: Performed by: TRANSPLANT SURGERY

## 2023-10-12 PROCEDURE — 86704 HEP B CORE ANTIBODY TOTAL: CPT | Performed by: SURGERY

## 2023-10-12 PROCEDURE — 82570 ASSAY OF URINE CREATININE: CPT | Performed by: SURGERY

## 2023-10-12 PROCEDURE — 99900035 HC TECH TIME PER 15 MIN (STAT)

## 2023-10-12 PROCEDURE — 50360 RNL ALTRNSPLJ W/O RCP NFRCT: CPT | Mod: LT,,, | Performed by: TRANSPLANT SURGERY

## 2023-10-12 PROCEDURE — 50605 PR URETEROTOMY TO INSERT STENT: ICD-10-PCS | Mod: 51,LT,, | Performed by: TRANSPLANT SURGERY

## 2023-10-12 PROCEDURE — 83970 ASSAY OF PARATHORMONE: CPT | Performed by: SURGERY

## 2023-10-12 PROCEDURE — 82306 VITAMIN D 25 HYDROXY: CPT | Performed by: TRANSPLANT SURGERY

## 2023-10-12 PROCEDURE — 49422 REMOVE TUNNELED IP CATH: CPT | Mod: 51,,, | Performed by: TRANSPLANT SURGERY

## 2023-10-12 PROCEDURE — 94761 N-INVAS EAR/PLS OXIMETRY MLT: CPT

## 2023-10-12 PROCEDURE — 71000016 HC POSTOP RECOV ADDL HR: Performed by: TRANSPLANT SURGERY

## 2023-10-12 PROCEDURE — 99499 NO LOS: ICD-10-PCS | Mod: ,,, | Performed by: NURSE PRACTITIONER

## 2023-10-12 PROCEDURE — 71000015 HC POSTOP RECOV 1ST HR: Performed by: TRANSPLANT SURGERY

## 2023-10-12 PROCEDURE — 50605 INSERT URETERAL SUPPORT: CPT | Mod: 51,LT,, | Performed by: TRANSPLANT SURGERY

## 2023-10-12 PROCEDURE — 85014 HEMATOCRIT: CPT

## 2023-10-12 PROCEDURE — 49422 PR REMOVAL TUNNELED INTRAPERITONEAL CATHETER: ICD-10-PCS | Mod: 51,,, | Performed by: TRANSPLANT SURGERY

## 2023-10-12 PROCEDURE — 85014 HEMATOCRIT: CPT | Mod: 91

## 2023-10-12 PROCEDURE — S5010 5% DEXTROSE AND 0.45% SALINE: HCPCS

## 2023-10-12 PROCEDURE — 27201037 HC PRESSURE MONITORING SET UP: Mod: NTX

## 2023-10-12 PROCEDURE — 63600175 PHARM REV CODE 636 W HCPCS: Performed by: TRANSPLANT SURGERY

## 2023-10-12 PROCEDURE — 63600175 PHARM REV CODE 636 W HCPCS

## 2023-10-12 PROCEDURE — 36620 ARTERIAL: ICD-10-PCS | Mod: 59,,, | Performed by: ANESTHESIOLOGY

## 2023-10-12 PROCEDURE — 36000931 HC OR TIME LEV VII EA ADD 15 MIN: Performed by: TRANSPLANT SURGERY

## 2023-10-12 PROCEDURE — 25000003 PHARM REV CODE 250: Performed by: INTERNAL MEDICINE

## 2023-10-12 PROCEDURE — D9220A PRA ANESTHESIA: Mod: ANES,,, | Performed by: ANESTHESIOLOGY

## 2023-10-12 PROCEDURE — 63600175 PHARM REV CODE 636 W HCPCS: Mod: JZ,JG,NTX | Performed by: SURGERY

## 2023-10-12 PROCEDURE — 25000003 PHARM REV CODE 250: Mod: NTX | Performed by: SURGERY

## 2023-10-12 PROCEDURE — 36620 INSERTION CATHETER ARTERY: CPT | Mod: 59,,, | Performed by: ANESTHESIOLOGY

## 2023-10-12 PROCEDURE — 87340 HEPATITIS B SURFACE AG IA: CPT | Performed by: SURGERY

## 2023-10-12 PROCEDURE — 63600175 PHARM REV CODE 636 W HCPCS: Performed by: NURSE ANESTHETIST, CERTIFIED REGISTERED

## 2023-10-12 PROCEDURE — D9220A PRA ANESTHESIA: ICD-10-PCS | Mod: CRNA,,, | Performed by: NURSE ANESTHETIST, CERTIFIED REGISTERED

## 2023-10-12 PROCEDURE — 86850 RBC ANTIBODY SCREEN: CPT | Performed by: SURGERY

## 2023-10-12 PROCEDURE — 85610 PROTHROMBIN TIME: CPT | Performed by: SURGERY

## 2023-10-12 PROCEDURE — 83036 HEMOGLOBIN GLYCOSYLATED A1C: CPT | Performed by: SURGERY

## 2023-10-12 PROCEDURE — 99499 UNLISTED E&M SERVICE: CPT | Mod: ,,, | Performed by: NURSE PRACTITIONER

## 2023-10-12 PROCEDURE — 86706 HEP B SURFACE ANTIBODY: CPT | Performed by: SURGERY

## 2023-10-12 PROCEDURE — 86920 COMPATIBILITY TEST SPIN: CPT | Performed by: ANESTHESIOLOGY

## 2023-10-12 PROCEDURE — 85730 THROMBOPLASTIN TIME PARTIAL: CPT | Performed by: SURGERY

## 2023-10-12 PROCEDURE — 71000033 HC RECOVERY, INTIAL HOUR: Performed by: TRANSPLANT SURGERY

## 2023-10-12 PROCEDURE — 20600001 HC STEP DOWN PRIVATE ROOM

## 2023-10-12 PROCEDURE — 50360 PR TRANSPLANTATION OF KIDNEY: ICD-10-PCS | Mod: LT,,, | Performed by: TRANSPLANT SURGERY

## 2023-10-12 PROCEDURE — C2617 STENT, NON-COR, TEM W/O DEL: HCPCS | Performed by: TRANSPLANT SURGERY

## 2023-10-12 PROCEDURE — 63600175 PHARM REV CODE 636 W HCPCS: Mod: NTX | Performed by: SURGERY

## 2023-10-12 PROCEDURE — 85014 HEMATOCRIT: CPT | Mod: 91 | Performed by: SURGERY

## 2023-10-12 PROCEDURE — 37000008 HC ANESTHESIA 1ST 15 MINUTES: Performed by: TRANSPLANT SURGERY

## 2023-10-12 PROCEDURE — S5010 5% DEXTROSE AND 0.45% SALINE: HCPCS | Performed by: INTERNAL MEDICINE

## 2023-10-12 PROCEDURE — 36415 COLL VENOUS BLD VENIPUNCTURE: CPT | Performed by: NURSE PRACTITIONER

## 2023-10-12 PROCEDURE — 82962 GLUCOSE BLOOD TEST: CPT | Performed by: TRANSPLANT SURGERY

## 2023-10-12 PROCEDURE — 87522 HEPATITIS C REVRS TRNSCRPJ: CPT | Performed by: SURGERY

## 2023-10-12 PROCEDURE — 50325 PREP DONOR RENAL GRAFT: CPT | Mod: 51,LT,, | Performed by: TRANSPLANT SURGERY

## 2023-10-12 PROCEDURE — D9220A PRA ANESTHESIA: ICD-10-PCS | Mod: ANES,,, | Performed by: ANESTHESIOLOGY

## 2023-10-12 PROCEDURE — 80069 RENAL FUNCTION PANEL: CPT | Mod: 91 | Performed by: SURGERY

## 2023-10-12 PROCEDURE — 80069 RENAL FUNCTION PANEL: CPT | Mod: 91 | Performed by: NURSE PRACTITIONER

## 2023-10-12 PROCEDURE — U0002 COVID-19 LAB TEST NON-CDC: HCPCS | Performed by: SURGERY

## 2023-10-12 PROCEDURE — 80069 RENAL FUNCTION PANEL: CPT | Mod: 91

## 2023-10-12 PROCEDURE — D9220A PRA ANESTHESIA: Mod: CRNA,,, | Performed by: NURSE ANESTHETIST, CERTIFIED REGISTERED

## 2023-10-12 PROCEDURE — 27201423 OPTIME MED/SURG SUP & DEVICES STERILE SUPPLY: Performed by: TRANSPLANT SURGERY

## 2023-10-12 PROCEDURE — 81100000 HC KIDNEY ACQUISITION-LIVE DONOR

## 2023-10-12 PROCEDURE — 36000930 HC OR TIME LEV VII 1ST 15 MIN: Performed by: TRANSPLANT SURGERY

## 2023-10-12 PROCEDURE — 36415 COLL VENOUS BLD VENIPUNCTURE: CPT | Performed by: SURGERY

## 2023-10-12 PROCEDURE — 86803 HEPATITIS C AB TEST: CPT | Performed by: SURGERY

## 2023-10-12 PROCEDURE — 50325 PR TRANSPLANT,PREP LIVING  RENAL GRAFT: ICD-10-PCS | Mod: 51,LT,, | Performed by: TRANSPLANT SURGERY

## 2023-10-12 PROCEDURE — 80069 RENAL FUNCTION PANEL: CPT

## 2023-10-12 DEVICE — IMPLANTABLE DEVICE
Type: IMPLANTABLE DEVICE | Site: URETER | Status: NON-FUNCTIONAL
Removed: 2023-11-08

## 2023-10-12 RX ORDER — SODIUM CHLORIDE 0.9 % (FLUSH) 0.9 %
10 SYRINGE (ML) INJECTION
Status: DISCONTINUED | OUTPATIENT
Start: 2023-10-12 | End: 2023-10-17 | Stop reason: HOSPADM

## 2023-10-12 RX ORDER — MANNITOL 250 MG/ML
INJECTION, SOLUTION INTRAVENOUS
Status: DISCONTINUED | OUTPATIENT
Start: 2023-10-12 | End: 2023-10-12

## 2023-10-12 RX ORDER — ONDANSETRON 2 MG/ML
4 INJECTION INTRAMUSCULAR; INTRAVENOUS EVERY 6 HOURS PRN
Status: DISCONTINUED | OUTPATIENT
Start: 2023-10-12 | End: 2023-10-17 | Stop reason: HOSPADM

## 2023-10-12 RX ORDER — HEPARIN SODIUM 5000 [USP'U]/ML
5000 INJECTION, SOLUTION INTRAVENOUS; SUBCUTANEOUS EVERY 8 HOURS
Status: DISCONTINUED | OUTPATIENT
Start: 2023-10-12 | End: 2023-10-13

## 2023-10-12 RX ORDER — FUROSEMIDE 10 MG/ML
80 INJECTION INTRAMUSCULAR; INTRAVENOUS ONCE
Status: COMPLETED | OUTPATIENT
Start: 2023-10-12 | End: 2023-10-12

## 2023-10-12 RX ORDER — OXYCODONE HYDROCHLORIDE 5 MG/1
5 TABLET ORAL EVERY 6 HOURS PRN
Status: DISCONTINUED | OUTPATIENT
Start: 2023-10-12 | End: 2023-10-17 | Stop reason: HOSPADM

## 2023-10-12 RX ORDER — IBUPROFEN 200 MG
16 TABLET ORAL
Status: DISCONTINUED | OUTPATIENT
Start: 2023-10-12 | End: 2023-10-17 | Stop reason: HOSPADM

## 2023-10-12 RX ORDER — MIDAZOLAM HYDROCHLORIDE 1 MG/ML
INJECTION, SOLUTION INTRAMUSCULAR; INTRAVENOUS
Status: DISCONTINUED | OUTPATIENT
Start: 2023-10-12 | End: 2023-10-12

## 2023-10-12 RX ORDER — DEXTROSE MONOHYDRATE AND SODIUM CHLORIDE 5; .45 G/100ML; G/100ML
INJECTION, SOLUTION INTRAVENOUS CONTINUOUS
Status: DISCONTINUED | OUTPATIENT
Start: 2023-10-12 | End: 2023-10-12

## 2023-10-12 RX ORDER — MUPIROCIN 20 MG/G
1 OINTMENT TOPICAL 2 TIMES DAILY
Status: COMPLETED | OUTPATIENT
Start: 2023-10-12 | End: 2023-10-17

## 2023-10-12 RX ORDER — METHYLPREDNISOLONE SOD SUCC 125 MG
250 VIAL (EA) INJECTION ONCE
Status: COMPLETED | OUTPATIENT
Start: 2023-10-13 | End: 2023-10-13

## 2023-10-12 RX ORDER — FUROSEMIDE 10 MG/ML
INJECTION INTRAMUSCULAR; INTRAVENOUS
Status: DISCONTINUED | OUTPATIENT
Start: 2023-10-12 | End: 2023-10-12

## 2023-10-12 RX ORDER — ACETAMINOPHEN 325 MG/1
650 TABLET ORAL EVERY 8 HOURS
Status: DISPENSED | OUTPATIENT
Start: 2023-10-12 | End: 2023-10-14

## 2023-10-12 RX ORDER — PHENYLEPHRINE HCL IN 0.9% NACL 1 MG/10 ML
SYRINGE (ML) INTRAVENOUS
Status: DISCONTINUED | OUTPATIENT
Start: 2023-10-12 | End: 2023-10-12

## 2023-10-12 RX ORDER — HYDRALAZINE HYDROCHLORIDE 20 MG/ML
10 INJECTION INTRAMUSCULAR; INTRAVENOUS EVERY 6 HOURS PRN
Status: DISCONTINUED | OUTPATIENT
Start: 2023-10-12 | End: 2023-10-17 | Stop reason: HOSPADM

## 2023-10-12 RX ORDER — DIPHENHYDRAMINE HYDROCHLORIDE 50 MG/ML
INJECTION INTRAMUSCULAR; INTRAVENOUS
Status: DISCONTINUED | OUTPATIENT
Start: 2023-10-12 | End: 2023-10-12

## 2023-10-12 RX ORDER — HYDROMORPHONE HYDROCHLORIDE 1 MG/ML
INJECTION, SOLUTION INTRAMUSCULAR; INTRAVENOUS; SUBCUTANEOUS
Status: DISCONTINUED | OUTPATIENT
Start: 2023-10-12 | End: 2023-10-12

## 2023-10-12 RX ORDER — HEPARIN SODIUM 10000 [USP'U]/ML
INJECTION, SOLUTION INTRAVENOUS; SUBCUTANEOUS
Status: DISCONTINUED | OUTPATIENT
Start: 2023-10-12 | End: 2023-10-12

## 2023-10-12 RX ORDER — SODIUM CHLORIDE 0.9 % (FLUSH) 0.9 %
10 SYRINGE (ML) INJECTION
Status: DISCONTINUED | OUTPATIENT
Start: 2023-10-12 | End: 2023-10-16

## 2023-10-12 RX ORDER — ACETAMINOPHEN 10 MG/ML
INJECTION, SOLUTION INTRAVENOUS
Status: DISCONTINUED | OUTPATIENT
Start: 2023-10-12 | End: 2023-10-12

## 2023-10-12 RX ORDER — METHYLPREDNISOLONE SOD SUCC 125 MG
125 VIAL (EA) INJECTION ONCE
Status: COMPLETED | OUTPATIENT
Start: 2023-10-14 | End: 2023-10-14

## 2023-10-12 RX ORDER — MUPIROCIN 20 MG/G
OINTMENT TOPICAL
Status: DISCONTINUED | OUTPATIENT
Start: 2023-10-12 | End: 2023-10-12 | Stop reason: HOSPADM

## 2023-10-12 RX ORDER — ACETAMINOPHEN 325 MG/1
650 TABLET ORAL
Status: DISPENSED | OUTPATIENT
Start: 2023-10-13 | End: 2023-10-15

## 2023-10-12 RX ORDER — HYDROMORPHONE HYDROCHLORIDE 1 MG/ML
0.2 INJECTION, SOLUTION INTRAMUSCULAR; INTRAVENOUS; SUBCUTANEOUS EVERY 5 MIN PRN
Status: DISCONTINUED | OUTPATIENT
Start: 2023-10-12 | End: 2023-10-13

## 2023-10-12 RX ORDER — SODIUM CHLORIDE 9 MG/ML
INJECTION, SOLUTION INTRAVENOUS CONTINUOUS
Status: DISCONTINUED | OUTPATIENT
Start: 2023-10-12 | End: 2023-10-13

## 2023-10-12 RX ORDER — IBUPROFEN 200 MG
24 TABLET ORAL
Status: DISCONTINUED | OUTPATIENT
Start: 2023-10-12 | End: 2023-10-17 | Stop reason: HOSPADM

## 2023-10-12 RX ORDER — DEXAMETHASONE SODIUM PHOSPHATE 4 MG/ML
INJECTION, SOLUTION INTRA-ARTICULAR; INTRALESIONAL; INTRAMUSCULAR; INTRAVENOUS; SOFT TISSUE
Status: DISCONTINUED | OUTPATIENT
Start: 2023-10-12 | End: 2023-10-12

## 2023-10-12 RX ORDER — FENTANYL CITRATE 50 UG/ML
INJECTION, SOLUTION INTRAMUSCULAR; INTRAVENOUS
Status: DISCONTINUED | OUTPATIENT
Start: 2023-10-12 | End: 2023-10-12

## 2023-10-12 RX ORDER — DIPHENHYDRAMINE HCL 25 MG
25 CAPSULE ORAL ONCE
Status: DISCONTINUED | OUTPATIENT
Start: 2023-10-12 | End: 2023-10-12

## 2023-10-12 RX ORDER — PREGABALIN 75 MG/1
75 CAPSULE ORAL
Status: COMPLETED | OUTPATIENT
Start: 2023-10-12 | End: 2023-10-12

## 2023-10-12 RX ORDER — LIDOCAINE HYDROCHLORIDE 20 MG/ML
INJECTION, SOLUTION EPIDURAL; INFILTRATION; INTRACAUDAL; PERINEURAL
Status: DISCONTINUED | OUTPATIENT
Start: 2023-10-12 | End: 2023-10-12

## 2023-10-12 RX ORDER — SODIUM BICARBONATE 650 MG/1
1300 TABLET ORAL ONCE
Status: CANCELLED | OUTPATIENT
Start: 2023-10-12 | End: 2023-10-12

## 2023-10-12 RX ORDER — EPINEPHRINE 1 MG/ML
1 INJECTION, SOLUTION, CONCENTRATE INTRAVENOUS ONCE AS NEEDED
Status: DISCONTINUED | OUTPATIENT
Start: 2023-10-12 | End: 2023-10-17 | Stop reason: HOSPADM

## 2023-10-12 RX ORDER — SODIUM CHLORIDE 0.9 % (FLUSH) 0.9 %
10 SYRINGE (ML) INJECTION
Status: DISCONTINUED | OUTPATIENT
Start: 2023-10-12 | End: 2023-10-12 | Stop reason: HOSPADM

## 2023-10-12 RX ORDER — HEPARIN SODIUM 5000 [USP'U]/ML
5000 INJECTION, SOLUTION INTRAVENOUS; SUBCUTANEOUS ONCE
Status: COMPLETED | OUTPATIENT
Start: 2023-10-12 | End: 2023-10-12

## 2023-10-12 RX ORDER — LABETALOL HCL 20 MG/4 ML
10 SYRINGE (ML) INTRAVENOUS EVERY 6 HOURS PRN
Status: DISCONTINUED | OUTPATIENT
Start: 2023-10-12 | End: 2023-10-17 | Stop reason: HOSPADM

## 2023-10-12 RX ORDER — TRAMADOL HYDROCHLORIDE 50 MG/1
50 TABLET ORAL EVERY 6 HOURS PRN
Status: DISCONTINUED | OUTPATIENT
Start: 2023-10-12 | End: 2023-10-17 | Stop reason: HOSPADM

## 2023-10-12 RX ORDER — SULFAMETHOXAZOLE AND TRIMETHOPRIM 400; 80 MG/1; MG/1
1 TABLET ORAL EVERY MORNING
Status: DISCONTINUED | OUTPATIENT
Start: 2023-10-22 | End: 2023-10-17 | Stop reason: HOSPADM

## 2023-10-12 RX ORDER — DIPHENHYDRAMINE HCL 25 MG
25 CAPSULE ORAL
Status: COMPLETED | OUTPATIENT
Start: 2023-10-13 | End: 2023-10-14

## 2023-10-12 RX ORDER — DIPHENHYDRAMINE HCL 25 MG
50 CAPSULE ORAL ONCE AS NEEDED
Status: DISCONTINUED | OUTPATIENT
Start: 2023-10-12 | End: 2023-10-17 | Stop reason: HOSPADM

## 2023-10-12 RX ORDER — ACETAMINOPHEN 650 MG/20.3ML
650 LIQUID ORAL ONCE
Status: DISCONTINUED | OUTPATIENT
Start: 2023-10-12 | End: 2023-10-12 | Stop reason: HOSPADM

## 2023-10-12 RX ORDER — MYCOPHENOLATE MOFETIL 250 MG/1
1000 CAPSULE ORAL 2 TIMES DAILY
Status: DISCONTINUED | OUTPATIENT
Start: 2023-10-12 | End: 2023-10-17 | Stop reason: HOSPADM

## 2023-10-12 RX ORDER — HEPARIN SODIUM 5000 [USP'U]/ML
5000 INJECTION, SOLUTION INTRAVENOUS; SUBCUTANEOUS ONCE
Status: DISCONTINUED | OUTPATIENT
Start: 2023-10-12 | End: 2023-10-12

## 2023-10-12 RX ORDER — ATORVASTATIN CALCIUM 10 MG/1
10 TABLET, FILM COATED ORAL DAILY
Status: DISCONTINUED | OUTPATIENT
Start: 2023-10-12 | End: 2023-10-17 | Stop reason: HOSPADM

## 2023-10-12 RX ORDER — CEFAZOLIN SODIUM 1 G/3ML
INJECTION, POWDER, FOR SOLUTION INTRAMUSCULAR; INTRAVENOUS
Status: DISCONTINUED | OUTPATIENT
Start: 2023-10-12 | End: 2023-10-12

## 2023-10-12 RX ORDER — AMLODIPINE BESYLATE 10 MG/1
10 TABLET ORAL DAILY
Status: DISCONTINUED | OUTPATIENT
Start: 2023-10-13 | End: 2023-10-13

## 2023-10-12 RX ORDER — ONDANSETRON 2 MG/ML
INJECTION INTRAMUSCULAR; INTRAVENOUS
Status: DISCONTINUED | OUTPATIENT
Start: 2023-10-12 | End: 2023-10-12

## 2023-10-12 RX ORDER — CEFAZOLIN SODIUM 1 G/3ML
INJECTION, POWDER, FOR SOLUTION INTRAMUSCULAR; INTRAVENOUS
Status: DISCONTINUED | OUTPATIENT
Start: 2023-10-12 | End: 2023-10-12 | Stop reason: HOSPADM

## 2023-10-12 RX ORDER — BISACODYL 5 MG
10 TABLET, DELAYED RELEASE (ENTERIC COATED) ORAL NIGHTLY
Status: DISCONTINUED | OUTPATIENT
Start: 2023-10-12 | End: 2023-10-17 | Stop reason: HOSPADM

## 2023-10-12 RX ORDER — PREDNISONE 20 MG/1
20 TABLET ORAL DAILY
Status: DISCONTINUED | OUTPATIENT
Start: 2023-10-15 | End: 2023-10-17 | Stop reason: HOSPADM

## 2023-10-12 RX ORDER — DOCUSATE SODIUM 100 MG/1
100 CAPSULE, LIQUID FILLED ORAL 3 TIMES DAILY
Status: DISCONTINUED | OUTPATIENT
Start: 2023-10-12 | End: 2023-10-17 | Stop reason: HOSPADM

## 2023-10-12 RX ORDER — DIPHENHYDRAMINE HYDROCHLORIDE 50 MG/ML
50 INJECTION INTRAMUSCULAR; INTRAVENOUS ONCE
Status: DISCONTINUED | OUTPATIENT
Start: 2023-10-12 | End: 2023-10-12

## 2023-10-12 RX ORDER — DEXMEDETOMIDINE HYDROCHLORIDE 100 UG/ML
INJECTION, SOLUTION INTRAVENOUS
Status: DISCONTINUED | OUTPATIENT
Start: 2023-10-12 | End: 2023-10-12

## 2023-10-12 RX ORDER — BUPIVACAINE HYDROCHLORIDE 2.5 MG/ML
INJECTION, SOLUTION EPIDURAL; INFILTRATION; INTRACAUDAL
Status: DISCONTINUED | OUTPATIENT
Start: 2023-10-12 | End: 2023-10-12 | Stop reason: HOSPADM

## 2023-10-12 RX ORDER — CARVEDILOL 25 MG/1
25 TABLET ORAL 2 TIMES DAILY
Status: DISCONTINUED | OUTPATIENT
Start: 2023-10-12 | End: 2023-10-13

## 2023-10-12 RX ORDER — ACETAMINOPHEN 650 MG/20.3ML
650 LIQUID ORAL ONCE
Status: DISCONTINUED | OUTPATIENT
Start: 2023-10-12 | End: 2023-10-12

## 2023-10-12 RX ORDER — HYDROMORPHONE HYDROCHLORIDE 1 MG/ML
0.5 INJECTION, SOLUTION INTRAMUSCULAR; INTRAVENOUS; SUBCUTANEOUS ONCE
Status: DISCONTINUED | OUTPATIENT
Start: 2023-10-12 | End: 2023-10-13

## 2023-10-12 RX ORDER — NEOSTIGMINE METHYLSULFATE 0.5 MG/ML
INJECTION, SOLUTION INTRAVENOUS
Status: DISCONTINUED | OUTPATIENT
Start: 2023-10-12 | End: 2023-10-12

## 2023-10-12 RX ORDER — GLUCAGON 1 MG
1 KIT INJECTION CONTINUOUS PRN
Status: DISCONTINUED | OUTPATIENT
Start: 2023-10-12 | End: 2023-10-16

## 2023-10-12 RX ORDER — DIPHENHYDRAMINE HYDROCHLORIDE 50 MG/ML
50 INJECTION INTRAMUSCULAR; INTRAVENOUS ONCE
Status: DISCONTINUED | OUTPATIENT
Start: 2023-10-12 | End: 2023-10-12 | Stop reason: HOSPADM

## 2023-10-12 RX ORDER — VALGANCICLOVIR 450 MG/1
450 TABLET, FILM COATED ORAL EVERY MORNING
Status: DISCONTINUED | OUTPATIENT
Start: 2023-10-22 | End: 2023-10-17 | Stop reason: HOSPADM

## 2023-10-12 RX ORDER — TACROLIMUS 1 MG/1
3 CAPSULE ORAL 2 TIMES DAILY
Status: DISCONTINUED | OUTPATIENT
Start: 2023-10-12 | End: 2023-10-13

## 2023-10-12 RX ORDER — PROPOFOL 10 MG/ML
VIAL (ML) INTRAVENOUS
Status: DISCONTINUED | OUTPATIENT
Start: 2023-10-12 | End: 2023-10-12

## 2023-10-12 RX ORDER — KETAMINE HCL IN 0.9 % NACL 50 MG/5 ML
SYRINGE (ML) INTRAVENOUS
Status: DISCONTINUED | OUTPATIENT
Start: 2023-10-12 | End: 2023-10-12

## 2023-10-12 RX ORDER — PREGABALIN 75 MG/1
75 CAPSULE ORAL
Status: DISCONTINUED | OUTPATIENT
Start: 2023-10-12 | End: 2023-10-12 | Stop reason: HOSPADM

## 2023-10-12 RX ORDER — INSULIN ASPART 100 [IU]/ML
0-5 INJECTION, SOLUTION INTRAVENOUS; SUBCUTANEOUS
Status: DISCONTINUED | OUTPATIENT
Start: 2023-10-12 | End: 2023-10-13

## 2023-10-12 RX ORDER — LABETALOL HYDROCHLORIDE 5 MG/ML
INJECTION, SOLUTION INTRAVENOUS
Status: DISCONTINUED | OUTPATIENT
Start: 2023-10-12 | End: 2023-10-12

## 2023-10-12 RX ORDER — PROCHLORPERAZINE EDISYLATE 5 MG/ML
5 INJECTION INTRAMUSCULAR; INTRAVENOUS EVERY 30 MIN PRN
Status: DISCONTINUED | OUTPATIENT
Start: 2023-10-12 | End: 2023-10-13

## 2023-10-12 RX ORDER — FAMOTIDINE 20 MG/1
20 TABLET, FILM COATED ORAL NIGHTLY
Status: DISCONTINUED | OUTPATIENT
Start: 2023-10-12 | End: 2023-10-17 | Stop reason: HOSPADM

## 2023-10-12 RX ORDER — CISATRACURIUM BESYLATE 2 MG/ML
INJECTION, SOLUTION INTRAVENOUS
Status: DISCONTINUED | OUTPATIENT
Start: 2023-10-12 | End: 2023-10-12

## 2023-10-12 RX ORDER — ACETAMINOPHEN 325 MG/1
650 TABLET ORAL ONCE
Status: DISCONTINUED | OUTPATIENT
Start: 2023-10-12 | End: 2023-10-12

## 2023-10-12 RX ADMIN — Medication 10 MG: at 01:10

## 2023-10-12 RX ADMIN — DOCUSATE SODIUM 100 MG: 100 CAPSULE, LIQUID FILLED ORAL at 08:10

## 2023-10-12 RX ADMIN — NEOSTIGMINE METHYLSULFATE 5 MG: 0.5 INJECTION INTRAVENOUS at 01:10

## 2023-10-12 RX ADMIN — CARVEDILOL 25 MG: 25 TABLET, FILM COATED ORAL at 08:10

## 2023-10-12 RX ADMIN — PREGABALIN 75 MG: 75 CAPSULE ORAL at 07:10

## 2023-10-12 RX ADMIN — ONDANSETRON 4 MG: 2 INJECTION INTRAMUSCULAR; INTRAVENOUS at 12:10

## 2023-10-12 RX ADMIN — TACROLIMUS 3 MG: 1 CAPSULE ORAL at 06:10

## 2023-10-12 RX ADMIN — HYDRALAZINE HYDROCHLORIDE 10 MG: 20 INJECTION, SOLUTION INTRAMUSCULAR; INTRAVENOUS at 04:10

## 2023-10-12 RX ADMIN — CISATRACURIUM BESYLATE 4 MG: 2 INJECTION INTRAVENOUS at 10:10

## 2023-10-12 RX ADMIN — MUPIROCIN 1 G: 20 OINTMENT TOPICAL at 08:10

## 2023-10-12 RX ADMIN — MYCOPHENOLATE MOFETIL 1000 MG: 250 CAPSULE ORAL at 03:10

## 2023-10-12 RX ADMIN — HYDROMORPHONE HYDROCHLORIDE 0.4 MG: 1 INJECTION, SOLUTION INTRAMUSCULAR; INTRAVENOUS; SUBCUTANEOUS at 11:10

## 2023-10-12 RX ADMIN — DOCUSATE SODIUM 100 MG: 100 CAPSULE, LIQUID FILLED ORAL at 03:10

## 2023-10-12 RX ADMIN — LABETALOL HYDROCHLORIDE 10 MG: 5 INJECTION, SOLUTION INTRAVENOUS at 01:10

## 2023-10-12 RX ADMIN — SODIUM CHLORIDE, SODIUM GLUCONATE, SODIUM ACETATE, POTASSIUM CHLORIDE, MAGNESIUM CHLORIDE, SODIUM PHOSPHATE, DIBASIC, AND POTASSIUM PHOSPHATE: .53; .5; .37; .037; .03; .012; .00082 INJECTION, SOLUTION INTRAVENOUS at 09:10

## 2023-10-12 RX ADMIN — FAMOTIDINE 20 MG: 20 TABLET ORAL at 08:10

## 2023-10-12 RX ADMIN — CISATRACURIUM BESYLATE 4 MG: 2 INJECTION INTRAVENOUS at 09:10

## 2023-10-12 RX ADMIN — ACETAMINOPHEN 650 MG: 325 TABLET ORAL at 08:10

## 2023-10-12 RX ADMIN — CISATRACURIUM BESYLATE 4 MG: 2 INJECTION INTRAVENOUS at 11:10

## 2023-10-12 RX ADMIN — DEXTROSE MONOHYDRATE 250 ML: 100 INJECTION, SOLUTION INTRAVENOUS at 03:10

## 2023-10-12 RX ADMIN — HEPARIN SODIUM 5000 UNITS: 5000 INJECTION INTRAVENOUS; SUBCUTANEOUS at 07:10

## 2023-10-12 RX ADMIN — SODIUM CHLORIDE: 9 INJECTION, SOLUTION INTRAVENOUS at 02:10

## 2023-10-12 RX ADMIN — FUROSEMIDE 100 MG: 10 INJECTION, SOLUTION INTRAMUSCULAR; INTRAVENOUS at 11:10

## 2023-10-12 RX ADMIN — DEXMEDETOMIDINE 8 MCG: 100 INJECTION, SOLUTION, CONCENTRATE INTRAVENOUS at 01:10

## 2023-10-12 RX ADMIN — METHYLPREDNISOLONE SODIUM SUCCINATE 500 MG: 500 INJECTION INTRAMUSCULAR; INTRAVENOUS at 08:10

## 2023-10-12 RX ADMIN — DEXAMETHASONE SODIUM PHOSPHATE 8 MG: 4 INJECTION INTRA-ARTICULAR; INTRALESIONAL; INTRAMUSCULAR; INTRAVENOUS; SOFT TISSUE at 09:10

## 2023-10-12 RX ADMIN — SODIUM BICARBONATE: 84 INJECTION, SOLUTION INTRAVENOUS at 07:10

## 2023-10-12 RX ADMIN — HEPARIN SODIUM 5000 UNITS: 5000 INJECTION, SOLUTION INTRAVENOUS; SUBCUTANEOUS at 03:10

## 2023-10-12 RX ADMIN — GLYCOPYRROLATE 0.6 MG: 0.2 INJECTION, SOLUTION INTRAMUSCULAR; INTRAVENOUS at 01:10

## 2023-10-12 RX ADMIN — CEFAZOLIN 2 G: 330 INJECTION, POWDER, FOR SOLUTION INTRAMUSCULAR; INTRAVENOUS at 09:10

## 2023-10-12 RX ADMIN — DIPHENHYDRAMINE HYDROCHLORIDE 50 MG: 50 INJECTION INTRAMUSCULAR; INTRAVENOUS at 10:10

## 2023-10-12 RX ADMIN — HEPARIN SODIUM 5000 UNITS: 5000 INJECTION, SOLUTION INTRAVENOUS; SUBCUTANEOUS at 09:10

## 2023-10-12 RX ADMIN — ANTI-THYMOCYTE GLOBULIN (RABBIT) 125 MG: 5 INJECTION, POWDER, LYOPHILIZED, FOR SOLUTION INTRAVENOUS at 11:10

## 2023-10-12 RX ADMIN — HYDROMORPHONE HYDROCHLORIDE 0.5 MG: 1 INJECTION, SOLUTION INTRAMUSCULAR; INTRAVENOUS; SUBCUTANEOUS at 12:10

## 2023-10-12 RX ADMIN — SODIUM CHLORIDE 500 ML: 9 INJECTION, SOLUTION INTRAVENOUS at 07:10

## 2023-10-12 RX ADMIN — THERA TABS 1 TABLET: TAB at 03:10

## 2023-10-12 RX ADMIN — Medication 10 MG: at 10:10

## 2023-10-12 RX ADMIN — MIDAZOLAM 1 MG: 1 INJECTION INTRAMUSCULAR; INTRAVENOUS at 08:10

## 2023-10-12 RX ADMIN — MYCOPHENOLATE MOFETIL 1000 MG: 250 CAPSULE ORAL at 09:10

## 2023-10-12 RX ADMIN — DEXTROSE AND SODIUM CHLORIDE: 5; 450 INJECTION, SOLUTION INTRAVENOUS at 02:10

## 2023-10-12 RX ADMIN — CEFAZOLIN 2 G: 2 INJECTION, POWDER, FOR SOLUTION INTRAMUSCULAR; INTRAVENOUS at 06:10

## 2023-10-12 RX ADMIN — LIDOCAINE HYDROCHLORIDE 80 MG: 20 INJECTION, SOLUTION EPIDURAL; INFILTRATION; INTRACAUDAL at 08:10

## 2023-10-12 RX ADMIN — Medication 20 MG: at 09:10

## 2023-10-12 RX ADMIN — HYDROMORPHONE HYDROCHLORIDE 0.5 MG: 1 INJECTION, SOLUTION INTRAMUSCULAR; INTRAVENOUS; SUBCUTANEOUS at 01:10

## 2023-10-12 RX ADMIN — FUROSEMIDE 80 MG: 10 INJECTION, SOLUTION INTRAVENOUS at 03:10

## 2023-10-12 RX ADMIN — HYDROMORPHONE HYDROCHLORIDE 0.6 MG: 1 INJECTION, SOLUTION INTRAMUSCULAR; INTRAVENOUS; SUBCUTANEOUS at 12:10

## 2023-10-12 RX ADMIN — CISATRACURIUM BESYLATE 12 MG: 2 INJECTION INTRAVENOUS at 08:10

## 2023-10-12 RX ADMIN — ATORVASTATIN CALCIUM 10 MG: 10 TABLET, FILM COATED ORAL at 03:10

## 2023-10-12 RX ADMIN — PROPOFOL 150 MG: 10 INJECTION, EMULSION INTRAVENOUS at 08:10

## 2023-10-12 RX ADMIN — Medication 125 MCG: at 11:10

## 2023-10-12 RX ADMIN — INSULIN HUMAN 8.16 UNITS: 100 INJECTION, SOLUTION PARENTERAL at 03:10

## 2023-10-12 RX ADMIN — SODIUM CHLORIDE 1000 ML: 9 INJECTION, SOLUTION INTRAVENOUS at 10:10

## 2023-10-12 RX ADMIN — FENTANYL CITRATE 100 MCG: 50 INJECTION INTRAMUSCULAR; INTRAVENOUS at 08:10

## 2023-10-12 RX ADMIN — ACETAMINOPHEN 500 MG: 10 INJECTION, SOLUTION INTRAVENOUS at 10:10

## 2023-10-12 RX ADMIN — MUPIROCIN: 20 OINTMENT TOPICAL at 07:10

## 2023-10-12 RX ADMIN — BISACODYL 10 MG: 5 TABLET, COATED ORAL at 08:10

## 2023-10-12 RX ADMIN — MANNITOL 25 G: 12.5 INJECTION, SOLUTION INTRAVENOUS at 11:10

## 2023-10-12 RX ADMIN — SODIUM CHLORIDE: 0.9 INJECTION, SOLUTION INTRAVENOUS at 08:10

## 2023-10-12 NOTE — ANESTHESIA POSTPROCEDURE EVALUATION
Anesthesia Post Evaluation    Patient: Heath Hernandez    Procedure(s) Performed: Procedure(s) (LRB):  TRANSPLANT, KIDNEY, LIVING DONOR (N/A)  REMOVAL, CATHETER, DIALYSIS, PERITONEAL (N/A)    Final Anesthesia Type: general      Patient location during evaluation: PACU  Patient participation: Yes- Able to Participate  Level of consciousness: awake and alert and awake  Post-procedure vital signs: reviewed and stable  Pain management: adequate  Airway patency: patent    PONV status at discharge: No PONV  Anesthetic complications: no      Cardiovascular status: blood pressure returned to baseline  Respiratory status: unassisted and spontaneous ventilation  Hydration status: euvolemic  Follow-up not needed.          Vitals Value Taken Time   /71 10/12/23 1532   Temp 37.1 °C (98.8 °F) 10/12/23 1351   Pulse 85 10/12/23 1536   Resp 12 10/12/23 1536   SpO2 95 % 10/12/23 1536   Vitals shown include unvalidated device data.      No case tracking events are documented in the log.      Pain/Keron Score: Keron Score: 8 (10/12/2023  3:15 PM)

## 2023-10-12 NOTE — PROGRESS NOTES
Pt arrived onto TSU via bed from PACU. R KT done. Thymo finishing up. VS stable and pt in no distress and in no pain. Accompanied by family. Fluids running. Urine collection on the hour. Incision w dermabond intact.

## 2023-10-12 NOTE — NURSING TRANSFER
Nursing Transfer Note      10/12/2023   5:25 PM    Nurse giving handoff:cheyanne gay  Nurse receiving handoff:denia gay    Reason patient is being transferred: MD order    Transfer To: [id number]    Transfer via bed    Transfer with cardiac monitoring    Transported by PCT      Telemetry: Box Number [id number]  Order for Tele Monitor? Yes    Additional Lines: Pickering Catheter        Medicines sent: Insulin    Any special needs or follow-up needed: Labs completed at 1710    Patient belongings transferred with patient: No    Chart send with patient: Yes    Notified: spouse    Patient reassessed at: 1700 (

## 2023-10-12 NOTE — TRANSFER OF CARE
"Anesthesia Transfer of Care Note    Patient: Heath Hernandez    Procedure(s) Performed: Procedure(s) (LRB):  TRANSPLANT, KIDNEY, LIVING DONOR (N/A)  REMOVAL, CATHETER, DIALYSIS, PERITONEAL (N/A)    Patient location: PACU    Anesthesia Type: general    Transport from OR: Transported from OR on 100% O2 by closed face mask with adequate spontaneous ventilation    Post pain: adequate analgesia    Post assessment: no apparent anesthetic complications and tolerated procedure well    Post vital signs: stable    Level of consciousness: awake and responds to stimulation    Nausea/Vomiting: no nausea/vomiting    Complications: none    Transfer of care protocol was followed      Last vitals:   Visit Vitals  BP (!) 156/84   Pulse 69   Temp 36.7 °C (98 °F) (Skin)   Resp 20   Ht 5' 10" (1.778 m)   Wt 81.6 kg (180 lb)   SpO2 100%   BMI 25.83 kg/m²     "

## 2023-10-12 NOTE — OP NOTE
Operative Report    Date of Procedure: 10/12/2023  Date of Transplant (UNOS): 10/12/23    Surgeons:  Surgeon(s) and Role:     * Cuauhtemoc Morales MD - Primary     * Milind Avilez MD - Resident - Assisting    First Assistant Attestation:  The indicated resident served as first assistant for this procedure.    Pre-operative Diagnosis: ESRD, requiring chronic dialysis secondary to Diabetes Mellitus - Type II  Post-operative Diagnosis: Same    Procedure(s) Performed:   Back Table Preparation of Left Kidney    Living Kidney transplant  Removal of PD catheter    Anesthesia: General endotracheal    Preamble  Indications and Patient Counseling: The patient is a 63 y.o. year-old male with end-stage kidney disease secondary to Diabetes Mellitus - Type II who has been evaluated for a kidney transplant.  The procedure was thoroughly discussed with the patient, including potential risks, complications, and alternatives.  Specific complications mentioned included death, graft non-function, bleeding, infection, and rejection, as well as the possibility of other complications not specifically mentioned.    Donor Risk Factors: Prior to the operation, the patient was advised of any donor-specific risk factors requiring specific disclosure.  Factors in this case included nothing that required specific disclosure.      Specific [facility] donor risk criteria for the organ donor include:  None    All questions were answered, the patient voiced appropriate understanding, and he agreed to proceed with the planned procedure.    ABO Confirmation: Immediately following arrival of the donor organ and prior to implantation, a formal ABO confirmation was done according to hospital and UNOS policies.  I confirmed the UNOS ID number ([id number]) of the donor organ and the donor and recipient ABO types, directly verifying these data by comparison with the UNOS Match Run report ().  This confirmation was personally done by an attending surgeon and circulating  nurse, and is officially documented elsewhere.    Time-Out: A complete time out was carried out prior to incision, with confirmation of patient identity, correct procedure, correct operative site, appropriate antibiotic prophylaxis, review of any known allergies, and presence of all needed equipment.    Procedure in Detail  Prior to starting the operation, the left kidney  was prepared on the back table. Arterial anatomy was single. Venous anatomy was single. Ureteral anatomy was single. Back table vascular reconstruction was not required .  Unneeded fat was removed from the kidney, the vessels were cleaned of adherent tissue and tested for leaks, and the kidney was maintained at ice temperature in organ preservation solution until it was brought to the operative field.     The patient was brought into the operating room and placed in a supine position on the OR table.  After the induction of general endotracheal anesthesia, lines were placed by the anesthesiologist.  The urinary bladder was catheterized and irrigated with antibiotic solution.  There was no tension on the axillae and all pressure points were padded.  Sequential compression boots were used as were Ron Huggers.  The abdomen was prepped and draped in the usual sterile fashion.  Skin was incised over the right with a knife and deepened with electrocautery.  The peritoneum and its contents were swept medially, exposing the right external iliac artery and the right external iliac vein.  The Bookwalter retractor was used to provide exposure.  Overlying lymphatics were ligated or cauterized and the vessels were dissected free for a length compatible with anastomosis.  The kidney was brought to the OR table at 10/12/2023 11:05 AM.  Venous control was obtained with a vascular clamp.  A venotomy was made, the vein irrigated, and an end renal to right external iliac vein anastomosis was created with 5-0 polypropylene.  Arterial control was obtained with a  vascular clamp.  Arteriotomy was made, the artery irrigated, and an end renal to right external iliac artery anastomosis was created with 6-0 polypropylene.  The kidney was unclamped and reperfused at 10/12/2023 11:26 AM.  Reperfusion quality was good. Intraoperative urine production was observed.  After hemostasis was obtained, a Lich uretero-neocystostomy was created.  The bladder was filled and identified, opened, and the anastomosis created using 6-0 PDS.  The bladder muscle was closed over the distal ureter to create an antireflux tunnel.  A ureteral stent was used.  With the kidney well perfused and sitting appropriately without tension on the anastomoses, viscera were replaced in their usual position.  The wound was closed after a final check for hemostasis.  Overall, the graft quality was assessed to be good. PD cathether was removed and peritoneal defect closed. At the end of the case the needle, sponge and instrument counts were all correct.  Sterile dressings were applied and the patient was brought to the recovery room/ICU in good condition.    Estimated Blood Loss: 50 mL  Fluids Administered:    Drains: None  Specimens: none    Findings:    Organ Transplanted: Left Kidney    Arterial Anatomy: single  Number of Arteries: 1  Configuration of Multiple Arteries: not applicable  Venous Anatomy: single  Number of Veins: 1  Ureteral Anatomy: single  Number of Ureters: 1  Reperfusion Quality: good  Overall Graft Quality: good  Intraoperative Urine Production: yes  Pickering: not to be removed before 2 days.  Ureteral Stent: Yes    Ischemic Times:   Anastomosis (warm ischemia) time: 21 minutes   Cold ischemia time: 44 minutes  Total ischemia time: 65 minutes    Donor Data:  UNOS ID: [id number]   UNOS Match Run:    Donor Type: Living   Donor CMV Status:    Donor HBcAB:    Donor HCV Status:

## 2023-10-12 NOTE — OP NOTE
Pre-operative Discussion Note  Kidney Transplant Surgery    Heath Hernandez is a 63 y.o. male with ESRD, requiring chronic dialysis admitted for kidney transplant.  I discussed the planned procedure in detail, including expected hospital course and outcomes, benefits, risks, and potential complications.  Complications discussed included death, graft failure, bleeding, infection, vascular thrombosis, and rejection.  I discussed the risks of anesthesia, as well as the potential need for re-operation.  The possibility of other complications not specifically mentioned was also discussed.  Also, I discussed the need for lifelong immunosuppression and the possibility of serious complications from immunosuppressive drugs.    The discussion included the risks that the patient will incur if he elects to not have the proposed procedure.    Relevant donor-specific risk factors were disclosed and discussed with the patient, including:       Specific [facility] donor risk criteria for the organ donor include:  None      The patient was SARS-CoV-2 /COVID-19 tested with negative results.    COVID-19: I discussed the possibility of COVID-19 transmission with the patient. Although IKE testing is available for the virus using a technique which in theory should be very accurate, there is no data yet regarding the likelihood of a  false-negative test leading to virus transmission. Based on accuracy of testing for other viruses, it is expected that this risk is extremely small.     I also discussed that transplant immunosuppression will increase susceptibility to COVID-19 and other viruses, and that although we use stringent precautions to protect patients from infection, it is possible for a transplant recipient to contract this infection. If COVID-19 infection should occur, it would be a serious matter with a significant risk of death.    All questions were answered.  The patient and available family members voice understanding and agree  to proceed with the transplant.    UNOS Patient Status  Note on scores:  ICU = 10 = total assistance  TSU = 20-30 = partial assistance  Outpatient admitted for transplant requiring medical care in last year = 40-50 = partial assistance  Scores 60 or higher indicate no assistance, meaning no need for medical care in last year. This would be very unusual for a transplant candidate.    UNOS Patient Status  Functional Status: 50% - Requires considerable assistance and frequent medical care  Physical Capacity: No Limitations

## 2023-10-12 NOTE — ANESTHESIA PROCEDURE NOTES
Intubation    Date/Time: 10/12/2023 9:01 AM    Performed by: Brad Ballard MD  Authorized by: Ayush Baugh MD    Intubation:     Induction:  Intravenous    Intubated:  Postinduction    Mask Ventilation:  Easy mask    Attempts:  1    Attempted By:  Staff anesthesiologist    Method of Intubation:  Direct    Blade:  Santiago 2    Laryngeal View Grade: Grade I - full view of cords      Difficult Airway Encountered?: No      Complications:  None    Airway Device:  Oral endotracheal tube    Airway Device Size:  7.5    Style/Cuff Inflation:  Cuffed    Tube secured:  22    Secured at:  The lips    Placement Verified By:  Capnometry    Complicating Factors:  None    Findings Post-Intubation:  BS equal bilateral and atraumatic/condition of teeth unchanged

## 2023-10-12 NOTE — ANESTHESIA PROCEDURE NOTES
Arterial    Diagnosis: Kidney transplant    Patient location during procedure: done in OR    Staffing  Authorizing Provider: Ayush Baugh MD  Performing Provider: Brad Ballard MD    Staffing  Performed by: Brad Ballard MD  Authorized by: Ayush Baugh MD    Anesthesiologist was present at the time of the procedure.    Preanesthetic Checklist  Completed: patient identified, IV checked, site marked, risks and benefits discussed, surgical consent, monitors and equipment checked, pre-op evaluation, timeout performed and anesthesia consent givenArterial  Skin Prep: chlorhexidine gluconate  Local Infiltration: none  Orientation: right  Location: radial    Catheter Size: 20 G  Catheter placement by Ultrasound guidance. Heme positive aspiration all ports.   Vessel Caliber: medium, patent, compressibility normal  Needle advanced into vessel with real time Ultrasound guidance.Insertion Attempts: 1  Assessment  Dressing: secured with tape and tegaderm  Patient: Tolerated well

## 2023-10-12 NOTE — INTERVAL H&P NOTE
The patient has been examined and the H&P has been reviewed:    I concur with the findings and no changes have occurred since H&P was written.    Last time he did dialysis was PD last night. Has had no other abdominal surgeries besides PD catheter placement and denies history of PD catheter infections.  Still makes normal amount of urine.  Understands that PD catheter will be removed at time of transplant today.    Surgery risks, benefits and alternative options discussed and understood by patient/family.          There are no hospital problems to display for this patient.

## 2023-10-13 ENCOUNTER — ANESTHESIA (OUTPATIENT)
Dept: SURGERY | Facility: HOSPITAL | Age: 63
DRG: 652 | End: 2023-10-13
Payer: COMMERCIAL

## 2023-10-13 PROBLEM — Z29.89 PROPHYLACTIC IMMUNOTHERAPY: Status: ACTIVE | Noted: 2023-10-13

## 2023-10-13 PROBLEM — Z91.89 AT RISK FOR OPPORTUNISTIC INFECTIONS: Status: ACTIVE | Noted: 2023-10-13

## 2023-10-13 PROBLEM — D63.8 ANEMIA, CHRONIC DISEASE: Status: ACTIVE | Noted: 2023-10-13

## 2023-10-13 PROBLEM — Z79.60 LONG-TERM USE OF IMMUNOSUPPRESSANT MEDICATION: Status: ACTIVE | Noted: 2023-10-13

## 2023-10-13 PROBLEM — D62 ACUTE BLOOD LOSS ANEMIA: Status: ACTIVE | Noted: 2023-10-13

## 2023-10-13 PROBLEM — Z94.0 S/P KIDNEY TRANSPLANT: Status: ACTIVE | Noted: 2023-10-13

## 2023-10-13 LAB
ALBUMIN SERPL BCP-MCNC: 2.1 G/DL (ref 3.5–5.2)
ALBUMIN SERPL BCP-MCNC: 2.2 G/DL (ref 3.5–5.2)
ALBUMIN SERPL BCP-MCNC: 2.4 G/DL (ref 3.5–5.2)
ANION GAP SERPL CALC-SCNC: 10 MMOL/L (ref 8–16)
ANION GAP SERPL CALC-SCNC: 11 MMOL/L (ref 8–16)
ANION GAP SERPL CALC-SCNC: 9 MMOL/L (ref 8–16)
ANION GAP SERPL CALC-SCNC: 9 MMOL/L (ref 8–16)
BASOPHILS # BLD AUTO: 0.01 K/UL (ref 0–0.2)
BASOPHILS # BLD AUTO: 0.02 K/UL (ref 0–0.2)
BASOPHILS NFR BLD: 0.1 % (ref 0–1.9)
BASOPHILS NFR BLD: 0.2 % (ref 0–1.9)
BUN SERPL-MCNC: 70 MG/DL (ref 8–23)
BUN SERPL-MCNC: 74 MG/DL (ref 8–23)
BUN SERPL-MCNC: 75 MG/DL (ref 8–23)
BUN SERPL-MCNC: 80 MG/DL (ref 8–23)
CALCIUM SERPL-MCNC: 6.9 MG/DL (ref 8.7–10.5)
CALCIUM SERPL-MCNC: 7.5 MG/DL (ref 8.7–10.5)
CALCIUM SERPL-MCNC: 7.5 MG/DL (ref 8.7–10.5)
CALCIUM SERPL-MCNC: 7.7 MG/DL (ref 8.7–10.5)
CHLORIDE SERPL-SCNC: 108 MMOL/L (ref 95–110)
CHLORIDE SERPL-SCNC: 108 MMOL/L (ref 95–110)
CHLORIDE SERPL-SCNC: 109 MMOL/L (ref 95–110)
CHLORIDE SERPL-SCNC: 112 MMOL/L (ref 95–110)
CO2 SERPL-SCNC: 17 MMOL/L (ref 23–29)
CO2 SERPL-SCNC: 18 MMOL/L (ref 23–29)
CO2 SERPL-SCNC: 19 MMOL/L (ref 23–29)
CO2 SERPL-SCNC: 21 MMOL/L (ref 23–29)
CREAT SERPL-MCNC: 11.1 MG/DL (ref 0.5–1.4)
CREAT SERPL-MCNC: 12.9 MG/DL (ref 0.5–1.4)
CREAT SERPL-MCNC: 8.8 MG/DL (ref 0.5–1.4)
CREAT SERPL-MCNC: 9.4 MG/DL (ref 0.5–1.4)
DIFFERENTIAL METHOD: ABNORMAL
DIFFERENTIAL METHOD: ABNORMAL
EOSINOPHIL # BLD AUTO: 0 K/UL (ref 0–0.5)
EOSINOPHIL # BLD AUTO: 0 K/UL (ref 0–0.5)
EOSINOPHIL NFR BLD: 0 % (ref 0–8)
EOSINOPHIL NFR BLD: 0 % (ref 0–8)
ERYTHROCYTE [DISTWIDTH] IN BLOOD BY AUTOMATED COUNT: 14 % (ref 11.5–14.5)
ERYTHROCYTE [DISTWIDTH] IN BLOOD BY AUTOMATED COUNT: 14.2 % (ref 11.5–14.5)
EST. GFR  (NO RACE VARIABLE): 4 ML/MIN/1.73 M^2
EST. GFR  (NO RACE VARIABLE): 4.8 ML/MIN/1.73 M^2
EST. GFR  (NO RACE VARIABLE): 5.8 ML/MIN/1.73 M^2
EST. GFR  (NO RACE VARIABLE): 6.3 ML/MIN/1.73 M^2
GLUCOSE SERPL-MCNC: 162 MG/DL (ref 70–110)
GLUCOSE SERPL-MCNC: 185 MG/DL (ref 70–110)
GLUCOSE SERPL-MCNC: 187 MG/DL (ref 70–110)
GLUCOSE SERPL-MCNC: 199 MG/DL (ref 70–110)
HCT VFR BLD AUTO: 22.8 % (ref 40–54)
HCT VFR BLD AUTO: 24.5 % (ref 40–54)
HGB BLD-MCNC: 6.5 G/DL (ref 14–18)
HGB BLD-MCNC: 7.6 G/DL (ref 14–18)
HGB BLD-MCNC: 8.1 G/DL (ref 14–18)
HGB BLD-MCNC: 8.2 G/DL (ref 14–18)
IMM GRANULOCYTES # BLD AUTO: 0.04 K/UL (ref 0–0.04)
IMM GRANULOCYTES # BLD AUTO: 0.06 K/UL (ref 0–0.04)
IMM GRANULOCYTES NFR BLD AUTO: 0.4 % (ref 0–0.5)
IMM GRANULOCYTES NFR BLD AUTO: 0.5 % (ref 0–0.5)
LYMPHOCYTES # BLD AUTO: 0 K/UL (ref 1–4.8)
LYMPHOCYTES # BLD AUTO: 0 K/UL (ref 1–4.8)
LYMPHOCYTES NFR BLD: 0.2 % (ref 18–48)
LYMPHOCYTES NFR BLD: 0.3 % (ref 18–48)
MAGNESIUM SERPL-MCNC: 1.9 MG/DL (ref 1.6–2.6)
MCH RBC QN AUTO: 30.4 PG (ref 27–31)
MCH RBC QN AUTO: 30.5 PG (ref 27–31)
MCHC RBC AUTO-ENTMCNC: 33.1 G/DL (ref 32–36)
MCHC RBC AUTO-ENTMCNC: 33.3 G/DL (ref 32–36)
MCV RBC AUTO: 91 FL (ref 82–98)
MCV RBC AUTO: 92 FL (ref 82–98)
MONOCYTES # BLD AUTO: 0.2 K/UL (ref 0.3–1)
MONOCYTES # BLD AUTO: 0.2 K/UL (ref 0.3–1)
MONOCYTES NFR BLD: 1.8 % (ref 4–15)
MONOCYTES NFR BLD: 2.4 % (ref 4–15)
NEUTROPHILS # BLD AUTO: 11 K/UL (ref 1.8–7.7)
NEUTROPHILS # BLD AUTO: 9.8 K/UL (ref 1.8–7.7)
NEUTROPHILS NFR BLD: 96.8 % (ref 38–73)
NEUTROPHILS NFR BLD: 97.3 % (ref 38–73)
NRBC BLD-RTO: 0 /100 WBC
NRBC BLD-RTO: 0 /100 WBC
PHOSPHATE SERPL-MCNC: 5.8 MG/DL (ref 2.7–4.5)
PHOSPHATE SERPL-MCNC: 5.9 MG/DL (ref 2.7–4.5)
PHOSPHATE SERPL-MCNC: 6.2 MG/DL (ref 2.7–4.5)
PLATELET # BLD AUTO: 152 K/UL (ref 150–450)
PLATELET # BLD AUTO: 171 K/UL (ref 150–450)
PMV BLD AUTO: 10.5 FL (ref 9.2–12.9)
PMV BLD AUTO: 10.7 FL (ref 9.2–12.9)
POCT GLUCOSE: 170 MG/DL (ref 70–110)
POCT GLUCOSE: 174 MG/DL (ref 70–110)
POCT GLUCOSE: 193 MG/DL (ref 70–110)
POTASSIUM SERPL-SCNC: 4.3 MMOL/L (ref 3.5–5.1)
POTASSIUM SERPL-SCNC: 4.6 MMOL/L (ref 3.5–5.1)
POTASSIUM SERPL-SCNC: 4.6 MMOL/L (ref 3.5–5.1)
POTASSIUM SERPL-SCNC: 5.1 MMOL/L (ref 3.5–5.1)
RBC # BLD AUTO: 2.5 M/UL (ref 4.6–6.2)
RBC # BLD AUTO: 2.66 M/UL (ref 4.6–6.2)
SODIUM SERPL-SCNC: 136 MMOL/L (ref 136–145)
SODIUM SERPL-SCNC: 137 MMOL/L (ref 136–145)
SODIUM SERPL-SCNC: 138 MMOL/L (ref 136–145)
SODIUM SERPL-SCNC: 140 MMOL/L (ref 136–145)
TACROLIMUS BLD-MCNC: 2.7 NG/ML (ref 5–15)
WBC # BLD AUTO: 10.13 K/UL (ref 3.9–12.7)
WBC # BLD AUTO: 11.25 K/UL (ref 3.9–12.7)

## 2023-10-13 PROCEDURE — D9220A PRA ANESTHESIA: Mod: ANES,,, | Performed by: ANESTHESIOLOGY

## 2023-10-13 PROCEDURE — D9220A PRA ANESTHESIA: Mod: CRNA,,, | Performed by: NURSE ANESTHETIST, CERTIFIED REGISTERED

## 2023-10-13 PROCEDURE — 36000707: Performed by: TRANSPLANT SURGERY

## 2023-10-13 PROCEDURE — 71000033 HC RECOVERY, INTIAL HOUR: Performed by: TRANSPLANT SURGERY

## 2023-10-13 PROCEDURE — 63600175 PHARM REV CODE 636 W HCPCS: Performed by: NURSE ANESTHETIST, CERTIFIED REGISTERED

## 2023-10-13 PROCEDURE — 94761 N-INVAS EAR/PLS OXIMETRY MLT: CPT

## 2023-10-13 PROCEDURE — 99222 1ST HOSP IP/OBS MODERATE 55: CPT | Mod: ,,, | Performed by: NURSE PRACTITIONER

## 2023-10-13 PROCEDURE — 85018 HEMOGLOBIN: CPT | Mod: 91

## 2023-10-13 PROCEDURE — 25000003 PHARM REV CODE 250: Performed by: NURSE ANESTHETIST, CERTIFIED REGISTERED

## 2023-10-13 PROCEDURE — 80197 ASSAY OF TACROLIMUS: CPT

## 2023-10-13 PROCEDURE — 99222 PR INITIAL HOSPITAL CARE,LEVL II: ICD-10-PCS | Mod: ,,, | Performed by: NURSE PRACTITIONER

## 2023-10-13 PROCEDURE — D9220A PRA ANESTHESIA: ICD-10-PCS | Mod: ANES,,, | Performed by: ANESTHESIOLOGY

## 2023-10-13 PROCEDURE — 36415 COLL VENOUS BLD VENIPUNCTURE: CPT

## 2023-10-13 PROCEDURE — 80069 RENAL FUNCTION PANEL: CPT | Mod: 91

## 2023-10-13 PROCEDURE — 63600175 PHARM REV CODE 636 W HCPCS: Performed by: NURSE PRACTITIONER

## 2023-10-13 PROCEDURE — 99233 PR SUBSEQUENT HOSPITAL CARE,LEVL III: ICD-10-PCS | Mod: 24,,,

## 2023-10-13 PROCEDURE — 36430 TRANSFUSION BLD/BLD COMPNT: CPT

## 2023-10-13 PROCEDURE — 35840 PR EXPLOR POSTOP BLEED,INFEC,CLOT-ABD: ICD-10-PCS | Mod: 78,,, | Performed by: TRANSPLANT SURGERY

## 2023-10-13 PROCEDURE — 25000003 PHARM REV CODE 250: Performed by: STUDENT IN AN ORGANIZED HEALTH CARE EDUCATION/TRAINING PROGRAM

## 2023-10-13 PROCEDURE — 27201423 OPTIME MED/SURG SUP & DEVICES STERILE SUPPLY: Performed by: TRANSPLANT SURGERY

## 2023-10-13 PROCEDURE — 25000003 PHARM REV CODE 250

## 2023-10-13 PROCEDURE — 35840 EXPLORE ABDOMINAL VESSELS: CPT | Mod: 78,,, | Performed by: TRANSPLANT SURGERY

## 2023-10-13 PROCEDURE — 63600175 PHARM REV CODE 636 W HCPCS

## 2023-10-13 PROCEDURE — 80069 RENAL FUNCTION PANEL: CPT

## 2023-10-13 PROCEDURE — S5010 5% DEXTROSE AND 0.45% SALINE: HCPCS | Performed by: NURSE PRACTITIONER

## 2023-10-13 PROCEDURE — 71000015 HC POSTOP RECOV 1ST HR: Performed by: TRANSPLANT SURGERY

## 2023-10-13 PROCEDURE — 37000009 HC ANESTHESIA EA ADD 15 MINS: Performed by: TRANSPLANT SURGERY

## 2023-10-13 PROCEDURE — 36000706: Performed by: TRANSPLANT SURGERY

## 2023-10-13 PROCEDURE — 99233 SBSQ HOSP IP/OBS HIGH 50: CPT | Mod: 24,,,

## 2023-10-13 PROCEDURE — 80048 BASIC METABOLIC PNL TOTAL CA: CPT | Performed by: STUDENT IN AN ORGANIZED HEALTH CARE EDUCATION/TRAINING PROGRAM

## 2023-10-13 PROCEDURE — 63600175 PHARM REV CODE 636 W HCPCS: Mod: JZ,JG

## 2023-10-13 PROCEDURE — 85025 COMPLETE CBC W/AUTO DIFF WBC: CPT | Mod: 91

## 2023-10-13 PROCEDURE — P9016 RBC LEUKOCYTES REDUCED: HCPCS | Performed by: ANESTHESIOLOGY

## 2023-10-13 PROCEDURE — S5010 5% DEXTROSE AND 0.45% SALINE: HCPCS | Performed by: STUDENT IN AN ORGANIZED HEALTH CARE EDUCATION/TRAINING PROGRAM

## 2023-10-13 PROCEDURE — C1729 CATH, DRAINAGE: HCPCS | Performed by: TRANSPLANT SURGERY

## 2023-10-13 PROCEDURE — 83735 ASSAY OF MAGNESIUM: CPT

## 2023-10-13 PROCEDURE — 37000008 HC ANESTHESIA 1ST 15 MINUTES: Performed by: TRANSPLANT SURGERY

## 2023-10-13 PROCEDURE — 25000003 PHARM REV CODE 250: Performed by: NURSE PRACTITIONER

## 2023-10-13 PROCEDURE — 86920 COMPATIBILITY TEST SPIN: CPT

## 2023-10-13 PROCEDURE — 82962 GLUCOSE BLOOD TEST: CPT | Performed by: TRANSPLANT SURGERY

## 2023-10-13 PROCEDURE — D9220A PRA ANESTHESIA: ICD-10-PCS | Mod: CRNA,,, | Performed by: NURSE ANESTHETIST, CERTIFIED REGISTERED

## 2023-10-13 PROCEDURE — 85025 COMPLETE CBC W/AUTO DIFF WBC: CPT | Performed by: STUDENT IN AN ORGANIZED HEALTH CARE EDUCATION/TRAINING PROGRAM

## 2023-10-13 PROCEDURE — 20600001 HC STEP DOWN PRIVATE ROOM

## 2023-10-13 RX ORDER — PROPOFOL 10 MG/ML
VIAL (ML) INTRAVENOUS
Status: DISCONTINUED | OUTPATIENT
Start: 2023-10-13 | End: 2023-10-13

## 2023-10-13 RX ORDER — TACROLIMUS 1 MG/1
1 CAPSULE ORAL ONCE
Status: COMPLETED | OUTPATIENT
Start: 2023-10-13 | End: 2023-10-13

## 2023-10-13 RX ORDER — TACROLIMUS 1 MG/1
6 CAPSULE ORAL EVERY 12 HOURS
Qty: 360 CAPSULE | Refills: 11 | Status: SHIPPED | OUTPATIENT
Start: 2023-10-13 | End: 2023-10-17 | Stop reason: SDUPTHER

## 2023-10-13 RX ORDER — INSULIN ASPART 100 [IU]/ML
0-10 INJECTION, SOLUTION INTRAVENOUS; SUBCUTANEOUS
Status: DISCONTINUED | OUTPATIENT
Start: 2023-10-13 | End: 2023-10-14

## 2023-10-13 RX ORDER — HYDROMORPHONE HYDROCHLORIDE 1 MG/ML
0.2 INJECTION, SOLUTION INTRAMUSCULAR; INTRAVENOUS; SUBCUTANEOUS EVERY 5 MIN PRN
Status: DISCONTINUED | OUTPATIENT
Start: 2023-10-13 | End: 2023-10-13 | Stop reason: HOSPADM

## 2023-10-13 RX ORDER — SODIUM CHLORIDE 0.9 % (FLUSH) 0.9 %
10 SYRINGE (ML) INJECTION
Status: DISCONTINUED | OUTPATIENT
Start: 2023-10-13 | End: 2023-10-13 | Stop reason: HOSPADM

## 2023-10-13 RX ORDER — FENTANYL CITRATE 50 UG/ML
INJECTION, SOLUTION INTRAMUSCULAR; INTRAVENOUS
Status: DISCONTINUED | OUTPATIENT
Start: 2023-10-13 | End: 2023-10-13

## 2023-10-13 RX ORDER — DEXMEDETOMIDINE HYDROCHLORIDE 100 UG/ML
INJECTION, SOLUTION INTRAVENOUS
Status: DISCONTINUED | OUTPATIENT
Start: 2023-10-13 | End: 2023-10-13

## 2023-10-13 RX ORDER — ROCURONIUM BROMIDE 10 MG/ML
INJECTION, SOLUTION INTRAVENOUS
Status: DISCONTINUED | OUTPATIENT
Start: 2023-10-13 | End: 2023-10-13

## 2023-10-13 RX ORDER — SUCCINYLCHOLINE CHLORIDE 20 MG/ML
INJECTION INTRAMUSCULAR; INTRAVENOUS
Status: DISCONTINUED | OUTPATIENT
Start: 2023-10-13 | End: 2023-10-13

## 2023-10-13 RX ORDER — MIDAZOLAM HYDROCHLORIDE 1 MG/ML
INJECTION, SOLUTION INTRAMUSCULAR; INTRAVENOUS
Status: DISCONTINUED | OUTPATIENT
Start: 2023-10-13 | End: 2023-10-13

## 2023-10-13 RX ORDER — HYDROCODONE BITARTRATE AND ACETAMINOPHEN 500; 5 MG/1; MG/1
TABLET ORAL
Status: DISCONTINUED | OUTPATIENT
Start: 2023-10-13 | End: 2023-10-16

## 2023-10-13 RX ORDER — CALCIUM GLUCONATE 20 MG/ML
1 INJECTION, SOLUTION INTRAVENOUS
Status: COMPLETED | OUTPATIENT
Start: 2023-10-13 | End: 2023-10-13

## 2023-10-13 RX ORDER — PREDNISONE 5 MG/1
TABLET ORAL
Qty: 70 TABLET | Refills: 11 | Status: SHIPPED | OUTPATIENT
Start: 2023-10-13

## 2023-10-13 RX ORDER — VALGANCICLOVIR 450 MG/1
900 TABLET, FILM COATED ORAL DAILY
Qty: 60 TABLET | Refills: 2 | Status: SHIPPED | OUTPATIENT
Start: 2023-10-13 | End: 2023-10-17 | Stop reason: SDUPTHER

## 2023-10-13 RX ORDER — LIDOCAINE HYDROCHLORIDE 20 MG/ML
INJECTION, SOLUTION EPIDURAL; INFILTRATION; INTRACAUDAL; PERINEURAL
Status: DISCONTINUED | OUTPATIENT
Start: 2023-10-13 | End: 2023-10-13

## 2023-10-13 RX ORDER — DEXAMETHASONE SODIUM PHOSPHATE 4 MG/ML
INJECTION, SOLUTION INTRA-ARTICULAR; INTRALESIONAL; INTRAMUSCULAR; INTRAVENOUS; SOFT TISSUE
Status: DISCONTINUED | OUTPATIENT
Start: 2023-10-13 | End: 2023-10-13

## 2023-10-13 RX ORDER — ONDANSETRON 2 MG/ML
INJECTION INTRAMUSCULAR; INTRAVENOUS
Status: DISCONTINUED | OUTPATIENT
Start: 2023-10-13 | End: 2023-10-13

## 2023-10-13 RX ORDER — MYCOPHENOLATE MOFETIL 250 MG/1
1000 CAPSULE ORAL 2 TIMES DAILY
Qty: 240 CAPSULE | Refills: 11 | Status: SHIPPED | OUTPATIENT
Start: 2023-10-13

## 2023-10-13 RX ORDER — HALOPERIDOL 5 MG/ML
0.5 INJECTION INTRAMUSCULAR EVERY 10 MIN PRN
Status: DISCONTINUED | OUTPATIENT
Start: 2023-10-13 | End: 2023-10-13 | Stop reason: HOSPADM

## 2023-10-13 RX ORDER — GLUCAGON 1 MG
1 KIT INJECTION
Status: DISCONTINUED | OUTPATIENT
Start: 2023-10-13 | End: 2023-10-17 | Stop reason: HOSPADM

## 2023-10-13 RX ORDER — FUROSEMIDE 10 MG/ML
INJECTION INTRAMUSCULAR; INTRAVENOUS
Status: DISCONTINUED | OUTPATIENT
Start: 2023-10-13 | End: 2023-10-13

## 2023-10-13 RX ORDER — CEFAZOLIN SODIUM 1 G/3ML
INJECTION, POWDER, FOR SOLUTION INTRAMUSCULAR; INTRAVENOUS
Status: DISCONTINUED | OUTPATIENT
Start: 2023-10-13 | End: 2023-10-13

## 2023-10-13 RX ORDER — SULFAMETHOXAZOLE AND TRIMETHOPRIM 400; 80 MG/1; MG/1
1 TABLET ORAL DAILY
Qty: 30 TABLET | Refills: 5 | Status: SHIPPED | OUTPATIENT
Start: 2023-10-13 | End: 2023-10-30 | Stop reason: DRUGHIGH

## 2023-10-13 RX ORDER — TACROLIMUS 1 MG/1
4 CAPSULE ORAL 2 TIMES DAILY
Status: DISCONTINUED | OUTPATIENT
Start: 2023-10-13 | End: 2023-10-14

## 2023-10-13 RX ORDER — PHENYLEPHRINE HCL IN 0.9% NACL 1 MG/10 ML
SYRINGE (ML) INTRAVENOUS
Status: DISCONTINUED | OUTPATIENT
Start: 2023-10-13 | End: 2023-10-13

## 2023-10-13 RX ORDER — HEPARIN SODIUM 5000 [USP'U]/ML
5000 INJECTION, SOLUTION INTRAVENOUS; SUBCUTANEOUS EVERY 8 HOURS
Status: DISCONTINUED | OUTPATIENT
Start: 2023-10-14 | End: 2023-10-17 | Stop reason: HOSPADM

## 2023-10-13 RX ADMIN — ACETAMINOPHEN 650 MG: 325 TABLET ORAL at 08:10

## 2023-10-13 RX ADMIN — Medication 200 MCG: at 01:10

## 2023-10-13 RX ADMIN — ACETAMINOPHEN 650 MG: 325 TABLET ORAL at 05:10

## 2023-10-13 RX ADMIN — METHYLPREDNISOLONE SODIUM SUCCINATE 250 MG: 125 INJECTION, POWDER, FOR SOLUTION INTRAMUSCULAR; INTRAVENOUS at 11:10

## 2023-10-13 RX ADMIN — DEXAMETHASONE SODIUM PHOSPHATE 4 MG: 4 INJECTION INTRA-ARTICULAR; INTRALESIONAL; INTRAMUSCULAR; INTRAVENOUS; SOFT TISSUE at 01:10

## 2023-10-13 RX ADMIN — MIDAZOLAM 2 MG: 1 INJECTION INTRAMUSCULAR; INTRAVENOUS at 01:10

## 2023-10-13 RX ADMIN — TACROLIMUS 1 MG: 1 CAPSULE ORAL at 12:10

## 2023-10-13 RX ADMIN — DOCUSATE SODIUM 100 MG: 100 CAPSULE, LIQUID FILLED ORAL at 08:10

## 2023-10-13 RX ADMIN — MYCOPHENOLATE MOFETIL 1000 MG: 250 CAPSULE ORAL at 08:10

## 2023-10-13 RX ADMIN — ROCURONIUM BROMIDE 10 MG: 10 INJECTION INTRAVENOUS at 01:10

## 2023-10-13 RX ADMIN — CALCIUM GLUCONATE 1 G: 20 INJECTION, SOLUTION INTRAVENOUS at 11:10

## 2023-10-13 RX ADMIN — FENTANYL CITRATE 25 MCG: 50 INJECTION INTRAMUSCULAR; INTRAVENOUS at 03:10

## 2023-10-13 RX ADMIN — SUCCINYLCHOLINE CHLORIDE 160 MG: 20 INJECTION, SOLUTION INTRAMUSCULAR; INTRAVENOUS; PARENTERAL at 01:10

## 2023-10-13 RX ADMIN — SODIUM BICARBONATE: 84 INJECTION, SOLUTION INTRAVENOUS at 08:10

## 2023-10-13 RX ADMIN — ROCURONIUM BROMIDE 40 MG: 10 INJECTION INTRAVENOUS at 01:10

## 2023-10-13 RX ADMIN — SODIUM CHLORIDE, SODIUM GLUCONATE, SODIUM ACETATE, POTASSIUM CHLORIDE, MAGNESIUM CHLORIDE, SODIUM PHOSPHATE, DIBASIC, AND POTASSIUM PHOSPHATE: .53; .5; .37; .037; .03; .012; .00082 INJECTION, SOLUTION INTRAVENOUS at 01:10

## 2023-10-13 RX ADMIN — MUPIROCIN 1 G: 20 OINTMENT TOPICAL at 08:10

## 2023-10-13 RX ADMIN — DIPHENHYDRAMINE HYDROCHLORIDE 25 MG: 25 CAPSULE ORAL at 12:10

## 2023-10-13 RX ADMIN — TACROLIMUS 4 MG: 1 CAPSULE ORAL at 05:10

## 2023-10-13 RX ADMIN — FAMOTIDINE 20 MG: 20 TABLET ORAL at 08:10

## 2023-10-13 RX ADMIN — SUGAMMADEX 300 MG: 100 INJECTION, SOLUTION INTRAVENOUS at 02:10

## 2023-10-13 RX ADMIN — TACROLIMUS 3 MG: 1 CAPSULE ORAL at 08:10

## 2023-10-13 RX ADMIN — FENTANYL CITRATE 50 MCG: 50 INJECTION INTRAMUSCULAR; INTRAVENOUS at 01:10

## 2023-10-13 RX ADMIN — INSULIN ASPART 2 UNITS: 100 INJECTION, SOLUTION INTRAVENOUS; SUBCUTANEOUS at 08:10

## 2023-10-13 RX ADMIN — CALCIUM CHLORIDE 500 MG: 100 INJECTION, SOLUTION INTRAVENOUS at 01:10

## 2023-10-13 RX ADMIN — FENTANYL CITRATE 50 MCG: 50 INJECTION INTRAMUSCULAR; INTRAVENOUS at 02:10

## 2023-10-13 RX ADMIN — ROCURONIUM BROMIDE 20 MG: 10 INJECTION INTRAVENOUS at 02:10

## 2023-10-13 RX ADMIN — ACETAMINOPHEN 650 MG: 325 TABLET ORAL at 12:10

## 2023-10-13 RX ADMIN — OXYCODONE HYDROCHLORIDE 5 MG: 5 TABLET ORAL at 03:10

## 2023-10-13 RX ADMIN — DEXMEDETOMIDINE 12 MCG: 200 INJECTION, SOLUTION INTRAVENOUS at 02:10

## 2023-10-13 RX ADMIN — LIDOCAINE HYDROCHLORIDE 100 MG: 20 INJECTION, SOLUTION EPIDURAL; INFILTRATION; INTRACAUDAL at 01:10

## 2023-10-13 RX ADMIN — CEFAZOLIN 2 G: 330 INJECTION, POWDER, FOR SOLUTION INTRAMUSCULAR; INTRAVENOUS at 01:10

## 2023-10-13 RX ADMIN — THERA TABS 1 TABLET: TAB at 08:10

## 2023-10-13 RX ADMIN — FUROSEMIDE 100 MG: 10 INJECTION, SOLUTION INTRAMUSCULAR; INTRAVENOUS at 02:10

## 2023-10-13 RX ADMIN — ATORVASTATIN CALCIUM 10 MG: 10 TABLET, FILM COATED ORAL at 08:10

## 2023-10-13 RX ADMIN — BISACODYL 10 MG: 5 TABLET, COATED ORAL at 08:10

## 2023-10-13 RX ADMIN — CEFAZOLIN 2 G: 2 INJECTION, POWDER, FOR SOLUTION INTRAMUSCULAR; INTRAVENOUS at 10:10

## 2023-10-13 RX ADMIN — ANTI-THYMOCYTE GLOBULIN (RABBIT) 125 MG: 5 INJECTION, POWDER, LYOPHILIZED, FOR SOLUTION INTRAVENOUS at 01:10

## 2023-10-13 RX ADMIN — SODIUM CHLORIDE: 9 INJECTION, SOLUTION INTRAVENOUS at 08:10

## 2023-10-13 RX ADMIN — SODIUM CHLORIDE: 9 INJECTION, SOLUTION INTRAVENOUS at 05:10

## 2023-10-13 RX ADMIN — SODIUM CHLORIDE, SODIUM GLUCONATE, SODIUM ACETATE, POTASSIUM CHLORIDE, MAGNESIUM CHLORIDE, SODIUM PHOSPHATE, DIBASIC, AND POTASSIUM PHOSPHATE: .53; .5; .37; .037; .03; .012; .00082 INJECTION, SOLUTION INTRAVENOUS at 02:10

## 2023-10-13 RX ADMIN — CALCIUM GLUCONATE 1 G: 20 INJECTION, SOLUTION INTRAVENOUS at 10:10

## 2023-10-13 RX ADMIN — SODIUM BICARBONATE: 84 INJECTION, SOLUTION INTRAVENOUS at 05:10

## 2023-10-13 RX ADMIN — DOCUSATE SODIUM 100 MG: 100 CAPSULE, LIQUID FILLED ORAL at 03:10

## 2023-10-13 RX ADMIN — CALCIUM CHLORIDE 500 MG: 100 INJECTION, SOLUTION INTRAVENOUS at 02:10

## 2023-10-13 RX ADMIN — Medication 100 MCG: at 01:10

## 2023-10-13 RX ADMIN — ONDANSETRON 4 MG: 2 INJECTION INTRAMUSCULAR; INTRAVENOUS at 01:10

## 2023-10-13 RX ADMIN — PROPOFOL 200 MG: 10 INJECTION, EMULSION INTRAVENOUS at 01:10

## 2023-10-13 RX ADMIN — SODIUM CHLORIDE 500 ML: 9 INJECTION, SOLUTION INTRAVENOUS at 11:10

## 2023-10-13 NOTE — PLAN OF CARE
Recommendations     1. Modify diet to renal 2/2 current lab values (Creatinine 11.1 md/dL, GFR 4.8, BUN 75 mg/dL, and Phos 5.9 mg/dL). Add diabetic (2000 kcal) diet restrictions if needed.   2. If PO intake <50%, add Novasource BID.  3. RD following.     Goals: Will meet % EEN/EPN by next RD f/u.  Nutrition Goal Status: new  Communication of RD Recs:  (POC)    Yesika Martell RDN,LDN

## 2023-10-13 NOTE — OP NOTE
Operative Report    Date of Procedure: 10/13/2023    Surgeons:  Surgeon(s) and Role:     * Cuauhtemoc Morales MD - Primary     * Connie Escobar MD - Fellow    First Assistant Attestation:  The presence of an additional attending surgeon functioning as first assistant was required due to the complexity of the procedure relative to any available residents. I certify that no resident was available who was qualified to serve as first assistant. Duties performed by the assistant included assisting the primary surgeon.    Pre-operative Diagnosis: Acute decrease in urine output  Post-operative Diagnosis: Yesika kidney (subcapsular hematoma with compression)    Procedure(s) Perfomed: Exploration of kidney transplant, evacuation of hematoma, release of subcapsular hematoma  Anesthesia: General endotracheal  Estimated Blood Loss: 200 mL  Blood Products Administered: None  Fluids Administered: 2000 mL  Findings: Extensive subcapsular hematoma with compression of kidney parenchyma, perfusion improved with evacuation, main renal artery and vein patent  Specimens: None  Drains: 19f Leobardo drains x 1    Preamble  Indications and Patient Counseling: The procedure was thoroughly explained to the patient and/or family members as appropriate, including potential risks, complications, and alternatives.  The risks associated with not undergoing the proposed procedure were also discussed.  All questions were answered, and the patient and/or family members voiced understanding and agreed to proceed with the operation.    Time-Out: A complete time out was carried out prior to incision, with confirmation of patient identity, correct procedure, correct operative site, appropriate antibiotic prophylaxis, review of any known allergies, and presence of all needed equipment.    Procedure in Detail  Following the induction of general endotracheal anesthesia, appropriate arterial and venous lines were placed by the anesthesia team.  Care was taken to pad  all pressure points and avoid any potential traction injuries from positioning. The urinary bladder was catheterized.  Sequential compression boots and Ron Huggers were used. The abdomen was sterilely prepped and draped.    The abdomen was entered by re-opening the kidney transplant incision. The kidney was dark and firm with evident subcapsular hematoma. Bookwalter retractor was placed for exposure. The capsule of the kidney was opened and all clot evacuated. The kidney perfusion immediately improved with brisk uptrend in urine production. The field was inspected for hemostasis, hemostatic agents applied and packed off for a 15 minute break period. After there was no evidence of significant active bleeding and the kidney continued to appear well perfused.      A final check for hemostasis was made.  1 19f Leobardo drains were placed through separate incision. The cavity was irrigated with saline. The fascia was closed with #1 PDS suture. The incision was irrigated and the skin was closed with 4-0 monocryl. At the end of the case all instrument, needle, and sponge counts were correct. The patient was taken from the OR in stable condition. An US was performed in the PACU that demonstrated good perfusion.

## 2023-10-13 NOTE — CONSULTS
[facility] Step[facility]  Endocrinology  Diabetes Consult Note    Consult Requested by: Cuauhtemoc Morales MD   Reason for admit: S/P kidney transplant    HISTORY OF PRESENT ILLNESS:  Reason for Consult: Management of T2DM, Hyperglycemia     Surgical Procedure and Date: s/p kidney transplant on 10/12/2023    Diabetes diagnosis year: 2009    Home Diabetes Medications:  Tresiba (has not taken in over a year)    How often checking glucose at home? 1-3 x day   BG readings on regimen: 80's in the AM and 130's at lunch time.   Hypoglycemia on the regimen?  No  Missed doses on regimen?  No    Diabetes Complications include:     Hyperglycemia, Diabetic nephropathy  , and Diabetic chronic kidney disease         Complicating diabetes co morbidities:   Glucocorticoid use       HPI: Mr. Hernandez is a 62 y.o. year old White male who has been approved to receive a living related kidney transplant.  He has advanced kidney disease secondary to diabetic nephropathy and IgA nephropathy. Patient is on peritoneal dialysis. He received a LRRT 10/12/23 from his niece for IgA nephropathy and DMII. Endocrine consulted to manage hyperglycemia and type 2 diabetes.     Hemoglobin A1C   Date Value Ref Range Status   10/12/2023 5.1 4.0 - 5.6 % Final     Comment:     ADA Screening Guidelines:  5.7-6.4%  Consistent with prediabetes  >or=6.5%  Consistent with diabetes    High levels of fetal hemoglobin interfere with the HbA1C  assay. Heterozygous hemoglobin variants (HbS, HgC, etc)do  not significantly interfere with this assay.   However, presence of multiple variants may affect accuracy.     10/06/2021 6.6 (H) 4.0 - 5.6 % Final     Comment:     ADA Screening Guidelines:  5.7-6.4%  Consistent with prediabetes  >or=6.5%  Consistent with diabetes    High levels of fetal hemoglobin interfere with the HbA1C  assay. Heterozygous hemoglobin variants (HbS, HgC, etc)do  not significantly interfere with this assay.   However, presence of multiple  variants may affect accuracy.                      Interval HPI:   Overnight events: No acute events overnight. Patient in room 78254/00020 A. Blood glucose stable. BG at goal on current insulin regimen (SSI ). Steroid use- Methylprednisolone  250 mg. Day of Surgery  Renal function- Abnormal - Creatinine 9.4   Vasopressors-  None       Endocrine will continue to follow and manage insulin orders inpatient.         Diet Adult Regular (IDDSI Level 7) Standard Tray     Eatin%  Nausea: No  Hypoglycemia and intervention: No  Fever: No  TPN and/or TF: No    PMH, PSH, FH, SH updated and reviewed     ROS:  Review of Systems  Constitutional: Negative for weight changes.  Eyes: Negative for visual disturbance.  Respiratory: Negative for cough.   Cardiovascular: Negative for chest pain.  Gastrointestinal: Negative for nausea.  Endocrine: Negative for polyuria, polydipsia.  Musculoskeletal: Negative for back pain.  Skin: Negative for rash.  Neurological: Negative for syncope.  Psychiatric/Behavioral: Negative for depression.    Current Medications and/or Treatments Impacting Glycemic Control  Immunotherapy:    Immunosuppressants           Stop Route Frequency     tacrolimus capsule 4 mg         -- Oral 2 times daily     antithymocyte globulin (rabbit) 125 mg, hydrocortisone sodium succinate (SOLU-CORTEF) 20 mg in sodium chloride 0.9% 500 mL (FOR PERIPHERAL LINE ADMINISTRATION ONLY)         10/15/23 0859 IV Daily     mycophenolate capsule 1,000 mg         -- Oral 2 times daily          Steroids:   Hormones (From admission, onward)      Start     Stop Route Frequency Ordered    10/15/23 0900  predniSONE tablet 20 mg  (IP TXP KIDNEY POST-OP THYMO WITH PERIPHERAL PRECHECKED)         -- Oral Daily 10/12/23 1346    10/14/23 0800  methylPREDNISolone sodium succinate injection 125 mg  (IP TXP KIDNEY POST-OP THYMO WITH PERIPHERAL PRECHECKED)         -- IV Once 10/12/23 1346    10/13/23 0900  antithymocyte globulin (rabbit) 125 mg,  hydrocortisone sodium succinate (SOLU-CORTEF) 20 mg in sodium chloride 0.9% 500 mL (FOR PERIPHERAL LINE ADMINISTRATION ONLY)  (IP TXP KIDNEY POST-OP THYMO WITH PERIPHERAL PRECHECKED)         10/15/23 0859 IV Daily 10/12/23 1346    10/12/23 1434  hydrocortisone sodium succinate injection 100 mg  (IP TXP KIDNEY POST-OP THYMO WITH PERIPHERAL PRECHECKED)         03/10/35 0534 IV Once as needed 10/12/23 1346          Pressors:    Autonomic Drugs (From admission, onward)      Start     Stop Route Frequency Ordered    10/12/23 1434  EPINEPHrine (PF) injection 1 mg  (IP TXP KIDNEY POST-OP THYMO WITH PERIPHERAL PRECHECKED)         03/10/35 0534 SubQ Once as needed 10/12/23 1346          Hyperglycemia/Diabetes Medications:   Antihyperglycemics (From admission, onward)      Start     Stop Route Frequency Ordered    10/12/23 1432  insulin aspart U-100 pen 0-5 Units  (INSULIN - LOW CORRECTION DOSE (Recommended for BMI <25, GFR<15 or on dialysis, Age > 70, type 1 diabetes, ESLD, severe CHF or patients on <70 units of insulin daily))         -- SubQ Before meals & nightly PRN 10/12/23 1346             PHYSICAL EXAMINATION:  Vitals:    10/13/23 1542   BP:    Pulse: 79   Resp:    Temp:      Body mass index is 25.81 kg/m².     Physical Exam     Constitutional: Well developed, well nourished, NAD.  ENT: External ears no masses with nose patent; normal hearing.  Neck: Supple; trachea midline.  Cardiovascular: Normal heart sounds, no LE edema. DP +2 bilaterally.  Lungs: Normal effort; lungs anterior bilaterally clear to auscultation.  Abdomen: Soft, no masses, no hernias.  MS: No clubbing or cyanosis of nails noted; unable to assess gait.  Skin: No rashes, lesions, or ulcers; no nodules. Injection sites are ok. No lipo hypertropthy or atrophy.  Psychiatric: Good judgement and insight; normal mood and affect.  Neurological: Cranial nerves are grossly intact.   Foot: Nails in good condition, no amputations noted.        Labs Reviewed and  "Include   Recent Labs   Lab 10/13/23  1253   *   CALCIUM 7.5*   ALBUMIN 2.1*      K 4.3   CO2 21*      BUN 70*   CREATININE 9.4*     Lab Results   Component Value Date    WBC 10.13 10/13/2023    HGB 6.5 (L) 10/13/2023    HCT 22.8 (L) 10/13/2023    MCV 91 10/13/2023     10/13/2023     No results for input(s): "TSH", "FREET4" in the last 168 hours.  Lab Results   Component Value Date    HGBA1C 5.1 10/12/2023       Nutritional status:   Body mass index is 25.81 kg/m².  Lab Results   Component Value Date    ALBUMIN 2.1 (L) 10/13/2023    ALBUMIN 2.2 (L) 10/13/2023    ALBUMIN 2.6 (L) 10/12/2023     No results found for: "PREALBUMIN"    Estimated Creatinine Clearance: 8.5 mL/min (A) (based on SCr of 9.4 mg/dL (H)).    Accu-Checks  Recent Labs     10/12/23  1410 10/12/23  1623 10/12/23  1654 10/12/23  2126 10/13/23  0320 10/13/23  0833 10/13/23  1628   POCTGLUCOSE 121* 214* 186* 169* 170* 174* 193*        ASSESSMENT and PLAN    Cardiac/Vascular  Benign hypertension  Uncontrolled HTN can worsen insulin resistance.       Renal/  * S/P kidney transplant  Optimize BG control to improve wound healing  Titrate insulin slowly to avoid hypoglycemia as the risk of hypoglycemia increases with decreased creatinine clearance.    Estimated Creatinine Clearance: 8.5 mL/min (A) (based on SCr of 9.4 mg/dL (H)).        Endocrine  Type 2 diabetes mellitus with renal complication  Endocrinology consulted for BG management.   BG goal 140-180    - Novolog (Insulin Aspart) prn for BG excursions MDC SSI (150/25)  - BG checks AC/HS  - Hypoglycemia protocol in place    ** Please notify Endocrine for any change and/or advance in diet**  ** Please call Endocrine for any BG related issues **    Discharge Planning:   TBD. Please notify endocrinology prior to discharge.        Palliative Care  Long-term use of immunosuppressant medication  Glucocorticoids markedly increase glucose levels. Expect the steroid taper will help " glucose control.             Plan discussed with patient, family, and RN at bedside.        Milind Vigil, DNP, FNP  Endocrinology  [facility] - Transplant Stepdown

## 2023-10-13 NOTE — SUBJECTIVE & OBJECTIVE
Subjective:   History of Present Illness:  Mr. Hernandez is a 62 y.o. year old White male who has been approved to receive a living related kidney transplant.  He has advanced kidney disease secondary to diabetic nephropathy and IgA nephropathy. Patient is on peritoneal dialysis.         Hospital Course:  Mr. Hernandez is a 64 yo man who received a LRRT 10/12/23 from his niece for IgA nephropathy and DMII. PD cath removed as well. Surgery went well, 2 day herrmann, no drain. Post op labs stable, K a little elevated at 5.2 so shifted and given lasix with improvement in K on next lab check. Was making great UOP immediately post op.     Interval History   ON UOP suddenly dropped from ~200ml/hr to ~50ml/hr. Repeat labs showed a previously down trending Cr elevated. Decision was made for Class A takeback. During exlap, subcapsular hematoma found causing significant compression of kidney parenchyma. Once decompressed, kidney picked up and started making more UOP again (~500ml in first hour). Drain placed (dark colored but thin from hemostasis products). Post op US stable. UOP remains ~200ml/hr today. PACU, AM, and noon labs with Cr downtrending. Hgb decreased from 8.1 to 6.8 since takeback, VSS and no overt s/s of bleeding, 1 unit blood transfused. Patient overall feeling well today. Pain well controlled, eating and drinking well, passing some flatus. Cont to monitor closely. Dressing to be removed tomorrow, [place] herrmann Sunday morning.         Past Medical, Surgical, Family, and Social History:   Unchanged from H&P.    Scheduled Meds:   acetaminophen  650 mg Oral Q8H    acetaminophen  650 mg Oral Q24H    antithymocyte globulin (rabbit) 125 mg, hydrocortisone sodium succinate (SOLU-CORTEF) 20 mg in sodium chloride 0.9% 500 mL (FOR PERIPHERAL LINE ADMINISTRATION ONLY)  1.5 mg/kg (Adjusted) Intravenous Daily    atorvastatin  10 mg Oral Daily    bisacodyL  10 mg Oral QHS    diphenhydrAMINE  25 mg Oral Q24H    docusate sodium  100 mg  Oral TID    famotidine  20 mg Oral QHS    [START ON 10/14/2023] heparin (porcine)  5,000 Units Subcutaneous Q8H    [START ON 10/14/2023] methylPREDNISolone sodium succinate injection  125 mg Intravenous Once    multivitamin  1 tablet Oral Daily    mupirocin  1 g Nasal BID    mycophenolate  1,000 mg Oral BID    [START ON 10/15/2023] predniSONE  20 mg Oral Daily    [START ON 10/22/2023] sulfamethoxazole-trimethoprim 400-80mg  1 tablet Oral Daily AM    tacrolimus  4 mg Oral BID    [START ON 10/22/2023] valGANciclovir  450 mg Oral Daily AM     Continuous Infusions:   glucagon (human recombinant)      sodium bicarbonate 75 mEq in dextrose 5 % and 0.45 % NaCl 1,000 mL infusion 100 mL/hr at 10/13/23 1026     PRN Meds:0.9%  NaCl infusion (for blood administration), dextrose 10%, diphenhydrAMINE, EPINEPHrine (PF), glucagon (human recombinant), glucose, glucose, glucose, hydrALAZINE, hydrocortisone sodium succinate, insulin aspart U-100, labetalol, ondansetron, oxyCODONE, sodium chloride 0.9%, sodium chloride 0.9%, traMADoL    Intake/Output - Last 3 Shifts         10/11 0700  10/12 0659 10/12 0700  10/13 0659 10/13 0700  10/14 0659    P.O.  320     I.V. (mL/kg)  3356.3 (41.1)     Other  0     IV Piggyback  6379.6     Total Intake(mL/kg)  86758.9 (123.2)     Urine (mL/kg/hr)  5600 (2.9) 1275 (1.8)    Emesis/NG output  0     Drains  125 220    Other  0     Stool  0     Blood  0     Total Output  5725 1495    Net  +4330.9 -1495           Urine Occurrence  0 x     Stool Occurrence  0 x     Emesis Occurrence  0 x              Review of Systems   Constitutional:  Negative for appetite change, fatigue and fever.   HENT:  Negative for trouble swallowing.    Respiratory:  Negative for cough and shortness of breath.    Cardiovascular:  Negative for chest pain, palpitations and leg swelling.   Gastrointestinal:  Positive for abdominal pain (incisional). Negative for abdominal distention, diarrhea, nausea and vomiting.   Genitourinary:   "Negative for decreased urine volume and dysuria.        Pickering   Skin:  Positive for wound.   Allergic/Immunologic: Positive for immunocompromised state.   Neurological:  Negative for dizziness and weakness.   Psychiatric/Behavioral:  Negative for confusion.       Objective:     Vital Signs (Most Recent):  Temp: 97.8 °F (36.6 °C) (10/13/23 1349)  Pulse: 79 (10/13/23 1542)  Resp: 20 (10/13/23 0613)  BP: 134/62 (10/13/23 1530)  SpO2: 95 % (10/13/23 1530) Vital Signs (24h Range):  Temp:  [97.8 °F (36.6 °C)-100 °F (37.8 °C)] 97.8 °F (36.6 °C)  Pulse:  [] 79  Resp:  [10-23] 20  SpO2:  [94 %-100 %] 95 %  BP: ()/(52-78) 134/62     Weight: 81.6 kg (179 lb 14.3 oz)  Height: 5' 10" (177.8 cm)  Body mass index is 25.81 kg/m².     Physical Exam  Vitals and nursing note reviewed.   Constitutional:       General: He is not in acute distress.  HENT:      Head: Normocephalic.      Mouth/Throat:      Mouth: Mucous membranes are moist.   Eyes:      Extraocular Movements: Extraocular movements intact.      Conjunctiva/sclera: Conjunctivae normal.      Pupils: Pupils are equal, round, and reactive to light.   Cardiovascular:      Rate and Rhythm: Normal rate and regular rhythm.      Heart sounds: Normal heart sounds. No murmur heard.  Pulmonary:      Effort: Pulmonary effort is normal. No respiratory distress.      Breath sounds: Normal breath sounds.   Abdominal:      General: Bowel sounds are normal. There is no distension.      Palpations: Abdomen is soft.      Tenderness: There is abdominal tenderness (incisional, expected post op). There is no guarding or rebound.      Comments: Incision closed with dermabond, CDI, no SSI or leaking   Musculoskeletal:         General: No swelling. Normal range of motion.      Cervical back: Normal range of motion.   Skin:     General: Skin is warm and dry.   Neurological:      Mental Status: He is alert and oriented to person, place, and time.      Motor: No weakness.   Psychiatric:    "      Mood and Affect: Mood normal.         Behavior: Behavior normal.         Thought Content: Thought content normal.         Judgment: Judgment normal.          Laboratory:  CBC:   Recent Labs   Lab 10/12/23  2142 10/13/23  0322 10/13/23  0612 10/13/23  1253   WBC 10.15 11.25 10.13  --    RBC 3.02* 2.66* 2.50*  --    HGB 9.0* 8.1* 7.6* 6.5*   HCT 27.6* 24.5* 22.8*  --     171 152  --    MCV 91 92 91  --    MCH 29.8 30.5 30.4  --    MCHC 32.6 33.1 33.3  --      CMP:   Recent Labs   Lab 10/12/23  2142 10/13/23  0322 10/13/23  0612 10/13/23  1253   * 162* 187* 185*   CALCIUM 7.2* 7.5* 6.9* 7.5*   ALBUMIN 2.6*  --  2.2* 2.1*    136 137 138   K 4.3 5.1 4.6 4.3   CO2 17* 17* 18* 21*    108 109 108   BUN 91* 80* 75* 70*   CREATININE 14.0* 12.9* 11.1* 9.4*     Labs within the past 24 hours have been reviewed.    Diagnostic Results:  US - Kidney: Results for orders placed during the hospital encounter of 10/12/23    US Transplant Kidney With Doppler    Narrative  EXAMINATION:  US TRANSPLANT KIDNEY WITH DOPPLER    CLINICAL HISTORY:  living donor kidney recipient requiring takeback for subcapsular hematoma evacuation;    TECHNIQUE:  Real time gray scale and doppler ultrasound was performed over the patient's renal allograft.    COMPARISON:  None    FINDINGS:  Status post renal transplant on 10/12/2023, with take back to the OR for drainage of a subcapsular hematoma on 10/13/2023 immediately prior to exam.    Renal allograft in the right lower quadrant.  The upper pole is not well visualized due to hemostatic products used in the OR.  Normal perfusion.  No hydronephrosis.  No fluid collections are apparent sonographically.  The bladder is not well visualized.    Vasculature:    Resistive indexes:    *Interlobar: 0.76    *Segmental upper: 0.78    *Segmental middle: 0.83    *Segmental lower: 0.79    Main renal artery peak systolic velocity: 223 cm/sec with normal waveform.    Renal artery/iliac ratio:  1.6.    The main renal vein is patent.    Impression  Satisfactory gray scale and doppler evaluation of renal allograft.    Electronically signed by resident: Chikis Crabtree  Date:    10/13/2023  Time:    05:53    Electronically signed by: Aiden Wells  Date:    10/13/2023  Time:    06:21

## 2023-10-13 NOTE — HPI
Reason for Consult: Management of T2DM, Hyperglycemia     Surgical Procedure and Date: s/p kidney transplant on 10/12/2023    Diabetes diagnosis year: 2009    Home Diabetes Medications:  Tresiba (has not taken in over a year)    How often checking glucose at home? 1-3 x day   BG readings on regimen: 80's in the AM and 130's at lunch time.   Hypoglycemia on the regimen?  No  Missed doses on regimen?  No    Diabetes Complications include:     Hyperglycemia, Diabetic nephropathy  , and Diabetic chronic kidney disease         Complicating diabetes co morbidities:   Glucocorticoid use       HPI: Mr. Hernandez is a 62 y.o. year old White male who has been approved to receive a living related kidney transplant.  He has advanced kidney disease secondary to diabetic nephropathy and IgA nephropathy. Patient is on peritoneal dialysis. He received a LRRT 10/12/23 from his niece for IgA nephropathy and DMII. Endocrine consulted to manage hyperglycemia and type 2 diabetes.     Hemoglobin A1C   Date Value Ref Range Status   10/12/2023 5.1 4.0 - 5.6 % Final     Comment:     ADA Screening Guidelines:  5.7-6.4%  Consistent with prediabetes  >or=6.5%  Consistent with diabetes    High levels of fetal hemoglobin interfere with the HbA1C  assay. Heterozygous hemoglobin variants (HbS, HgC, etc)do  not significantly interfere with this assay.   However, presence of multiple variants may affect accuracy.     10/06/2021 6.6 (H) 4.0 - 5.6 % Final     Comment:     ADA Screening Guidelines:  5.7-6.4%  Consistent with prediabetes  >or=6.5%  Consistent with diabetes    High levels of fetal hemoglobin interfere with the HbA1C  assay. Heterozygous hemoglobin variants (HbS, HgC, etc)do  not significantly interfere with this assay.   However, presence of multiple variants may affect accuracy.

## 2023-10-13 NOTE — SIGNIFICANT EVENT
Shortly after 8pm, nurse reported uop had decreased to 50 ml/hr, previously ~150-200ml/hr. Patient with very little PO intake. 500ml bolus ordered, and labs scheduled for 9pm. Previously was shifted and given lasix around 5pm. Uop without improvement with bolus. STAT Kidney US ordered. Uop decreased to 30 ml/hr. Patient assessed multiple times with stable assessment, minimal complaints of pain. Abd soft, non-distended. Labs showed increasing Cr to 14.0 from 13.4, H/H 9.0/27.6 (previously hgb 11.9 and Hct 30.5). Dr. Morales updated with situation, decision made to cancel US and proceed with class A for ex lap. Surgery desk and anesthesia notified. Dr. Morales to bedside to speak with patient.       Critical care time spent on the evaluation and treatment of severe organ dysfunction, review of pertinent labs and imaging studies, discussions with consulting providers and discussions with patient/family: 60 minutes.

## 2023-10-13 NOTE — HOSPITAL COURSE
Mr. Hernandez is a 64 yo man who received a LRRT 10/12/23 from his niece for IgA nephropathy and DMII. PD cath removed as well. Surgery went well, 2 day herrmann, no drain. Post op labs stable, K a little elevated at 5.2 so shifted and given lasix with improvement in K on next lab check. Was making great UOP immediately post op.  On POD1 early AM, had acute drop in UOP.  Taken back to OR 10/13 and found to have extensive subcapsular hematoma causing Yesika kidney.  After decompression, UOP and kidney function improving.     Interval Hx: NAEON. H/H down trending. Transfused 1u prbc. Kidney US showing 9.7 x 2.5 x 5.4 cm collection. Kidney function continues to improve. Making good urine. He is overall doing well. HDS. Ambulating well. Electrolytes replaced. Tentative d/c tomorrow pending stability of blood counts.

## 2023-10-13 NOTE — NURSING TRANSFER
Nursing Transfer Note      10/13/2023   4:15 AM    Nurse giving handoff:victor hugo sin  Nurse receiving handoff:victor hugo emmanuel    Reason patient is being transferred: post procedure    Transfer To: [id number]    Transfer via bed    Transfer with cardiac monitoring    Transported by RN    Transfer Vital Signs: see flowsheets    Telemetry: Rhythm normal sinus  Order for Tele Monitor? Yes    Additional Lines: Pickering Catheter    4eyes on Skin: yes    Medicines sent: n/a    Any special needs or follow-up needed: routine    Patient belongings transferred with patient: No    Chart send with patient: Yes    Notified: spouse    Patient reassessed at: 10/13/23 415

## 2023-10-13 NOTE — ASSESSMENT & PLAN NOTE
"- 2/2 transplant and takeback   - expected post op   - 1 unit 10/13   - keep T$S up to date   - see "anemia chronic disease"  "

## 2023-10-13 NOTE — NURSING
Pt transported to ex lap by OR team. Sodium bicarb @50 cc/hr infusing. Chart with pt. Consents in chart. Family updated.

## 2023-10-13 NOTE — PLAN OF CARE
Problem: Adult Inpatient Plan of Care  Goal: Plan of Care Review  Outcome: Ongoing, Progressing     Problem: Adult Inpatient Plan of Care  Goal: Patient-Specific Goal (Individualized)  Outcome: Ongoing, Progressing     Problem: Adjustment to Transplant (Kidney Transplant)  Goal: Optimal Coping with Organ Transplant  Outcome: Ongoing, Progressing     Problem: Donor Organ Function (Kidney Transplant)  Goal: Effective Renal Function  Outcome: Ongoing, Progressing    Patient acknowledges understanding with pain scale. Appropriate pain medications can be dispensed.  Non slip footwear in place. Bed lowered. Rails up x 2. Call bell in easy reach.  Pt is afebrile. Hand washing per nursing guidelines.   Repeat H/h dropped 6.5/22.8, 1uprbc given. KEYA dark SS drainage noted and expected.  cc bolus. Pickering in placed, clear yellow urine noted, Q1H uop this shift, urine amount decreases each hour.   Discontinued bp meds due to hypotension.  Ca 6.9, replaced.     Self meds taught to wife and afternoon med pulled successfully.   Kidney incision natalie with dermabond with 2 lap sites natalie. No S/S of infection noted. No bm, bowel sounds active.

## 2023-10-13 NOTE — PROGRESS NOTES
TRANSPLANT NOTE:      ORGAN: LEFT KIDNEY    Disease Etiology: Diabetes Mellitus - Type II  Donor Type: Living    [facility] High Risk: No    Donor CMV Status: Positive  Donor HBcAB: Negative  Donor HCV Status: Negative  Peak cPRA % (within 1 year of transplant): 20      Heath Hernandez is a 63 y.o. male s/p  Living   kidney transplant on 10/12/2023 (Kidney) for Diabetes Mellitus - Type II.   This patient will receive 3 doses of Thymoglobulin for induction.  This patients maintenance immunosuppression will include a steroid taper per protocol to 5mg daily, Prograf, and Cellcept maintenance.  Opportunistic infection prophylaxis will include Valcyte for 3 months (CMV D + , R + ) and Bactrim for 6 months.  Patient is to begin self medications upon transfer to the [facility], and I plan to meet with this patient and his/her support person prior to discharge to review the medication section of the Kidney Transplant Education Manual.  I have reviewed the pre-op medications and have restarted those, as appropriate.

## 2023-10-13 NOTE — NURSING
Pt returned back to [id number] from PACU. VSS. KEYA drain color reviewed with NP BRITTANY Alcantara and Dr. Gant. Plan to continue to monitor q2h. DONTRELL/SCD on. Telemetry monitor in place (NSR). SQ heparin d/c. Extra dose of ancef added for 1000. Pt is NPO. I=O. Sodium bicarb @50cc/hr and NS based on urine output is infusing. Labs from PACU reviewed with NP. Morning labs @0600. US done in PACU. Family and pt updated on plan of care. Will continue to monitor.

## 2023-10-13 NOTE — PROGRESS NOTES
[facility]  Kidney Transplant  Progress Note      Reason for Follow-up: Reassessment of Kidney Transplant - 10/12/2023  (#1) recipient and management of immunosuppression.    ORGAN: LEFT KIDNEY    Donor Type: Living    Donor CMV Status:   Donor HBcAB:  Donor HCV Status:  Donor HBV IKE:   Donor HCV IKE:       Subjective:   History of Present Illness:  Mr. Hernandez is a 62 y.o. year old White male who has been approved to receive a living related kidney transplant.  He has advanced kidney disease secondary to diabetic nephropathy and IgA nephropathy. Patient is on peritoneal dialysis.         Hospital Course:  Mr. Hernandez is a 62 yo man who received a LRRT 10/12/23 from his niece for IgA nephropathy and DMII. PD cath removed as well. Surgery went well, 2 day herrmann, no drain. Post op labs stable, K a little elevated at 5.2 so shifted and given lasix with improvement in K on next lab check. Was making great UOP immediately post op.     Interval History   ON UOP suddenly dropped from ~200ml/hr to ~50ml/hr. Repeat labs showed a previously down trending Cr elevated. Decision was made for Class A takeback. During exlap, subcapsular hematoma found causing significant compression of kidney parenchyma. Once decompressed, kidney picked up and started making more UOP again (~500ml in first hour). Drain placed (dark colored but thin from hemostasis products). Post op US stable. UOP remains ~200ml/hr today. PACU, AM, and noon labs with Cr downtrending. Hgb decreased from 8.1 to 6.8 since takeback, VSS and no overt s/s of bleeding, 1 unit blood transfused. Patient overall feeling well today. Pain well controlled, eating and drinking well, passing some flatus. Cont to monitor closely. Dressing to be removed tomorrow, lindy herrmann Sunday morning.         Past Medical, Surgical, Family, and Social History:   Unchanged from H&P.    Scheduled Meds:   acetaminophen  650 mg Oral Q8H    acetaminophen  650 mg Oral Q24H     antithymocyte globulin (rabbit) 125 mg, hydrocortisone sodium succinate (SOLU-CORTEF) 20 mg in sodium chloride 0.9% 500 mL (FOR PERIPHERAL LINE ADMINISTRATION ONLY)  1.5 mg/kg (Adjusted) Intravenous Daily    atorvastatin  10 mg Oral Daily    bisacodyL  10 mg Oral QHS    diphenhydrAMINE  25 mg Oral Q24H    docusate sodium  100 mg Oral TID    famotidine  20 mg Oral QHS    [START ON 10/14/2023] heparin (porcine)  5,000 Units Subcutaneous Q8H    [START ON 10/14/2023] methylPREDNISolone sodium succinate injection  125 mg Intravenous Once    multivitamin  1 tablet Oral Daily    mupirocin  1 g Nasal BID    mycophenolate  1,000 mg Oral BID    [START ON 10/15/2023] predniSONE  20 mg Oral Daily    [START ON 10/22/2023] sulfamethoxazole-trimethoprim 400-80mg  1 tablet Oral Daily AM    tacrolimus  4 mg Oral BID    [START ON 10/22/2023] valGANciclovir  450 mg Oral Daily AM     Continuous Infusions:   glucagon (human recombinant)      sodium bicarbonate 75 mEq in dextrose 5 % and 0.45 % NaCl 1,000 mL infusion 100 mL/hr at 10/13/23 1026     PRN Meds:0.9%  NaCl infusion (for blood administration), dextrose 10%, diphenhydrAMINE, EPINEPHrine (PF), glucagon (human recombinant), glucose, glucose, glucose, hydrALAZINE, hydrocortisone sodium succinate, insulin aspart U-100, labetalol, ondansetron, oxyCODONE, sodium chloride 0.9%, sodium chloride 0.9%, traMADoL    Intake/Output - Last 3 Shifts         10/11 0700  10/12 0659 10/12 0700  10/13 0659 10/13 0700  10/14 0659    P.O.  320     I.V. (mL/kg)  3356.3 (41.1)     Other  0     IV Piggyback  6379.6     Total Intake(mL/kg)  40664.9 (123.2)     Urine (mL/kg/hr)  5600 (2.9) 1275 (1.8)    Emesis/NG output  0     Drains  125 220    Other  0     Stool  0     Blood  0     Total Output  5725 1495    Net  +4330.9 -1495           Urine Occurrence  0 x     Stool Occurrence  0 x     Emesis Occurrence  0 x              Review of Systems   Constitutional:  Negative for appetite  "change, fatigue and fever.   HENT:  Negative for trouble swallowing.    Respiratory:  Negative for cough and shortness of breath.    Cardiovascular:  Negative for chest pain, palpitations and leg swelling.   Gastrointestinal:  Positive for abdominal pain (incisional). Negative for abdominal distention, diarrhea, nausea and vomiting.   Genitourinary:  Negative for decreased urine volume and dysuria.        Pickering   Skin:  Positive for wound.   Allergic/Immunologic: Positive for immunocompromised state.   Neurological:  Negative for dizziness and weakness.   Psychiatric/Behavioral:  Negative for confusion.       Objective:     Vital Signs (Most Recent):  Temp: 97.8 °F (36.6 °C) (10/13/23 1349)  Pulse: 79 (10/13/23 1542)  Resp: 20 (10/13/23 0613)  BP: 134/62 (10/13/23 1530)  SpO2: 95 % (10/13/23 1530) Vital Signs (24h Range):  Temp:  [97.8 °F (36.6 °C)-100 °F (37.8 °C)] 97.8 °F (36.6 °C)  Pulse:  [] 79  Resp:  [10-23] 20  SpO2:  [94 %-100 %] 95 %  BP: ()/(52-78) 134/62     Weight: 81.6 kg (179 lb 14.3 oz)  Height: 5' 10" (177.8 cm)  Body mass index is 25.81 kg/m².     Physical Exam  Vitals and nursing note reviewed.   Constitutional:       General: He is not in acute distress.  HENT:      Head: Normocephalic.      Mouth/Throat:      Mouth: Mucous membranes are moist.   Eyes:      Extraocular Movements: Extraocular movements intact.      Conjunctiva/sclera: Conjunctivae normal.      Pupils: Pupils are equal, round, and reactive to light.   Cardiovascular:      Rate and Rhythm: Normal rate and regular rhythm.      Heart sounds: Normal heart sounds. No murmur heard.  Pulmonary:      Effort: Pulmonary effort is normal. No respiratory distress.      Breath sounds: Normal breath sounds.   Abdominal:      General: Bowel sounds are normal. There is no distension.      Palpations: Abdomen is soft.      Tenderness: There is abdominal tenderness (incisional, expected post op). There is no guarding or rebound.      " Comments: Incision closed with dermabond, CDI, no SSI or leaking   Musculoskeletal:         General: No swelling. Normal range of motion.      Cervical back: Normal range of motion.   Skin:     General: Skin is warm and dry.   Neurological:      Mental Status: He is alert and oriented to person, place, and time.      Motor: No weakness.   Psychiatric:         Mood and Affect: Mood normal.         Behavior: Behavior normal.         Thought Content: Thought content normal.         Judgment: Judgment normal.          Laboratory:  CBC:   Recent Labs   Lab 10/12/23  2142 10/13/23  0322 10/13/23  0612 10/13/23  1253   WBC 10.15 11.25 10.13  --    RBC 3.02* 2.66* 2.50*  --    HGB 9.0* 8.1* 7.6* 6.5*   HCT 27.6* 24.5* 22.8*  --     171 152  --    MCV 91 92 91  --    MCH 29.8 30.5 30.4  --    MCHC 32.6 33.1 33.3  --      CMP:   Recent Labs   Lab 10/12/23  2142 10/13/23  0322 10/13/23  0612 10/13/23  1253   * 162* 187* 185*   CALCIUM 7.2* 7.5* 6.9* 7.5*   ALBUMIN 2.6*  --  2.2* 2.1*    136 137 138   K 4.3 5.1 4.6 4.3   CO2 17* 17* 18* 21*    108 109 108   BUN 91* 80* 75* 70*   CREATININE 14.0* 12.9* 11.1* 9.4*     Labs within the past 24 hours have been reviewed.    Diagnostic Results:  US - Kidney: Results for orders placed during the hospital encounter of 10/12/23    US Transplant Kidney With Doppler    Narrative  EXAMINATION:  US TRANSPLANT KIDNEY WITH DOPPLER    CLINICAL HISTORY:  living donor kidney recipient requiring takeback for subcapsular hematoma evacuation;    TECHNIQUE:  Real time gray scale and doppler ultrasound was performed over the patient's renal allograft.    COMPARISON:  None    FINDINGS:  Status post renal transplant on 10/12/2023, with take back to the OR for drainage of a subcapsular hematoma on 10/13/2023 immediately prior to exam.    Renal allograft in the right lower quadrant.  The upper pole is not well visualized due to hemostatic products used in the OR.  Normal  "perfusion.  No hydronephrosis.  No fluid collections are apparent sonographically.  The bladder is not well visualized.    Vasculature:    Resistive indexes:    *Interlobar: 0.76    *Segmental upper: 0.78    *Segmental middle: 0.83    *Segmental lower: 0.79    Main renal artery peak systolic velocity: 223 cm/sec with normal waveform.    Renal artery/iliac ratio: 1.6.    The main renal vein is patent.    Impression  Satisfactory gray scale and doppler evaluation of renal allograft.    Electronically signed by resident: Chikis Crabtree  Date:    10/13/2023  Time:    05:53    Electronically signed by: Aiden Wells  Date:    10/13/2023  Time:    06:21    Assessment/Plan:     * S/P kidney transplant  - LRRT 10/12/23  - surgery went well, started making great UOP, labs clearing   - UOP dropped abruptly, Cr stopped clearing   - TB for ex lab, subcapsular hematoma evacuated with return of UOP; 2 day herrmann, 1 drain  - post TB labs stable, hgb dropped to <7, 1 unit PRBCs transfused   - repeat labs 6pm   - strict Is and Os   - herrmann out Sunday AM         At risk for opportunistic infections  - valcyte for CMV, bactrim for PJP       Prophylactic immunotherapy  - tac, MMF, pred   - monitor tac levels for toxicity and therapeutic efforts       Long-term use of immunosuppressant medication  - see "prophylactic immunotherapy"      Anemia, chronic disease  - h/h decreased but stable   - monitor with CBC       Acute blood loss anemia  - 2/2 transplant and takeback   - expected post op   - 1 unit 10/13   - keep T$S up to date   - see "anemia chronic disease"    Benign hypertension  - cont home meds as appropriate with hold parameters in place       Type 2 diabetes mellitus with renal complication  - endocrine consulted, appreciate assistance           Discharge Planning:  Not yet ready for dc     Medical decision making for this encounter includes review of pertinent labs and diagnostic studies, assessment and planning, " discussions with consulting providers, discussion with patient/family, and participation in multidisciplinary rounds. Time spent caring for patient: 90 minutes    Marsha Sparks PA-C  Kidney Transplant  [facility]

## 2023-10-13 NOTE — SUBJECTIVE & OBJECTIVE
Interval HPI:   Overnight events: No acute events overnight. Patient in room 43812/12298 A. Blood glucose stable. BG at goal on current insulin regimen (SSI ). Steroid use- Methylprednisolone  250 mg. Day of Surgery  Renal function- Abnormal - Creatinine 9.4   Vasopressors-  None       Endocrine will continue to follow and manage insulin orders inpatient.         Diet Adult Regular (IDDSI Level 7) Standard Tray     Eatin%  Nausea: No  Hypoglycemia and intervention: No  Fever: No  TPN and/or TF: No    PMH, PSH, FH, SH updated and reviewed     ROS:  Review of Systems  Constitutional: Negative for weight changes.  Eyes: Negative for visual disturbance.  Respiratory: Negative for cough.   Cardiovascular: Negative for chest pain.  Gastrointestinal: Negative for nausea.  Endocrine: Negative for polyuria, polydipsia.  Musculoskeletal: Negative for back pain.  Skin: Negative for rash.  Neurological: Negative for syncope.  Psychiatric/Behavioral: Negative for depression.    Current Medications and/or Treatments Impacting Glycemic Control  Immunotherapy:    Immunosuppressants           Stop Route Frequency     tacrolimus capsule 4 mg         -- Oral 2 times daily     antithymocyte globulin (rabbit) 125 mg, hydrocortisone sodium succinate (SOLU-CORTEF) 20 mg in sodium chloride 0.9% 500 mL (FOR PERIPHERAL LINE ADMINISTRATION ONLY)         10/15/23 0859 IV Daily     mycophenolate capsule 1,000 mg         -- Oral 2 times daily          Steroids:   Hormones (From admission, onward)      Start     Stop Route Frequency Ordered    10/15/23 0900  predniSONE tablet 20 mg  (IP TXP KIDNEY POST-OP THYMO WITH PERIPHERAL PRECHECKED)         -- Oral Daily 10/12/23 1346    10/14/23 0800  methylPREDNISolone sodium succinate injection 125 mg  (IP TXP KIDNEY POST-OP THYMO WITH PERIPHERAL PRECHECKED)         -- IV Once 10/12/23 1346    10/13/23 0900  antithymocyte globulin (rabbit) 125 mg, hydrocortisone sodium succinate (SOLU-CORTEF) 20  mg in sodium chloride 0.9% 500 mL (FOR PERIPHERAL LINE ADMINISTRATION ONLY)  (IP TXP KIDNEY POST-OP THYMO WITH PERIPHERAL PRECHECKED)         10/15/23 0859 IV Daily 10/12/23 1346    10/12/23 1434  hydrocortisone sodium succinate injection 100 mg  (IP TXP KIDNEY POST-OP THYMO WITH PERIPHERAL PRECHECKED)         03/10/35 0534 IV Once as needed 10/12/23 1346          Pressors:    Autonomic Drugs (From admission, onward)      Start     Stop Route Frequency Ordered    10/12/23 1434  EPINEPHrine (PF) injection 1 mg  (IP TXP KIDNEY POST-OP THYMO WITH PERIPHERAL PRECHECKED)         03/10/35 0534 SubQ Once as needed 10/12/23 1346          Hyperglycemia/Diabetes Medications:   Antihyperglycemics (From admission, onward)      Start     Stop Route Frequency Ordered    10/12/23 1432  insulin aspart U-100 pen 0-5 Units  (INSULIN - LOW CORRECTION DOSE (Recommended for BMI <25, GFR<15 or on dialysis, Age > 70, type 1 diabetes, ESLD, severe CHF or patients on <70 units of insulin daily))         -- SubQ Before meals & nightly PRN 10/12/23 1346             PHYSICAL EXAMINATION:  Vitals:    10/13/23 1542   BP:    Pulse: 79   Resp:    Temp:      Body mass index is 25.81 kg/m².     Physical Exam     Constitutional: Well developed, well nourished, NAD.  ENT: External ears no masses with nose patent; normal hearing.  Neck: Supple; trachea midline.  Cardiovascular: Normal heart sounds, no LE edema. DP +2 bilaterally.  Lungs: Normal effort; lungs anterior bilaterally clear to auscultation.  Abdomen: Soft, no masses, no hernias.  MS: No clubbing or cyanosis of nails noted; unable to assess gait.  Skin: No rashes, lesions, or ulcers; no nodules. Injection sites are ok. No lipo hypertropthy or atrophy.  Psychiatric: Good judgement and insight; normal mood and affect.  Neurological: Cranial nerves are grossly intact.   Foot: Nails in good condition, no amputations noted.

## 2023-10-13 NOTE — CONSULTS
[facility]  Adult Nutrition  Consult Note    SUMMARY     Recommendations    1. Modify diet to renal 2/2 current lab values (Creatinine 11.1 md/dL, GFR 4.8, BUN 75 mg/dL, and Phos 5.9 mg/dL). Add diabetic (2000 kcal) diet restrictions if needed.   2. If PO intake <50%, add Novasource BID.  3. RD following.    Goals: Will meet % EEN/EPN by next RD f/u.  Nutrition Goal Status: new  Communication of RD Recs:  (POC)    Assessment and Plan    Nutrition Problem  Increased nutrient needs (protein, energy)     Related to (etiology):   Increased physiological demands     Signs and Symptoms (as evidenced by):   S/p Kidney transplant (10/13)     Interventions/Recommendations (treatment strategy):  Collaboration with other providers  Content related nutrition education     Nutrition Diagnosis Status:   New     Reason for Assessment    Reason For Assessment: consult  Diagnosis:  (s/p kidney tx)  Relevant Medical History: T2DM, HTN  Interdisciplinary Rounds: did not attend  General Information Comments: RD consulted for assess nutritional needs post kidney tx. Pt recieved tx this AM. Spoke with pt and wife at bedside. Endorses fine appetite PTA with 3 meals/day on renal diet. NPO during time of RD visit but advanced to regular after. No issues with n/v/d/c/chewing/swallwoing. # which is also CBW. No s/s of malnutrition at this time, appears well-nourished. Educated pt on food safety, food-drug interaction, and low-sodium diet. Pt verbalized understanding and states that he is familar with low sodium diet (does not seaon foods with salt at home). LBM 10/11.  Nutrition Discharge Planning: Post transplant nutrition education provided on 10/13. Food safety/drug interactions emphasized. General healthy/low salt diet recommended. Education material left at bedside. No other needs identified.    Nutrition Risk Screen    Nutrition Risk Screen: no indicators present    Nutrition/Diet History    Patient  "Reported Diet/Restrictions/Preferences: renal  Spiritual, Cultural Beliefs, Muslim Practices, Values that Affect Care: no  Food Allergies: NKFA  Factors Affecting Nutritional Intake: None identified at this time    Anthropometrics    Temp: 98.5 °F (36.9 °C)  Height Method: Stated  Height: 5' 10" (177.8 cm)  Height (inches): 70 in  Weight Method: Standard Scale  Weight: 81.6 kg (179 lb 14.3 oz)  Weight (lb): 179.9 lb  Ideal Body Weight (IBW), Male: 166 lb  % Ideal Body Weight, Male (lb): 108.37 %  BMI (Calculated): 25.8  BMI Grade: 25 - 29.9 - overweight    Lab/Procedures/Meds    Pertinent Labs Reviewed: reviewed  Pertinent Labs Comments: GFR 4.8, Glucose 187, Calcium 6.9, Phos 5.9, BUN 75, Creatinine 11.1, Albumin 2.2  Pertinent Medications Reviewed: reviewed  Pertinent Medications Comments: amlodipine, antithymocyte gobulin, atorvastatin, bisacodyl, atorvastatin, famotidine, MVI, mycophenolate, tacrolimus    Estimated/Assessed Needs    Weight Used For Calorie Calculations: 81.6 kg (179 lb 14.3 oz)  Energy Calorie Requirements (kcal): 1953 kcals  Energy Need Method: Ono-St Hungor (MSJ x 1.2 PAL)  Protein Requirements: 98 g (1.2 g/kg)  Weight Used For Protein Calculations: 81.6 kg (179 lb 14.3 oz)  Fluid Requirements (mL): 1 mL/kcal or fluid per MD  Estimated Fluid Requirement Method: RDA Method  RDA Method (mL): 1953  CHO Requirement: 245 g    Nutrition Prescription Ordered    Current Diet Order: Regular    Evaluation of Received Nutrient/Fluid Intake    I/O: +3.8 L since admit    Nutrition Risk    Level of Risk/Frequency of Follow-up:  (1-2 times/week)     Monitor and Evaluation    Food and Nutrient Intake: food and beverage intake, energy intake  Food and Nutrient Adminstration: diet order  Knowledge/Beliefs/Attitudes: beliefs and attitudes, food and nutrition knowledge/skill  Physical Activity and Function: nutrition-related ADLs and IADLs  Anthropometric Measurements: weight change, body mass index, weight, " height/length  Biochemical Data, Medical Tests and Procedures: lipid profile, inflammatory profile, glucose/endocrine profile, gastrointestinal profile, electrolyte and renal panel  Nutrition-Focused Physical Findings: overall appearance     Nutrition Follow-Up    RD Follow-up?: Yes    Yesika Martell RDN,LDN

## 2023-10-13 NOTE — ASSESSMENT & PLAN NOTE
Endocrinology consulted for BG management.   BG goal 140-180    - Novolog (Insulin Aspart) prn for BG excursions [facility] [place]150[place]25[place]  - BG checks AC/HS  - Hypoglycemia protocol in place    ** Please notify Endocrine for any change and/or advance in diet**  ** Please call Endocrine for any BG related issues **    Discharge Planning:   TBD. Please notify endocrinology prior to discharge.

## 2023-10-13 NOTE — ASSESSMENT & PLAN NOTE
Optimize BG control to improve wound healing  Titrate insulin slowly to avoid hypoglycemia as the risk of hypoglycemia increases with decreased creatinine clearance.    Estimated Creatinine Clearance: 8.5 mL/min (A) (based on SCr of 9.4 mg/dL (H)).

## 2023-10-13 NOTE — PROGRESS NOTES
Admit Note     Met with patient and son to assess needs. Patient is a 63 y.o.  male, admitted for for kidney transplant.      Patient admitted from home on 10/12/2023 .  At this time, patient presents as alert and oriented x 4, pleasant, good eye contact, well groomed, recall good, concentration/judgement good, average intelligence, calm, communicative, cooperative, and asking and answering questions appropriately.  At this time, patients caregiver presents as alert and oriented x 4, pleasant, good eye contact, well groomed, recall good, concentration/judgement good, average intelligence, calm, communicative, cooperative, and asking and answering questions appropriately.    Household/Family Systems     Patient resides with patient's wife and son, at [address] [id number].  Support system includes his wife Krystle ([phone]), and his sons Darnell ([phone]) and Hilario.  Patient does not have dependents that are need of being cared for.     Patients primary caregiver is Krystle Hernandez, patients wife, phone number [phone].  Confirmed patients contact information is [phone] (home);   Telephone Information:   Mobile [phone]   .    During admission, patient's caregiver plans to stay in patient's room.  Confirmed patient and patients caregivers do have access to reliable transportation.    Cognitive Status/Learning     Patient reports reading ability as college and states patient does not have difficulty with N/A.  Patient reports patient learns best by multisensory learning.   Needed: No.   Highest education level: Attended College/Technical School    Vocation/Disability   .  Working for Income: yes  If yes, working activity level: Working Full Time  Patient is employed with [employer]    Adherence     Patient reports a high level of adherence to patients health care regimen.  Adherence counseling and education provided. Patient verbalizes  understanding.    Substance Use    Patient reports the following substance usage.    Tobacco: none, patient denies any use.  Alcohol: none, patient denies any use.  Illicit Drugs/Non-prescribed Medications: none, patient denies any use.  Patient states clear understanding of the potential impact of substance use.  Substance abstinence/cessation counseling, education and resources provided and reviewed.     Services Utilizing/ADLS    Infusion Service: Prior to admission, patient utilizing? no  Home Health: Prior to admission, patient utilizing? no  DME: Prior to admission, no  Pulmonary/Cardiac Rehab: Prior to admission, no  Dialysis:  Prior to admission, yes Patient reports he had started PD prior to transplant.   Transplant Specialty Pharmacy:  Prior to admission, no; patient provides permission to release necessary information to specialty pharmacy for medications post-tranplant. Resources provided and patient is choosing [facility] at this time.    Prior to admission, patient reports patient was independent with ADLS and was driving.  Patient reports patient is not able to care for self at this time due to compromised medical condition (as documented in medical record) and physical weakness..  Patient indicates a willingness to care for self once medically cleared to do so.    Insurance/Medications    Insured by   Payer/Plan Subscr  Sex Relation Sub. Ins. ID Effective Group Num   1. CLARITZA HAYS 1960 Male Self [id number] 10/11/23                                    [address] [address]   2. REBEKA HAYS 1964 Female Spouse [id number]                                   [address] [address]      Primary Insurance (for UNOS reporting): Private Insurance  Secondary Insurance (for UNOS reporting): Private Insurance    Patient reports patient is able to obtain and afford medications at this time and at time of discharge.    Living Will/Healthcare Power of      Patient states patient does not have a LW and/or HCPA.   provided education regarding LW and HCPA and the completion of forms.    Coping/Mental Health    Patient is coping adequately with the aid of  family members and friends.  Patient denies mental health difficulties. Patient reports doing well at this time. Patient reports he takes Klonopin PRN. Patient denied needing or wanting mental health referrals from this  at this time. Patient and caregiver agree to contact transplant team with needs, questions, or concerns as they arise.     Discharge Planning    At time of discharge, patient plans to [place] under the care of Krystle Hernandez.  Patients wife will transport patient.  Per rounds today, expected discharge date has not been medically determined at this time. Patient and patients caregiver  verbalize understanding and are involved in treatment planning and discharge process.    Additional Concerns    Patient's caretaker denies additional needs and/or concerns at this time.  providing ongoing psychosocial support, education, resources and d/c planning as needed.  SW remains available.  remains available. Patient's caregiver verbalizes understanding and agreement with information reviewed,  availability and how to access available resources as needed. Patient denies additional needs and/or concerns at this time. Patient verbalizes understanding and agreement with information reviewed, social work availability, and how to access available resources as needed.

## 2023-10-13 NOTE — TRANSFER OF CARE
"Anesthesia Transfer of Care Note    Patient: Heath Hernandez    Procedure(s) Performed: Procedure(s) (LRB):  LAPAROTOMY, EXPLORATORY (N/A)  EXPLORATION, KIDNEY TRANSPLANT (N/A)  EVACUATION, HEMATOMA (N/A)    Patient location: PACU    Anesthesia Type: general    Transport from OR: Transported from OR on 6-10 L/min O2 by face mask with adequate spontaneous ventilation    Post pain: adequate analgesia    Post assessment: no apparent anesthetic complications and tolerated procedure well    Post vital signs: stable    Level of consciousness: awake, alert and oriented    Nausea/Vomiting: no nausea/vomiting    Complications: none    Transfer of care protocol was followed      Last vitals:   Visit Vitals  /76 (BP Location: Left arm, Patient Position: Lying)   Pulse 106   Temp 37.6 °C (99.6 °F) (Oral)   Resp 18   Ht 5' 10" (1.778 m)   Wt 81.6 kg (180 lb)   SpO2 98%   BMI 25.83 kg/m²     "

## 2023-10-13 NOTE — HPI
Mr. Hernandez is a 62 y.o. year old White male who has been approved to receive a living related kidney transplant.  He has advanced kidney disease secondary to diabetic nephropathy and IgA nephropathy. Patient is on peritoneal dialysis.

## 2023-10-13 NOTE — NURSING
NP BRITTANY Quinton to bedside to assess pt. Urine out reamins 50cc/hr. Labs in process. Hr between 104-115 bpm, temp 100 after scheduled tylenol-NP aware. NP spoke to Dr. Morales-stat us of kidney ordered.  Will continue to monitor.

## 2023-10-13 NOTE — ASSESSMENT & PLAN NOTE
- LRRT 10/12/23  - surgery went well, started making great UOP, labs clearing   - UOP dropped abruptly, Cr stopped clearing   - TB for ex lab, subcapsular hematoma evacuated with return of UOP; 2 day herrmann, 1 drain  - post TB labs stable, hgb dropped to <7, 1 unit PRBCs transfused   - repeat labs 6pm   - strict Is and Os   - herrmann out Sunday AM

## 2023-10-13 NOTE — NURSING
NP aware that pt's bp is lower (107/63 with MAP 80). KEYA drain color and amount looked at by NP-dark sanguinous. Cbc in process. Will continue to monitor.

## 2023-10-13 NOTE — ANESTHESIA PREPROCEDURE EVALUATION
[facility]  Anesthesia Pre-Operative Evaluation         Patient Name: Heath Hernandez  YOB: 1960  MRN: [id number]    SUBJECTIVE:     Pre-operative evaluation for Procedure(s) (LRB):  LAPAROTOMY, EXPLORATORY (N/A)     10/12/2023    Heath Hernandez is a 63 y.o. male w/ a significant PMHx of T2DM, HTN, and IgA nephropathy who underwent kidney tx earlier today. Pt not producing urine and Cr uptrending since sx.    Patient now presents for the above procedure(s).      LDA:        Peripheral IV - Single Lumen 10/12/23 0641 18 G Left Forearm (Active)   Site Assessment Clean;Dry;Intact 10/12/23 1920   Extremity Assessment Distal to IV No redness;No swelling;No warmth 10/12/23 1920   Line Status Infusing 10/12/23 1920   Dressing Status Clean;Intact;Dry 10/12/23 1920   Dressing Intervention Integrity maintained 10/12/23 1920   Dressing Change Due 10/16/23 10/12/23 1920   Site Change Due 10/16/23 10/12/23 1920   Reason Not Rotated Not due 10/12/23 1920   Number of days: 0            Peripheral IV - Single Lumen 10/12/23 0920 16 G Right;Posterior Hand (Active)   Site Assessment Clean;Dry;Intact 10/12/23 1920   Extremity Assessment Distal to IV No redness;No swelling;No warmth 10/12/23 1920   Line Status Flushed 10/12/23 1920   Dressing Status Clean;Dry;Intact 10/12/23 1920   Dressing Intervention Integrity maintained 10/12/23 1920   Dressing Change Due 10/16/23 10/12/23 1920   Site Change Due 10/16/23 10/12/23 1920   Reason Not Rotated Not due 10/12/23 1920   Number of days: 0            Urethral Catheter 10/12/23 0910 Triple-lumen;Silicone;Non-latex 20 Fr. (Active)   Site Assessment Clean;Intact 10/12/23 1920   Collection Container Urimeter 10/12/23 1920   Securement Method secured to top of thigh w/ adhesive device 10/12/23 1920   Catheter Care Performed yes 10/12/23 1920   Reason for Continuing Urinary Catheterization Post operative 10/12/23 1920   CAUTI Prevention Bundle Securement  "Device in place with 1" slack;Intact seal between catheter & drainage tubing;Drainage bag/urimeter off the floor;Sheeting clip in use;No dependent loops or kinks;Drainage bag/urimeter not overfilled (<2/3 full);Drainage bag/urimeter below bladder 10/12/23 1920   Output (mL) 30 mL 10/12/23 2320   Number of days: 0       Prev airway:   Intubation:     Induction:  Intravenous    Intubated:  Postinduction    Mask Ventilation:  Easy mask    Attempts:  1    Attempted By:  Staff anesthesiologist    Method of Intubation:  Direct    Blade:  Santiago 2    Laryngeal View Grade: Grade I - full view of cords      Difficult Airway Encountered?: No      Complications:  None    Airway Device:  Oral endotracheal tube    Airway Device Size:  7.5    Style/Cuff Inflation:  Cuffed    Tube secured:  22    Secured at:  The lips    Placement Verified By:  Capnometry    Complicating Factors:  None    Findings Post-Intubation:  BS equal bilateral and atraumatic/condition of teeth unchanged    Drips:    sodium chloride 0.9% Stopped (10/12/23 1859)    glucagon (human recombinant)      sodium bicarbonate 75 mEq in dextrose 5 % and 0.45 % NaCl 1,000 mL infusion 50 mL/hr at 10/12/23 2320       Patient Active Problem List   Diagnosis    Type 2 diabetes mellitus with renal complication    Benign hypertension    IgA nephropathy    Dyslipidemia    Patient on waiting list for kidney transplant    Class 1 obesity due to excess calories with serious comorbidity and body mass index (BMI) of 30.0 to 30.9 in adult    Pre-op examination       Review of patient's allergies indicates:  No Known Allergies    Current Inpatient Medications:   acetaminophen  650 mg Oral Q8H    [START ON 10/13/2023] acetaminophen  650 mg Oral Q24H    [START ON 10/13/2023] amLODIPine  10 mg Oral Daily    [START ON 10/13/2023] antithymocyte globulin (rabbit) 125 mg, hydrocortisone sodium succinate (SOLU-CORTEF) 20 mg in sodium chloride 0.9% 500 mL (FOR PERIPHERAL LINE " ADMINISTRATION ONLY)  1.5 mg/kg (Adjusted) Intravenous Daily    atorvastatin  10 mg Oral Daily    bisacodyL  10 mg Oral QHS    carvediloL  25 mg Oral BID    ceFAZolin (ANCEF) IVPB  2 g Intravenous Q8H    [START ON 10/13/2023] diphenhydrAMINE  25 mg Oral Q24H    docusate sodium  100 mg Oral TID    famotidine  20 mg Oral QHS    heparin (porcine)  5,000 Units Subcutaneous Q8H    HYDROmorphone  0.5 mg Intravenous Once    [START ON 10/14/2023] methylPREDNISolone sodium succinate injection  125 mg Intravenous Once    [START ON 10/13/2023] methylPREDNISolone sodium succinate injection  250 mg Intravenous Once    multivitamin  1 tablet Oral Daily    mupirocin  1 g Nasal BID    mycophenolate  1,000 mg Oral BID    [START ON 10/15/2023] predniSONE  20 mg Oral Daily    [START ON 10/22/2023] sulfamethoxazole-trimethoprim 400-80mg  1 tablet Oral Daily AM    tacrolimus  3 mg Oral BID    [START ON 10/22/2023] valGANciclovir  450 mg Oral Daily AM       No current facility-administered medications on file prior to encounter.     Current Outpatient Medications on File Prior to Encounter   Medication Sig Dispense Refill    AURYXIA 210 mg iron Tab Take 2 tablets by mouth once daily.      carvediloL (COREG) 25 MG tablet Take 25 mg by mouth 2 (two) times daily.      clonazePAM (KLONOPIN) 1 MG tablet Take 1 mg by mouth nightly as needed.      furosemide (LASIX) 40 MG tablet Take 40 mg by mouth once daily.      losartan (COZAAR) 100 MG tablet Take 100 mg by mouth once daily.      CRISTIAN-ESEQUIEL 0.8 mg Tab Take 1 tablet by mouth once daily.      sevelamer carbonate (RENVELA) 800 mg Tab Take 2,400 mg by mouth 2 (two) times a day.      sodium bicarbonate 650 MG tablet Take 1 tablet by mouth 3 (three) times daily.      amLODIPine (NORVASC) 5 MG tablet Take 10 mg by mouth once daily.      TRESIBA U-100 INSULIN 100 unit/mL injection Inject 10 Units into the skin once daily.         Past Surgical History:   Procedure  Laterality Date    TONSILLECTOMY         Social History     Socioeconomic History    Marital status:      Spouse name: Krystle Hernandez    Number of children: 2   Tobacco Use    Smoking status: Never    Smokeless tobacco: Never   Substance and Sexual Activity    Alcohol use: Yes     Alcohol/week: 15.0 standard drinks of alcohol     Types: 15 Cans of beer per week     Comment: 15 beers in a week    Drug use: Not Currently    Sexual activity: Not Currently   Social History Narrative    Spoke to patient to complete his history for his upcoming appointment his wife will come with him to his appointment he is aware that he have to bring a small breakfast/snack to eat after blood is drawn he will not need a        OBJECTIVE:     Vital Signs Range (Last 24H):  Temp:  [36.7 °C (98 °F)-37.8 °C (100 °F)]   Pulse:  []   Resp:  [9-23]   BP: (109-184)/(57-92)   SpO2:  [90 %-100 %]       Significant Labs:  Lab Results   Component Value Date    WBC 10.15 10/12/2023    HGB 9.0 (L) 10/12/2023    HCT 27.6 (L) 10/12/2023     10/12/2023    CHOL 105 (L) 09/26/2023    TRIG 126 09/26/2023    HDL 25 (L) 09/26/2023    ALT 21 09/26/2023    AST 17 09/26/2023     10/12/2023    K 4.3 10/12/2023     10/12/2023    CREATININE 14.0 (H) 10/12/2023    BUN 91 (H) 10/12/2023    CO2 17 (L) 10/12/2023    PSA 0.84 12/27/2022    INR 1.0 10/12/2023    HGBA1C 5.1 10/12/2023       Diagnostic Studies: No relevant studies.    EKG:   Results for orders placed or performed during the hospital encounter of 09/26/23   EKG 12-lead    Collection Time: 09/26/23 10:21 AM    Narrative    Test Reason : Z76.82,    Vent. Rate : 059 BPM     Atrial Rate : 059 BPM     P-R Int : 168 ms          QRS Dur : 098 ms      QT Int : 428 ms       P-R-T Axes : 075 030 044 degrees     QTc Int : 423 ms    Sinus bradycardia  LVH with repolarization abnormality ( Sokolow-Allison )  Abnormal ECG  When compared with ECG of 18-JUL-2023  "13:15,  Nonspecific T wave abnormality now evident in Lateral leads  Confirmed by Rashel Mei MD ([id number]) on 9/27/2023 8:40:51 AM    Referred By: GALINA KANG           Confirmed By:Rashel Mei MD       2D ECHO:  TTE:  Results for orders placed or performed during the hospital encounter of 12/27/22   Echo Saline Bubble? No   Result Value Ref Range    BSA 2.14 m2    TDI SEPTAL 0.05 m/s    LV LATERAL E/E' RATIO 9.00 m/s    LV SEPTAL E/E' RATIO 14.40 m/s    LA WIDTH 4.65 cm    TDI LATERAL 0.08 m/s    LVIDd 5.80 3.5 - 6.0 cm    IVS 1.01 0.6 - 1.1 cm    Posterior Wall 0.97 0.6 - 1.1 cm    LVIDs 4.02 (A) 2.1 - 4.0 cm    FS 31 28 - 44 %    LA volume 99.28 cm3    Sinus 4.14 cm    STJ 2.78 cm    Ascending aorta 3.04 cm    LV mass 230.06 g    LA size 4.03 cm    RVDD 4.63 cm    TAPSE 2.57 cm    RV S' 12.79 cm/s    Left Ventricle Relative Wall Thickness 0.33 cm    AV mean gradient 4 mmHg    AV valve area 4.02 cm2    AV Velocity Ratio 0.79     AV index (prosthetic) 0.78     MV valve area p 1/2 method 3.46 cm2    E/A ratio 0.75     Mean e' 0.07 m/s    E wave deceleration time 219.14 msec    IVRT 114.18 msec    MV "A" wave duration 6.28 msec    Pulm vein S/D ratio 1.57     LVOT diameter 2.57 cm    LVOT area 5.2 cm2    LVOT peak tyrel 1.07 m/s    LVOT peak VTI 22.21 cm    Ao peak tyrel 1.36 m/s    Ao VTI 28.62 cm    LVOT stroke volume 115.16 cm3    AV peak gradient 7 mmHg    E/E' ratio 11.08 m/s    MV Peak E Tyrel 0.72 m/s    TR Max Tyrel 3.07 m/s    MV stenosis pressure 1/2 time 63.55 ms    MV Peak A Tyrel 0.96 m/s    PV Peak S Tyrel 0.55 m/s    PV Peak D Tyrel 0.35 m/s    LV Systolic Volume 71.00 mL    LV Systolic Volume Index 33.8 mL/m2    LV Diastolic Volume 166.31 mL    LV Diastolic Volume Index 79.20 mL/m2    LA Volume Index 47.3 mL/m2    LV Mass Index 110 g/m2    RA Major Axis 5.83 cm    Left Atrium Minor Axis 6.35 cm    Left Atrium Major Axis 6.12 cm    Triscuspid Valve Regurgitation Peak Gradient 38 mmHg    RA Width 3.63 cm    Right " Atrial Pressure (from IVC) 3 mmHg    EF 60 %    TV resting pulmonary artery pressure 41 mmHg    Narrative    · The left ventricle is mildly enlarged with normal systolic function.  · The estimated ejection fraction is 60%.  · There is mildly abnormal septal wall motion with small areas of quite   mild wall motion abnormality .  · Moderate left atrial enlargement.  · Grade I left ventricular diastolic dysfunction.  · Mild right ventricular enlargement.  · Moderate right atrial enlargement.  · Mild tricuspid regurgitation.  · Normal central venous pressure (3 mmHg).  · The estimated PA systolic pressure is 41 mmHg.  · There is mild pulmonary hypertension.  · Small posterior pericardial effusion. Trivial under the RA.          CIARAN:  No results found for this or any previous visit.    ASSESSMENT/PLAN:                                                                                                                  10/12/2023  Heath Hernandez is a 63 y.o., male.      Pre-op Assessment    I have reviewed the Patient Summary Reports.     I have reviewed the Nursing Notes. I have reviewed the NPO Status.   I have reviewed the Medications.     Review of Systems  Anesthesia Hx:  No problems with previous Anesthesia  History of prior surgery of interest to airway management or planning: Previous anesthesia: General Denies Family Hx of Anesthesia complications.   Denies Personal Hx of Anesthesia complications.   Cardiovascular:   Hypertension    Renal/:   Chronic Renal Disease    Endocrine:   Diabetes, well controlled, type 2        Physical Exam  General: Well nourished, Cooperative, Alert and Oriented    Airway:  Mallampati: II / I  Mouth Opening: Normal  TM Distance: Normal  Tongue: Normal  Neck ROM: Normal ROM    Dental:  Intact        Anesthesia Plan  Type of Anesthesia, risks & benefits discussed:    Anesthesia Type: Gen ETT  Intra-op Monitoring Plan: Standard ASA Monitors  Post Op Pain Control Plan: multimodal  analgesia and IV/PO Opioids PRN  Induction:  IV  Airway Plan: Direct and Video, Post-Induction  Informed Consent: Informed consent signed with the Patient and all parties understand the risks and agree with anesthesia plan.  All questions answered.   ASA Score: 3 Emergent  Day of Surgery Review of History & Physical: H&P Update referred to the surgeon/provider.    Ready For Surgery From Anesthesia Perspective.     .

## 2023-10-13 NOTE — PLAN OF CARE
AAOx3, tmax 100, c/o pain. Scheduled tylenol given. BG monitored achs. Telemetry monitor in place (NSR-sinus tach). Pt is on RA-pulse ox continuous on bedside monitor. Pt instructed how to use his IS. Vitals q2h. Pt with 30-50 cc of bloody urine since 1900. Pickering flushed and urine returned with no clots noted. I=O q1h. D51/2 NS d/c and sodium bicarb @50 cc/hr started for low CO2. 500 cc NS bolus given @ 1935- urine did not respond to bolus. 1 L NS bolus given @ 2250-urine did not respond to bolus. Repeat labs reviewed with NP BRITTANY Alcantara. Planned for kidney US, but decision was made to take back pt to OR by Dr. Morales. Consents signed. Pt was made NPO. Family at the bedside. RLQ kidney incision with dermabond. LLQ incision from old PD cath with dermabond. DONTRELL/SCD on. Pt in lowest position, side rails up x2, non-skid foot wear in place, call light within reach, pt verbalized understanding to call RN when needed.  Hand hygiene practiced per protocol.  Will continue to monitor.

## 2023-10-13 NOTE — ANESTHESIA PROCEDURE NOTES
Intubation    Date/Time: 10/13/2023 1:18 AM    Performed by: Teresa Eng CRNA  Authorized by: Kellie Small MD    Intubation:     Induction:  Intravenous    Intubated:  Postinduction    Mask Ventilation:  Not attempted    Attempts:  1    Attempted By:  NICOLE    Blade:  Jarvis 3    Laryngeal View Grade: Grade I - full view of cords      Difficult Airway Encountered?: No      Complications:  None    Airway Device:  Oral endotracheal tube    Airway Device Size:  7.5    Style/Cuff Inflation:  Cuffed    Inflation Amount (mL):  8    Tube secured:  23    Secured at:  The lips    Placement Verified By:  Capnometry    Complicating Factors:  None    Findings Post-Intubation:  BS equal bilateral and atraumatic/condition of teeth unchanged

## 2023-10-13 NOTE — NURSING
Urine output dropped to 50 cc the last hour. NP H. Quinton notified. 500 cc bolus of ns ordered. Irrigated herrmann w/o resistance.

## 2023-10-14 LAB
ALBUMIN SERPL BCP-MCNC: 2.2 G/DL (ref 3.5–5.2)
ANION GAP SERPL CALC-SCNC: 7 MMOL/L (ref 8–16)
ANION GAP SERPL CALC-SCNC: 9 MMOL/L (ref 8–16)
BASOPHILS # BLD AUTO: 0 K/UL (ref 0–0.2)
BASOPHILS NFR BLD: 0 % (ref 0–1.9)
BUN SERPL-MCNC: 63 MG/DL (ref 8–23)
BUN SERPL-MCNC: 65 MG/DL (ref 8–23)
CALCIUM SERPL-MCNC: 7.4 MG/DL (ref 8.7–10.5)
CALCIUM SERPL-MCNC: 7.7 MG/DL (ref 8.7–10.5)
CHLORIDE SERPL-SCNC: 109 MMOL/L (ref 95–110)
CHLORIDE SERPL-SCNC: 109 MMOL/L (ref 95–110)
CO2 SERPL-SCNC: 18 MMOL/L (ref 23–29)
CO2 SERPL-SCNC: 22 MMOL/L (ref 23–29)
CREAT SERPL-MCNC: 6.2 MG/DL (ref 0.5–1.4)
CREAT SERPL-MCNC: 7.3 MG/DL (ref 0.5–1.4)
DIFFERENTIAL METHOD: ABNORMAL
EOSINOPHIL # BLD AUTO: 0 K/UL (ref 0–0.5)
EOSINOPHIL NFR BLD: 0 % (ref 0–8)
ERYTHROCYTE [DISTWIDTH] IN BLOOD BY AUTOMATED COUNT: 14 % (ref 11.5–14.5)
EST. GFR  (NO RACE VARIABLE): 7.9 ML/MIN/1.73 M^2
EST. GFR  (NO RACE VARIABLE): 9.6 ML/MIN/1.73 M^2
GLUCOSE SERPL-MCNC: 266 MG/DL (ref 70–110)
GLUCOSE SERPL-MCNC: 285 MG/DL (ref 70–110)
HCT VFR BLD AUTO: 21.4 % (ref 40–54)
HCT VFR BLD AUTO: 22.7 % (ref 40–54)
HGB BLD-MCNC: 7.4 G/DL (ref 14–18)
HGB BLD-MCNC: 7.5 G/DL (ref 14–18)
IMM GRANULOCYTES # BLD AUTO: 0.03 K/UL (ref 0–0.04)
IMM GRANULOCYTES NFR BLD AUTO: 0.4 % (ref 0–0.5)
LYMPHOCYTES # BLD AUTO: 0.1 K/UL (ref 1–4.8)
LYMPHOCYTES NFR BLD: 1 % (ref 18–48)
MAGNESIUM SERPL-MCNC: 2.1 MG/DL (ref 1.6–2.6)
MCH RBC QN AUTO: 30.7 PG (ref 27–31)
MCHC RBC AUTO-ENTMCNC: 34.6 G/DL (ref 32–36)
MCV RBC AUTO: 89 FL (ref 82–98)
MONOCYTES # BLD AUTO: 0.2 K/UL (ref 0.3–1)
MONOCYTES NFR BLD: 2.5 % (ref 4–15)
NEUTROPHILS # BLD AUTO: 6.6 K/UL (ref 1.8–7.7)
NEUTROPHILS NFR BLD: 96.1 % (ref 38–73)
NRBC BLD-RTO: 0 /100 WBC
PHOSPHATE SERPL-MCNC: 4.9 MG/DL (ref 2.7–4.5)
PLATELET # BLD AUTO: 110 K/UL (ref 150–450)
PMV BLD AUTO: 11.1 FL (ref 9.2–12.9)
POCT GLUCOSE: 195 MG/DL (ref 70–110)
POCT GLUCOSE: 196 MG/DL (ref 70–110)
POCT GLUCOSE: 206 MG/DL (ref 70–110)
POCT GLUCOSE: 232 MG/DL (ref 70–110)
POTASSIUM SERPL-SCNC: 4.4 MMOL/L (ref 3.5–5.1)
POTASSIUM SERPL-SCNC: 4.4 MMOL/L (ref 3.5–5.1)
RBC # BLD AUTO: 2.41 M/UL (ref 4.6–6.2)
SODIUM SERPL-SCNC: 136 MMOL/L (ref 136–145)
SODIUM SERPL-SCNC: 138 MMOL/L (ref 136–145)
TACROLIMUS BLD-MCNC: 2.5 NG/ML (ref 5–15)
WBC # BLD AUTO: 6.85 K/UL (ref 3.9–12.7)

## 2023-10-14 PROCEDURE — 63600175 PHARM REV CODE 636 W HCPCS: Performed by: NURSE PRACTITIONER

## 2023-10-14 PROCEDURE — 80069 RENAL FUNCTION PANEL: CPT

## 2023-10-14 PROCEDURE — S5010 5% DEXTROSE AND 0.45% SALINE: HCPCS | Performed by: NURSE PRACTITIONER

## 2023-10-14 PROCEDURE — 63600175 PHARM REV CODE 636 W HCPCS

## 2023-10-14 PROCEDURE — 25000003 PHARM REV CODE 250: Performed by: NURSE PRACTITIONER

## 2023-10-14 PROCEDURE — 99232 SBSQ HOSP IP/OBS MODERATE 35: CPT | Mod: ,,, | Performed by: NURSE PRACTITIONER

## 2023-10-14 PROCEDURE — 83735 ASSAY OF MAGNESIUM: CPT

## 2023-10-14 PROCEDURE — 85014 HEMATOCRIT: CPT | Performed by: NURSE PRACTITIONER

## 2023-10-14 PROCEDURE — 85018 HEMOGLOBIN: CPT | Performed by: NURSE PRACTITIONER

## 2023-10-14 PROCEDURE — 36415 COLL VENOUS BLD VENIPUNCTURE: CPT | Performed by: NURSE PRACTITIONER

## 2023-10-14 PROCEDURE — 36415 COLL VENOUS BLD VENIPUNCTURE: CPT

## 2023-10-14 PROCEDURE — 20600001 HC STEP DOWN PRIVATE ROOM

## 2023-10-14 PROCEDURE — 25000003 PHARM REV CODE 250

## 2023-10-14 PROCEDURE — 99232 PR SUBSEQUENT HOSPITAL CARE,LEVL II: ICD-10-PCS | Mod: ,,, | Performed by: NURSE PRACTITIONER

## 2023-10-14 PROCEDURE — 80048 BASIC METABOLIC PNL TOTAL CA: CPT | Performed by: NURSE PRACTITIONER

## 2023-10-14 PROCEDURE — 25000003 PHARM REV CODE 250: Performed by: INTERNAL MEDICINE

## 2023-10-14 PROCEDURE — 85025 COMPLETE CBC W/AUTO DIFF WBC: CPT

## 2023-10-14 PROCEDURE — 63600175 PHARM REV CODE 636 W HCPCS: Performed by: INTERNAL MEDICINE

## 2023-10-14 PROCEDURE — 80197 ASSAY OF TACROLIMUS: CPT

## 2023-10-14 RX ORDER — CARVEDILOL 6.25 MG/1
6.25 TABLET ORAL 2 TIMES DAILY
Status: DISCONTINUED | OUTPATIENT
Start: 2023-10-14 | End: 2023-10-15

## 2023-10-14 RX ORDER — TALC
6 POWDER (GRAM) TOPICAL NIGHTLY PRN
Status: DISCONTINUED | OUTPATIENT
Start: 2023-10-14 | End: 2023-10-17 | Stop reason: HOSPADM

## 2023-10-14 RX ORDER — INSULIN ASPART 100 [IU]/ML
4 INJECTION, SOLUTION INTRAVENOUS; SUBCUTANEOUS
Status: DISCONTINUED | OUTPATIENT
Start: 2023-10-14 | End: 2023-10-15

## 2023-10-14 RX ORDER — INSULIN ASPART 100 [IU]/ML
0-5 INJECTION, SOLUTION INTRAVENOUS; SUBCUTANEOUS
Status: DISCONTINUED | OUTPATIENT
Start: 2023-10-14 | End: 2023-10-17 | Stop reason: HOSPADM

## 2023-10-14 RX ORDER — TACROLIMUS 1 MG/1
2 CAPSULE ORAL ONCE
Status: COMPLETED | OUTPATIENT
Start: 2023-10-14 | End: 2023-10-14

## 2023-10-14 RX ADMIN — MYCOPHENOLATE MOFETIL 1000 MG: 250 CAPSULE ORAL at 08:10

## 2023-10-14 RX ADMIN — CARVEDILOL 6.25 MG: 6.25 TABLET, FILM COATED ORAL at 11:10

## 2023-10-14 RX ADMIN — Medication 6 MG: at 01:10

## 2023-10-14 RX ADMIN — INSULIN ASPART 4 UNITS: 100 INJECTION, SOLUTION INTRAVENOUS; SUBCUTANEOUS at 12:10

## 2023-10-14 RX ADMIN — INSULIN DETEMIR 12 UNITS: 100 INJECTION, SOLUTION SUBCUTANEOUS at 12:10

## 2023-10-14 RX ADMIN — TACROLIMUS 2 MG: 1 CAPSULE ORAL at 12:10

## 2023-10-14 RX ADMIN — TACROLIMUS 6 MG: 1 CAPSULE ORAL at 06:10

## 2023-10-14 RX ADMIN — CARVEDILOL 6.25 MG: 6.25 TABLET, FILM COATED ORAL at 08:10

## 2023-10-14 RX ADMIN — THERA TABS 1 TABLET: TAB at 08:10

## 2023-10-14 RX ADMIN — SODIUM BICARBONATE: 84 INJECTION, SOLUTION INTRAVENOUS at 05:10

## 2023-10-14 RX ADMIN — MUPIROCIN 1 G: 20 OINTMENT TOPICAL at 08:10

## 2023-10-14 RX ADMIN — FAMOTIDINE 20 MG: 20 TABLET ORAL at 08:10

## 2023-10-14 RX ADMIN — DOCUSATE SODIUM 100 MG: 100 CAPSULE, LIQUID FILLED ORAL at 08:10

## 2023-10-14 RX ADMIN — TACROLIMUS 4 MG: 1 CAPSULE ORAL at 08:10

## 2023-10-14 RX ADMIN — INSULIN ASPART 2 UNITS: 100 INJECTION, SOLUTION INTRAVENOUS; SUBCUTANEOUS at 12:10

## 2023-10-14 RX ADMIN — ACETAMINOPHEN 650 MG: 325 TABLET ORAL at 05:10

## 2023-10-14 RX ADMIN — HEPARIN SODIUM 5000 UNITS: 5000 INJECTION INTRAVENOUS; SUBCUTANEOUS at 08:10

## 2023-10-14 RX ADMIN — SODIUM BICARBONATE: 84 INJECTION, SOLUTION INTRAVENOUS at 09:10

## 2023-10-14 RX ADMIN — DIPHENHYDRAMINE HYDROCHLORIDE 25 MG: 25 CAPSULE ORAL at 12:10

## 2023-10-14 RX ADMIN — INSULIN ASPART 4 UNITS: 100 INJECTION, SOLUTION INTRAVENOUS; SUBCUTANEOUS at 05:10

## 2023-10-14 RX ADMIN — METHYLPREDNISOLONE SODIUM SUCCINATE 125 MG: 125 INJECTION, POWDER, FOR SOLUTION INTRAMUSCULAR; INTRAVENOUS at 12:10

## 2023-10-14 RX ADMIN — HEPARIN SODIUM 5000 UNITS: 5000 INJECTION INTRAVENOUS; SUBCUTANEOUS at 03:10

## 2023-10-14 RX ADMIN — ATORVASTATIN CALCIUM 10 MG: 10 TABLET, FILM COATED ORAL at 08:10

## 2023-10-14 RX ADMIN — ACETAMINOPHEN 650 MG: 325 TABLET ORAL at 12:10

## 2023-10-14 RX ADMIN — ANTI-THYMOCYTE GLOBULIN (RABBIT) 125 MG: 5 INJECTION, POWDER, LYOPHILIZED, FOR SOLUTION INTRAVENOUS at 01:10

## 2023-10-14 NOTE — SUBJECTIVE & OBJECTIVE
"Interval HPI:   Overnight events: No acute events overnight. Patient in room 23802/59537 A. Blood glucose worsening. BG above goal on current insulin regimen (SSI ). Steroid use- Methylprednisolone  125 mg. 1 Day Post-Op  Renal function- Abnormal - Creatinine 6.2   Vasopressors-  None       Endocrine will continue to follow and manage insulin orders inpatient.         Diet Adult Regular (IDDSI Level 7) Standard Tray     Eatin%  Nausea: No  Hypoglycemia and intervention: No  Fever: No  TPN and/or TF: No      BP (!) 172/85 (Patient Position: Lying)   Pulse 91   Temp 97.9 °F (36.6 °C) (Oral)   Resp 18   Ht 5' 10" (1.778 m)   Wt 81.6 kg (179 lb 14.3 oz)   SpO2 (!) 94%   BMI 25.81 kg/m²     Labs Reviewed and Include    Recent Labs   Lab 10/14/23  06   *   CALCIUM 7.4*   ALBUMIN 2.2*      K 4.4   CO2 22*      BUN 63*   CREATININE 6.2*     Lab Results   Component Value Date    WBC 6.85 10/14/2023    HGB 7.4 (L) 10/14/2023    HCT 21.4 (L) 10/14/2023    MCV 89 10/14/2023     (L) 10/14/2023     No results for input(s): "TSH", "FREET4" in the last 168 hours.  Lab Results   Component Value Date    HGBA1C 5.1 10/12/2023       Nutritional status:   Body mass index is 25.81 kg/m².  Lab Results   Component Value Date    ALBUMIN 2.2 (L) 10/14/2023    ALBUMIN 2.4 (L) 10/13/2023    ALBUMIN 2.1 (L) 10/13/2023     No results found for: "PREALBUMIN"    Estimated Creatinine Clearance: 12.9 mL/min (A) (based on SCr of 6.2 mg/dL (H)).    Accu-Checks  Recent Labs     10/12/23  1410 10/12/23  1623 10/12/23  1654 10/12/23  2126 10/13/23  0320 10/13/23  0833 10/13/23  1628 10/13/23  203   POCTGLUCOSE 121* 214* 186* 169* 170* 174* 193* 232*       Current Medications and/or Treatments Impacting Glycemic Control  Immunotherapy:    Immunosuppressants           Stop Route Frequency     tacrolimus capsule 6 mg         -- Oral 2 times daily     tacrolimus capsule 2 mg         -- Oral Once     antithymocyte " globulin (rabbit) 125 mg, hydrocortisone sodium succinate (SOLU-CORTEF) 20 mg in sodium chloride 0.9% 500 mL (FOR PERIPHERAL LINE ADMINISTRATION ONLY)         10/15/23 0859 IV Daily     mycophenolate capsule 1,000 mg         -- Oral 2 times daily          Steroids:   Hormones (From admission, onward)      Start     Stop Route Frequency Ordered    10/15/23 0900  predniSONE tablet 20 mg  (IP TXP KIDNEY POST-OP THYMO WITH PERIPHERAL PRECHECKED)         -- Oral Daily 10/12/23 1346    10/14/23 0800  methylPREDNISolone sodium succinate injection 125 mg  (IP TXP KIDNEY POST-OP THYMO WITH PERIPHERAL PRECHECKED)         -- IV Once 10/12/23 1346    10/14/23 0220  melatonin tablet 6 mg         -- Oral Nightly PRN 10/14/23 0121    10/13/23 0900  antithymocyte globulin (rabbit) 125 mg, hydrocortisone sodium succinate (SOLU-CORTEF) 20 mg in sodium chloride 0.9% 500 mL (FOR PERIPHERAL LINE ADMINISTRATION ONLY)  (IP TXP KIDNEY POST-OP THYMO WITH PERIPHERAL PRECHECKED)         10/15/23 0859 IV Daily 10/12/23 1346    10/12/23 1434  hydrocortisone sodium succinate injection 100 mg  (IP TXP KIDNEY POST-OP THYMO WITH PERIPHERAL PRECHECKED)         03/10/35 0534 IV Once as needed 10/12/23 1346          Pressors:    Autonomic Drugs (From admission, onward)      Start     Stop Route Frequency Ordered    10/12/23 1434  EPINEPHrine (PF) injection 1 mg  (IP TXP KIDNEY POST-OP THYMO WITH PERIPHERAL PRECHECKED)         03/10/35 0534 SubQ Once as needed 10/12/23 1346          Hyperglycemia/Diabetes Medications:   Antihyperglycemics (From admission, onward)      Start     Stop Route Frequency Ordered    10/14/23 1130  insulin aspart U-100 pen 4 Units         -- SubQ 3 times daily with meals 10/14/23 0950    10/14/23 1100  insulin detemir U-100 (Levemir) pen 12 Units         -- SubQ Daily 10/14/23 0950    10/14/23 1050  insulin aspart U-100 pen 0-5 Units         -- SubQ Before meals & nightly PRN 10/14/23 0950

## 2023-10-14 NOTE — PLAN OF CARE
Plan of care reviewed with the patient at the beginning of the shift. S/P kidney transplant with OR take back for a clot. Pickering in place. 1425cc of pink tinged urine overnight. PO intake encouraged. KEYA drain x1. RLQ incision closed with dermabond. H/H and labs done overnight. Neha NP aware of results, no orders given. Blood glucose monitored ac and hs. Supplemental insulin given as ordered. Fall precautions maintained. Pt remained free from falls and injury this shift. Bed locked in lowest position, side rails up x2, call light within reach. Instructed pt to call for assistance as needed. Pt verbalized understanding. Vitals stable. Pt afebrile overnight. Tele monitoring continued. NSR. No acute issues overnight. Will continue to monitor.

## 2023-10-14 NOTE — PROGRESS NOTES
[facility]  Endocrinology  Progress Note    Admit Date: 10/12/2023     Reason for Consult: Management of T2DM, Hyperglycemia     Surgical Procedure and Date: s/p kidney transplant on 10/12/2023    Diabetes diagnosis year: 2009    Home Diabetes Medications:  Tresiba (has not taken in over a year)    How often checking glucose at home? 1-3 x day   BG readings on regimen: 80's in the AM and 130's at lunch time.   Hypoglycemia on the regimen?  No  Missed doses on regimen?  No    Diabetes Complications include:     Hyperglycemia, Diabetic nephropathy  , and Diabetic chronic kidney disease         Complicating diabetes co morbidities:   Glucocorticoid use       HPI: Mr. Hernandez is a 62 y.o. year old White male who has been approved to receive a living related kidney transplant.  He has advanced kidney disease secondary to diabetic nephropathy and IgA nephropathy. Patient is on peritoneal dialysis. He received a LRRT 10/12/23 from his niece for IgA nephropathy and DMII. Endocrine consulted to manage hyperglycemia and type 2 diabetes.     Hemoglobin A1C   Date Value Ref Range Status   10/12/2023 5.1 4.0 - 5.6 % Final     Comment:     ADA Screening Guidelines:  5.7-6.4%  Consistent with prediabetes  >or=6.5%  Consistent with diabetes    High levels of fetal hemoglobin interfere with the HbA1C  assay. Heterozygous hemoglobin variants (HbS, HgC, etc)do  not significantly interfere with this assay.   However, presence of multiple variants may affect accuracy.     10/06/2021 6.6 (H) 4.0 - 5.6 % Final     Comment:     ADA Screening Guidelines:  5.7-6.4%  Consistent with prediabetes  >or=6.5%  Consistent with diabetes    High levels of fetal hemoglobin interfere with the HbA1C  assay. Heterozygous hemoglobin variants (HbS, HgC, etc)do  not significantly interfere with this assay.   However, presence of multiple variants may affect accuracy.                      Interval HPI:   Overnight events: No acute events  "overnight. Patient in room 38096/45821 A. Blood glucose worsening. BG above goal on current insulin regimen (SSI ). Steroid use- Methylprednisolone  125 mg. 1 Day Post-Op  Renal function- Abnormal - Creatinine 6.2   Vasopressors-  None       Endocrine will continue to follow and manage insulin orders inpatient.         Diet Adult Regular (IDDSI Level 7) Standard Tray     Eatin%  Nausea: No  Hypoglycemia and intervention: No  Fever: No  TPN and/or TF: No      BP (!) 172/85 (Patient Position: Lying)   Pulse 91   Temp 97.9 °F (36.6 °C) (Oral)   Resp 18   Ht 5' 10" (1.778 m)   Wt 81.6 kg (179 lb 14.3 oz)   SpO2 (!) 94%   BMI 25.81 kg/m²     Labs Reviewed and Include    Recent Labs   Lab 10/14/23  0613   *   CALCIUM 7.4*   ALBUMIN 2.2*      K 4.4   CO2 22*      BUN 63*   CREATININE 6.2*     Lab Results   Component Value Date    WBC 6.85 10/14/2023    HGB 7.4 (L) 10/14/2023    HCT 21.4 (L) 10/14/2023    MCV 89 10/14/2023     (L) 10/14/2023     No results for input(s): "TSH", "FREET4" in the last 168 hours.  Lab Results   Component Value Date    HGBA1C 5.1 10/12/2023       Nutritional status:   Body mass index is 25.81 kg/m².  Lab Results   Component Value Date    ALBUMIN 2.2 (L) 10/14/2023    ALBUMIN 2.4 (L) 10/13/2023    ALBUMIN 2.1 (L) 10/13/2023     No results found for: "PREALBUMIN"    Estimated Creatinine Clearance: 12.9 mL/min (A) (based on SCr of 6.2 mg/dL (H)).    Accu-Checks  Recent Labs     10/12/23  1410 10/12/23  1623 10/12/23  1654 10/12/23  2126 10/13/23  0320 10/13/23  0833 10/13/23  1628 10/13/23  2036   POCTGLUCOSE 121* 214* 186* 169* 170* 174* 193* 232*       Current Medications and/or Treatments Impacting Glycemic Control  Immunotherapy:    Immunosuppressants           Stop Route Frequency     tacrolimus capsule 6 mg         -- Oral 2 times daily     tacrolimus capsule 2 mg         -- Oral Once     antithymocyte globulin (rabbit) 125 mg, hydrocortisone sodium " succinate (SOLU-CORTEF) 20 mg in sodium chloride 0.9% 500 mL (FOR PERIPHERAL LINE ADMINISTRATION ONLY)         10/15/23 0859 IV Daily     mycophenolate capsule 1,000 mg         -- Oral 2 times daily          Steroids:   Hormones (From admission, onward)      Start     Stop Route Frequency Ordered    10/15/23 0900  predniSONE tablet 20 mg  (IP TXP KIDNEY POST-OP THYMO WITH PERIPHERAL PRECHECKED)         -- Oral Daily 10/12/23 1346    10/14/23 0800  methylPREDNISolone sodium succinate injection 125 mg  (IP TXP KIDNEY POST-OP THYMO WITH PERIPHERAL PRECHECKED)         -- IV Once 10/12/23 1346    10/14/23 0220  melatonin tablet 6 mg         -- Oral Nightly PRN 10/14/23 0121    10/13/23 0900  antithymocyte globulin (rabbit) 125 mg, hydrocortisone sodium succinate (SOLU-CORTEF) 20 mg in sodium chloride 0.9% 500 mL (FOR PERIPHERAL LINE ADMINISTRATION ONLY)  (IP TXP KIDNEY POST-OP THYMO WITH PERIPHERAL PRECHECKED)         10/15/23 0859 IV Daily 10/12/23 1346    10/12/23 1434  hydrocortisone sodium succinate injection 100 mg  (IP TXP KIDNEY POST-OP THYMO WITH PERIPHERAL PRECHECKED)         03/10/35 0534 IV Once as needed 10/12/23 1346          Pressors:    Autonomic Drugs (From admission, onward)      Start     Stop Route Frequency Ordered    10/12/23 1434  EPINEPHrine (PF) injection 1 mg  (IP TXP KIDNEY POST-OP THYMO WITH PERIPHERAL PRECHECKED)         03/10/35 0534 SubQ Once as needed 10/12/23 1346          Hyperglycemia/Diabetes Medications:   Antihyperglycemics (From admission, onward)      Start     Stop Route Frequency Ordered    10/14/23 1130  insulin aspart U-100 pen 4 Units         -- SubQ 3 times daily with meals 10/14/23 0950    10/14/23 1100  insulin detemir U-100 (Levemir) pen 12 Units         -- SubQ Daily 10/14/23 0950    10/14/23 1050  insulin aspart U-100 pen 0-5 Units         -- SubQ Before meals & nightly PRN 10/14/23 0950            ASSESSMENT and PLAN    Cardiac/Vascular  Benign hypertension  Uncontrolled  HTN can worsen insulin resistance.       Renal/  * S/P kidney transplant  Optimize BG control to improve wound healing  Titrate insulin slowly to avoid hypoglycemia as the risk of hypoglycemia increases with decreased creatinine clearance.    Estimated Creatinine Clearance: 12.9 mL/min (A) (based on SCr of 6.2 mg/dL (H)).        Endocrine  Type 2 diabetes mellitus with renal complication  Endocrinology consulted for BG management.   BG goal 140-180    - Novolog (Insulin Aspart) prn for BG excursions MDC SSI (150/25)  - BG checks AC/HS  - Hypoglycemia protocol in place    ** Please notify Endocrine for any change and/or advance in diet**  ** Please call Endocrine for any BG related issues **    Discharge Planning:   TBD. Please notify endocrinology prior to discharge.        Palliative Care  Long-term use of immunosuppressant medication  Glucocorticoids markedly increase glucose levels. Expect the steroid taper will help glucose control.              Milind Vigil, DNP, FNP  Endocrinology  [facility]

## 2023-10-14 NOTE — PROGRESS NOTES
[facility]  Kidney Transplant  Progress Note      Reason for Follow-up: Reassessment of Kidney Transplant - 10/12/2023  (#1) recipient and management of immunosuppression.    ORGAN: LEFT KIDNEY    Donor Type: Living    Donor CMV Status:   Donor HBcAB:  Donor HCV Status:  Donor HBV IKE:   Donor HCV IKE:       Subjective:   History of Present Illness:  Mr. Hernandez is a 62 y.o. year old White male who has been approved to receive a living related kidney transplant.  He has advanced kidney disease secondary to diabetic nephropathy and IgA nephropathy. Patient is on peritoneal dialysis.         Hospital Course:  Mr. Hernandez is a 64 yo man who received a LRRT 10/12/23 from his niece for IgA nephropathy and DMII. PD cath removed as well. Surgery went well, 2 day herrmann, no drain. Post op labs stable, K a little elevated at 5.2 so shifted and given lasix with improvement in K on next lab check. Was making great UOP immediately post op.  On POD1 early AM, had acute drop in UOP.  Taken back to OR and found to have extensive subcapsular hematoma causing Yesika kidney.  After decompression, UOP and kidney function improving.     Interval History   This AM, patient feeling well.  Tolerated liquids.  Pain controlled, but complaining of discomfort from Herrmann.  Perinephric drain in place with serosanguinous output.          Past Medical, Surgical, Family, and Social History:   Unchanged from H&P.    Scheduled Meds:   acetaminophen  650 mg Oral Q8H    acetaminophen  650 mg Oral Q24H    antithymocyte globulin (rabbit) 125 mg, hydrocortisone sodium succinate (SOLU-CORTEF) 20 mg in sodium chloride 0.9% 500 mL (FOR PERIPHERAL LINE ADMINISTRATION ONLY)  1.5 mg/kg (Adjusted) Intravenous Daily    atorvastatin  10 mg Oral Daily    bisacodyL  10 mg Oral QHS    carvediloL  6.25 mg Oral BID    diphenhydrAMINE  25 mg Oral Q24H    docusate sodium  100 mg Oral TID    famotidine  20 mg Oral QHS    heparin (porcine)   5,000 Units Subcutaneous Q8H    insulin aspart U-100  4 Units Subcutaneous TIDWM    insulin detemir U-100  12 Units Subcutaneous Daily    methylPREDNISolone sodium succinate injection  125 mg Intravenous Once    multivitamin  1 tablet Oral Daily    mupirocin  1 g Nasal BID    mycophenolate  1,000 mg Oral BID    [START ON 10/15/2023] predniSONE  20 mg Oral Daily    [START ON 10/22/2023] sulfamethoxazole-trimethoprim 400-80mg  1 tablet Oral Daily AM    tacrolimus  2 mg Oral Once    tacrolimus  6 mg Oral BID    [START ON 10/22/2023] valGANciclovir  450 mg Oral Daily AM     Continuous Infusions:   glucagon (human recombinant)      sodium bicarbonate 150 mEq in dextrose 5 % (D5W) 1,000 mL infusion 100 mL/hr at 10/14/23 0914     PRN Meds:0.9%  NaCl infusion (for blood administration), dextrose 10%, diphenhydrAMINE, EPINEPHrine (PF), glucagon (human recombinant), glucagon (human recombinant), glucose, glucose, glucose, hydrALAZINE, hydrocortisone sodium succinate, insulin aspart U-100, labetalol, melatonin, ondansetron, oxyCODONE, sodium chloride 0.9%, sodium chloride 0.9%, traMADoL    Intake/Output - Last 3 Shifts         10/12 0700  10/13 0659 10/13 0700  10/14 0659 10/14 0700  10/15 0659    P.O. 320 1560     I.V. (mL/kg) 3356.3 (41.1) 2240.5 (27.5)     Blood  278     Other 0      IV Piggyback 6379.6 948.6     Total Intake(mL/kg) 68945.9 (123.2) 5027.1 (61.6)     Urine (mL/kg/hr) 5600 (2.9) 3050 (1.6) 720 (1.9)    Emesis/NG output 0      Drains 125 660 630    Other 0      Stool 0 0     Blood 0      Total Output 5725 3710 1350    Net +4330.9 +1317.1 -1350           Urine Occurrence 0 x      Stool Occurrence 0 x 0 x     Emesis Occurrence 0 x               Review of Systems   Constitutional:  Negative for appetite change, fatigue and fever.   HENT:  Negative for trouble swallowing.    Respiratory:  Negative for cough and shortness of breath.    Cardiovascular:  Negative for chest pain, palpitations and leg  "swelling.   Gastrointestinal:  Positive for abdominal pain (incisional). Negative for abdominal distention, diarrhea, nausea and vomiting.   Genitourinary:  Negative for decreased urine volume and dysuria.        Pickering   Skin:  Positive for wound.   Allergic/Immunologic: Positive for immunocompromised state.   Neurological:  Negative for dizziness and weakness.   Psychiatric/Behavioral:  Negative for confusion.       Objective:     Vital Signs (Most Recent):  Temp: 97.9 °F (36.6 °C) (10/14/23 1105)  Pulse: 91 (10/14/23 1105)  Resp: 18 (10/14/23 1105)  BP: (!) 172/85 (10/14/23 1105)  SpO2: (!) 94 % (10/14/23 1105) Vital Signs (24h Range):  Temp:  [97.8 °F (36.6 °C)-98.8 °F (37.1 °C)] 97.9 °F (36.6 °C)  Pulse:  [77-91] 91  Resp:  [16-18] 18  SpO2:  [94 %-98 %] 94 %  BP: (116-172)/(58-85) 172/85     Weight: 81.6 kg (179 lb 14.3 oz)  Height: 5' 10" (177.8 cm)  Body mass index is 25.81 kg/m².    Physical Exam  Vitals and nursing note reviewed.   Constitutional:       General: He is not in acute distress.  HENT:      Head: Normocephalic.      Mouth/Throat:      Mouth: Mucous membranes are moist.   Eyes:      Extraocular Movements: Extraocular movements intact.      Conjunctiva/sclera: Conjunctivae normal.      Pupils: Pupils are equal, round, and reactive to light.   Cardiovascular:      Rate and Rhythm: Normal rate and regular rhythm.      Heart sounds: Normal heart sounds. No murmur heard.  Pulmonary:      Effort: Pulmonary effort is normal. No respiratory distress.      Breath sounds: Normal breath sounds.   Abdominal:      General: Bowel sounds are normal. There is no distension.      Palpations: Abdomen is soft.      Tenderness: There is abdominal tenderness (incisional, expected post op). There is no guarding or rebound.      Comments: Incision closed with dermabond, CDI, no SSI or leaking.  KEYA drain in place with serosanguinous output.     Musculoskeletal:         General: No swelling. Normal range of motion.      " Cervical back: Normal range of motion.   Skin:     General: Skin is warm and dry.   Neurological:      Mental Status: He is alert and oriented to person, place, and time.      Motor: No weakness.   Psychiatric:         Mood and Affect: Mood normal.         Behavior: Behavior normal.         Thought Content: Thought content normal.         Judgment: Judgment normal.          Laboratory:  CBC:   Recent Labs   Lab 10/13/23  0322 10/13/23  0612 10/13/23  1253 10/13/23  1749 10/14/23  0011 10/14/23  0613   WBC 11.25 10.13  --   --   --  6.85   RBC 2.66* 2.50*  --   --   --  2.41*   HGB 8.1* 7.6*   < > 8.2* 7.5* 7.4*   HCT 24.5* 22.8*  --   --  22.7* 21.4*    152  --   --   --  110*   MCV 92 91  --   --   --  89   MCH 30.5 30.4  --   --   --  30.7   MCHC 33.1 33.3  --   --   --  34.6    < > = values in this interval not displayed.       CMP:   Recent Labs   Lab 10/13/23  1253 10/13/23  1749 10/14/23  0011 10/14/23  0613   * 199* 266* 285*   CALCIUM 7.5* 7.7* 7.7* 7.4*   ALBUMIN 2.1* 2.4*  --  2.2*    140 136 138   K 4.3 4.6 4.4 4.4   CO2 21* 19* 18* 22*    112* 109 109   BUN 70* 74* 65* 63*   CREATININE 9.4* 8.8* 7.3* 6.2*       Labs within the past 24 hours have been reviewed.    Diagnostic Results:  US - Kidney: Results for orders placed during the hospital encounter of 10/12/23    US Transplant Kidney With Doppler    Narrative  EXAMINATION:  US TRANSPLANT KIDNEY WITH DOPPLER    CLINICAL HISTORY:  living donor kidney recipient requiring takeback for subcapsular hematoma evacuation;    TECHNIQUE:  Real time gray scale and doppler ultrasound was performed over the patient's renal allograft.    COMPARISON:  None    FINDINGS:  Status post renal transplant on 10/12/2023, with take back to the OR for drainage of a subcapsular hematoma on 10/13/2023 immediately prior to exam.    Renal allograft in the right lower quadrant.  The upper pole is not well visualized due to hemostatic products used in the  "OR.  Normal perfusion.  No hydronephrosis.  No fluid collections are apparent sonographically.  The bladder is not well visualized.    Vasculature:    Resistive indexes:    *Interlobar: 0.76    *Segmental upper: 0.78    *Segmental middle: 0.83    *Segmental lower: 0.79    Main renal artery peak systolic velocity: 223 cm/sec with normal waveform.    Renal artery/iliac ratio: 1.6.    The main renal vein is patent.    Impression  Satisfactory gray scale and doppler evaluation of renal allograft.    Electronically signed by resident: Chikis Crabtree  Date:    10/13/2023  Time:    05:53    Electronically signed by: Aiden Wells  Date:    10/13/2023  Time:    06:21    Assessment/Plan:     * S/P kidney transplant  - LRRT 10/12/23  - surgery went well, started making great UOP, labs clearing   - UOP dropped abruptly, Cr stopped clearing   - TB for ex lab, subcapsular hematoma evacuated with return of UOP; 2 day herrmann, 1 drain  - post TB labs stable, hgb dropped to <7, 1 unit PRBCs transfused   - strict Is and Os   - herrmann out this AM  - Advance to regular diet        At risk for opportunistic infections  - valcyte for CMV, bactrim for PJP       Prophylactic immunotherapy  - tac, MMF, pred   - monitor tac levels for toxicity and therapeutic efforts       Long-term use of immunosuppressant medication  - see "prophylactic immunotherapy"      Anemia, chronic disease  - h/h decreased but stable   - monitor with CBC       Acute blood loss anemia  - 2/2 transplant and takeback   - expected post op   - 1 unit 10/13   - keep T$S up to date   - see "anemia chronic disease"    Benign hypertension  - cont home meds as appropriate with hold parameters in place   - Increasing Cored      Type 2 diabetes mellitus with renal complication  - endocrine consulted, appreciate assistance           Discharge Planning:  Not medically ready for discharge  Medical decision making for this encounter includes review of pertinent labs and " diagnostic studies, assessment and planning, discussions with consulting providers, discussion with patient/family, and participation in multidisciplinary rounds. Time spent caring for patient: 30 minutes    HOLDEN AGUIRRE MD  Kidney Transplant  [facility]

## 2023-10-14 NOTE — ASSESSMENT & PLAN NOTE
- LRRT 10/12/23  - surgery went well, started making great UOP, labs clearing   - UOP dropped abruptly, Cr stopped clearing   - TB for ex lab, subcapsular hematoma evacuated with return of UOP; 2 day herrmann, 1 drain  - post TB labs stable, hgb dropped to <7, 1 unit PRBCs transfused   - strict Is and Os   - herrmann out this AM  - Advance to regular diet

## 2023-10-14 NOTE — PLAN OF CARE
Patient had urinary catheter removed this am. He ambulated to bathroom with assistance. He had 2 bowel movents today. Strict I&O order. He has Q4 bulb suction output ordered. Incision to LRQ clean dry and intact. He is on a regular diet. Glucose checks ACHS with long and short acting insulins added to his daily medication list. He is currently on telemetry with NS rhythm noted. Sodium bicarb was decreased to 50 ml/hr continuous until 2200. Thymo #3 currently running at this time. Prograf was increased to 6 mg twice daily due to prograf level being 2.5 ng/ml. Blue card reflex the new prograf dose.

## 2023-10-14 NOTE — ASSESSMENT & PLAN NOTE
Optimize BG control to improve wound healing  Titrate insulin slowly to avoid hypoglycemia as the risk of hypoglycemia increases with decreased creatinine clearance.    Estimated Creatinine Clearance: 12.9 mL/min (A) (based on SCr of 6.2 mg/dL (H)).

## 2023-10-14 NOTE — NURSING
"Pt insisting that KEYA drain be emptied. RN attempted to explain to the patient that the drain is ordered to be emptied q 4hr and should only be emptied as ordered. Explained to the patient that the drain was initially emptied q 2hr but it is now q 4hr. Pt stated "if you don't get over here and empty it then I will". KEYA drain emptied by RNStephanie notified and will come talk to the patient to further educate him on the drain care protocol.   "

## 2023-10-14 NOTE — SUBJECTIVE & OBJECTIVE
Subjective:   History of Present Illness:  Mr. Hernandez is a 62 y.o. year old White male who has been approved to receive a living related kidney transplant.  He has advanced kidney disease secondary to diabetic nephropathy and IgA nephropathy. Patient is on peritoneal dialysis.         Hospital Course:  Mr. Hernandez is a 64 yo man who received a LRRT 10/12/23 from his niece for IgA nephropathy and DMII. PD cath removed as well. Surgery went well, 2 day herrmann, no drain. Post op labs stable, K a little elevated at 5.2 so shifted and given lasix with improvement in K on next lab check. Was making great UOP immediately post op.  On POD1 early AM, had acute drop in UOP.  Taken back to OR and found to have extensive subcapsular hematoma causing Yesika kidney.  After decompression, UOP and kidney function improving.     Interval History   This AM, patient feeling well.  Tolerated liquids.  Pain controlled, but complaining of discomfort from Herrmann.  Perinephric drain in place with serosanguinous output.          Past Medical, Surgical, Family, and Social History:   Unchanged from H&P.    Scheduled Meds:   acetaminophen  650 mg Oral Q8H    acetaminophen  650 mg Oral Q24H    antithymocyte globulin (rabbit) 125 mg, hydrocortisone sodium succinate (SOLU-CORTEF) 20 mg in sodium chloride 0.9% 500 mL (FOR PERIPHERAL LINE ADMINISTRATION ONLY)  1.5 mg/kg (Adjusted) Intravenous Daily    atorvastatin  10 mg Oral Daily    bisacodyL  10 mg Oral QHS    carvediloL  6.25 mg Oral BID    diphenhydrAMINE  25 mg Oral Q24H    docusate sodium  100 mg Oral TID    famotidine  20 mg Oral QHS    heparin (porcine)  5,000 Units Subcutaneous Q8H    insulin aspart U-100  4 Units Subcutaneous TIDWM    insulin detemir U-100  12 Units Subcutaneous Daily    methylPREDNISolone sodium succinate injection  125 mg Intravenous Once    multivitamin  1 tablet Oral Daily    mupirocin  1 g Nasal BID    mycophenolate  1,000 mg Oral BID    [START ON 10/15/2023]  predniSONE  20 mg Oral Daily    [START ON 10/22/2023] sulfamethoxazole-trimethoprim 400-80mg  1 tablet Oral Daily AM    tacrolimus  2 mg Oral Once    tacrolimus  6 mg Oral BID    [START ON 10/22/2023] valGANciclovir  450 mg Oral Daily AM     Continuous Infusions:   glucagon (human recombinant)      sodium bicarbonate 150 mEq in dextrose 5 % (D5W) 1,000 mL infusion 100 mL/hr at 10/14/23 0914     PRN Meds:0.9%  NaCl infusion (for blood administration), dextrose 10%, diphenhydrAMINE, EPINEPHrine (PF), glucagon (human recombinant), glucagon (human recombinant), glucose, glucose, glucose, hydrALAZINE, hydrocortisone sodium succinate, insulin aspart U-100, labetalol, melatonin, ondansetron, oxyCODONE, sodium chloride 0.9%, sodium chloride 0.9%, traMADoL    Intake/Output - Last 3 Shifts         10/12 0700  10/13 0659 10/13 0700  10/14 0659 10/14 0700  10/15 0659    P.O. 320 1560     I.V. (mL/kg) 3356.3 (41.1) 2240.5 (27.5)     Blood  278     Other 0      IV Piggyback 6379.6 948.6     Total Intake(mL/kg) 93505.9 (123.2) 5027.1 (61.6)     Urine (mL/kg/hr) 5600 (2.9) 3050 (1.6) 720 (1.9)    Emesis/NG output 0      Drains 125 660 630    Other 0      Stool 0 0     Blood 0      Total Output 5725 3710 1350    Net +4330.9 +1317.1 -1350           Urine Occurrence 0 x      Stool Occurrence 0 x 0 x     Emesis Occurrence 0 x               Review of Systems   Constitutional:  Negative for appetite change, fatigue and fever.   HENT:  Negative for trouble swallowing.    Respiratory:  Negative for cough and shortness of breath.    Cardiovascular:  Negative for chest pain, palpitations and leg swelling.   Gastrointestinal:  Positive for abdominal pain (incisional). Negative for abdominal distention, diarrhea, nausea and vomiting.   Genitourinary:  Negative for decreased urine volume and dysuria.        Pickering   Skin:  Positive for wound.   Allergic/Immunologic: Positive for immunocompromised state.   Neurological:  Negative for dizziness  "and weakness.   Psychiatric/Behavioral:  Negative for confusion.       Objective:     Vital Signs (Most Recent):  Temp: 97.9 °F (36.6 °C) (10/14/23 1105)  Pulse: 91 (10/14/23 1105)  Resp: 18 (10/14/23 1105)  BP: (!) 172/85 (10/14/23 1105)  SpO2: (!) 94 % (10/14/23 1105) Vital Signs (24h Range):  Temp:  [97.8 °F (36.6 °C)-98.8 °F (37.1 °C)] 97.9 °F (36.6 °C)  Pulse:  [77-91] 91  Resp:  [16-18] 18  SpO2:  [94 %-98 %] 94 %  BP: (116-172)/(58-85) 172/85     Weight: 81.6 kg (179 lb 14.3 oz)  Height: 5' 10" (177.8 cm)  Body mass index is 25.81 kg/m².     Physical Exam  Vitals and nursing note reviewed.   Constitutional:       General: He is not in acute distress.  HENT:      Head: Normocephalic.      Mouth/Throat:      Mouth: Mucous membranes are moist.   Eyes:      Extraocular Movements: Extraocular movements intact.      Conjunctiva/sclera: Conjunctivae normal.      Pupils: Pupils are equal, round, and reactive to light.   Cardiovascular:      Rate and Rhythm: Normal rate and regular rhythm.      Heart sounds: Normal heart sounds. No murmur heard.  Pulmonary:      Effort: Pulmonary effort is normal. No respiratory distress.      Breath sounds: Normal breath sounds.   Abdominal:      General: Bowel sounds are normal. There is no distension.      Palpations: Abdomen is soft.      Tenderness: There is abdominal tenderness (incisional, expected post op). There is no guarding or rebound.      Comments: Incision closed with dermabond, CDI, no SSI or leaking.  KEYA drain in place with serosanguinous output.     Musculoskeletal:         General: No swelling. Normal range of motion.      Cervical back: Normal range of motion.   Skin:     General: Skin is warm and dry.   Neurological:      Mental Status: He is alert and oriented to person, place, and time.      Motor: No weakness.   Psychiatric:         Mood and Affect: Mood normal.         Behavior: Behavior normal.         Thought Content: Thought content normal.         " Judgment: Judgment normal.          Laboratory:  CBC:   Recent Labs   Lab 10/13/23  0322 10/13/23  0612 10/13/23  1253 10/13/23  1749 10/14/23  0011 10/14/23  0613   WBC 11.25 10.13  --   --   --  6.85   RBC 2.66* 2.50*  --   --   --  2.41*   HGB 8.1* 7.6*   < > 8.2* 7.5* 7.4*   HCT 24.5* 22.8*  --   --  22.7* 21.4*    152  --   --   --  110*   MCV 92 91  --   --   --  89   MCH 30.5 30.4  --   --   --  30.7   MCHC 33.1 33.3  --   --   --  34.6    < > = values in this interval not displayed.       CMP:   Recent Labs   Lab 10/13/23  1253 10/13/23  1749 10/14/23  0011 10/14/23  0613   * 199* 266* 285*   CALCIUM 7.5* 7.7* 7.7* 7.4*   ALBUMIN 2.1* 2.4*  --  2.2*    140 136 138   K 4.3 4.6 4.4 4.4   CO2 21* 19* 18* 22*    112* 109 109   BUN 70* 74* 65* 63*   CREATININE 9.4* 8.8* 7.3* 6.2*       Labs within the past 24 hours have been reviewed.    Diagnostic Results:  US - Kidney: Results for orders placed during the hospital encounter of 10/12/23    US Transplant Kidney With Doppler    Narrative  EXAMINATION:  US TRANSPLANT KIDNEY WITH DOPPLER    CLINICAL HISTORY:  living donor kidney recipient requiring takeback for subcapsular hematoma evacuation;    TECHNIQUE:  Real time gray scale and doppler ultrasound was performed over the patient's renal allograft.    COMPARISON:  None    FINDINGS:  Status post renal transplant on 10/12/2023, with take back to the OR for drainage of a subcapsular hematoma on 10/13/2023 immediately prior to exam.    Renal allograft in the right lower quadrant.  The upper pole is not well visualized due to hemostatic products used in the OR.  Normal perfusion.  No hydronephrosis.  No fluid collections are apparent sonographically.  The bladder is not well visualized.    Vasculature:    Resistive indexes:    *Interlobar: 0.76    *Segmental upper: 0.78    *Segmental middle: 0.83    *Segmental lower: 0.79    Main renal artery peak systolic velocity: 223 cm/sec with normal  waveform.    Renal artery/iliac ratio: 1.6.    The main renal vein is patent.    Impression  Satisfactory gray scale and doppler evaluation of renal allograft.    Electronically signed by resident: Chikis Crabtree  Date:    10/13/2023  Time:    05:53    Electronically signed by: Aiden Wells  Date:    10/13/2023  Time:    06:21

## 2023-10-14 NOTE — PROGRESS NOTES
Small amount of serosanguineous drainage noted when he got up to use bathroom. Old dressing removed and new dressing applied. Site is now clean dry and intact.

## 2023-10-14 NOTE — ASSESSMENT & PLAN NOTE
Endocrinology consulted for BG management.   BG goal 140-180    - Novolog (Insulin Aspart) prn for BG excursions [facility] SSI (150/25)  - BG checks AC/HS  - Hypoglycemia protocol in place    ** Please notify Endocrine for any change and/or advance in diet**  ** Please call Endocrine for any BG related issues **    Discharge Planning:   TBD. Please notify endocrinology prior to discharge.

## 2023-10-15 PROBLEM — Z01.818 PRE-OP EXAMINATION: Chronic | Status: RESOLVED | Noted: 2023-07-17 | Resolved: 2023-10-15

## 2023-10-15 LAB
ALBUMIN SERPL BCP-MCNC: 2.5 G/DL (ref 3.5–5.2)
ANION GAP SERPL CALC-SCNC: 6 MMOL/L (ref 8–16)
BASOPHILS # BLD AUTO: 0 K/UL (ref 0–0.2)
BASOPHILS NFR BLD: 0 % (ref 0–1.9)
BODY FLUID SOURCE, CREATININE: NORMAL
BUN SERPL-MCNC: 39 MG/DL (ref 8–23)
CALCIUM SERPL-MCNC: 7.9 MG/DL (ref 8.7–10.5)
CHLORIDE SERPL-SCNC: 108 MMOL/L (ref 95–110)
CO2 SERPL-SCNC: 25 MMOL/L (ref 23–29)
CREAT FLD-MCNC: 4.2 MG/DL
CREAT SERPL-MCNC: 3.1 MG/DL (ref 0.5–1.4)
DIFFERENTIAL METHOD: ABNORMAL
EOSINOPHIL # BLD AUTO: 0 K/UL (ref 0–0.5)
EOSINOPHIL NFR BLD: 0.5 % (ref 0–8)
ERYTHROCYTE [DISTWIDTH] IN BLOOD BY AUTOMATED COUNT: 14.2 % (ref 11.5–14.5)
EST. GFR  (NO RACE VARIABLE): 22 ML/MIN/1.73 M^2
GLUCOSE SERPL-MCNC: 99 MG/DL (ref 70–110)
HCT VFR BLD AUTO: 23.7 % (ref 40–54)
HGB BLD-MCNC: 8 G/DL (ref 14–18)
IMM GRANULOCYTES # BLD AUTO: 0.02 K/UL (ref 0–0.04)
IMM GRANULOCYTES NFR BLD AUTO: 0.5 % (ref 0–0.5)
LYMPHOCYTES # BLD AUTO: 0.1 K/UL (ref 1–4.8)
LYMPHOCYTES NFR BLD: 1.3 % (ref 18–48)
MAGNESIUM SERPL-MCNC: 1.9 MG/DL (ref 1.6–2.6)
MCH RBC QN AUTO: 30.3 PG (ref 27–31)
MCHC RBC AUTO-ENTMCNC: 33.8 G/DL (ref 32–36)
MCV RBC AUTO: 90 FL (ref 82–98)
MONOCYTES # BLD AUTO: 0.1 K/UL (ref 0.3–1)
MONOCYTES NFR BLD: 2.3 % (ref 4–15)
NEUTROPHILS # BLD AUTO: 3.7 K/UL (ref 1.8–7.7)
NEUTROPHILS NFR BLD: 95.4 % (ref 38–73)
NRBC BLD-RTO: 0 /100 WBC
PHOSPHATE SERPL-MCNC: 2.1 MG/DL (ref 2.7–4.5)
PLATELET # BLD AUTO: 103 K/UL (ref 150–450)
PMV BLD AUTO: 10.7 FL (ref 9.2–12.9)
POCT GLUCOSE: 113 MG/DL (ref 70–110)
POCT GLUCOSE: 125 MG/DL (ref 70–110)
POCT GLUCOSE: 141 MG/DL (ref 70–110)
POCT GLUCOSE: 188 MG/DL (ref 70–110)
POTASSIUM SERPL-SCNC: 4.2 MMOL/L (ref 3.5–5.1)
RBC # BLD AUTO: 2.64 M/UL (ref 4.6–6.2)
SODIUM SERPL-SCNC: 139 MMOL/L (ref 136–145)
TACROLIMUS BLD-MCNC: 2.3 NG/ML (ref 5–15)
WBC # BLD AUTO: 3.89 K/UL (ref 3.9–12.7)

## 2023-10-15 PROCEDURE — 85025 COMPLETE CBC W/AUTO DIFF WBC: CPT

## 2023-10-15 PROCEDURE — 80197 ASSAY OF TACROLIMUS: CPT

## 2023-10-15 PROCEDURE — 25000003 PHARM REV CODE 250

## 2023-10-15 PROCEDURE — 99232 SBSQ HOSP IP/OBS MODERATE 35: CPT | Mod: ,,, | Performed by: NURSE PRACTITIONER

## 2023-10-15 PROCEDURE — 63600175 PHARM REV CODE 636 W HCPCS: Performed by: INTERNAL MEDICINE

## 2023-10-15 PROCEDURE — 63600175 PHARM REV CODE 636 W HCPCS

## 2023-10-15 PROCEDURE — 83735 ASSAY OF MAGNESIUM: CPT

## 2023-10-15 PROCEDURE — 25000003 PHARM REV CODE 250: Performed by: INTERNAL MEDICINE

## 2023-10-15 PROCEDURE — 82570 ASSAY OF URINE CREATININE: CPT | Performed by: STUDENT IN AN ORGANIZED HEALTH CARE EDUCATION/TRAINING PROGRAM

## 2023-10-15 PROCEDURE — 25000003 PHARM REV CODE 250: Performed by: NURSE PRACTITIONER

## 2023-10-15 PROCEDURE — 99232 PR SUBSEQUENT HOSPITAL CARE,LEVL II: ICD-10-PCS | Mod: ,,, | Performed by: NURSE PRACTITIONER

## 2023-10-15 PROCEDURE — 36415 COLL VENOUS BLD VENIPUNCTURE: CPT

## 2023-10-15 PROCEDURE — 80069 RENAL FUNCTION PANEL: CPT

## 2023-10-15 PROCEDURE — 20600001 HC STEP DOWN PRIVATE ROOM

## 2023-10-15 RX ORDER — CARVEDILOL 12.5 MG/1
12.5 TABLET ORAL 2 TIMES DAILY
Status: DISCONTINUED | OUTPATIENT
Start: 2023-10-15 | End: 2023-10-17 | Stop reason: HOSPADM

## 2023-10-15 RX ORDER — INSULIN ASPART 100 [IU]/ML
3 INJECTION, SOLUTION INTRAVENOUS; SUBCUTANEOUS
Status: DISCONTINUED | OUTPATIENT
Start: 2023-10-15 | End: 2023-10-17 | Stop reason: HOSPADM

## 2023-10-15 RX ORDER — TACROLIMUS 1 MG/1
2 CAPSULE ORAL ONCE
Status: COMPLETED | OUTPATIENT
Start: 2023-10-15 | End: 2023-10-15

## 2023-10-15 RX ADMIN — TACROLIMUS 6 MG: 1 CAPSULE ORAL at 09:10

## 2023-10-15 RX ADMIN — INSULIN ASPART 3 UNITS: 100 INJECTION, SOLUTION INTRAVENOUS; SUBCUTANEOUS at 05:10

## 2023-10-15 RX ADMIN — TACROLIMUS 8 MG: 1 CAPSULE ORAL at 05:10

## 2023-10-15 RX ADMIN — CARVEDILOL 6.25 MG: 6.25 TABLET, FILM COATED ORAL at 09:10

## 2023-10-15 RX ADMIN — INSULIN ASPART 4 UNITS: 100 INJECTION, SOLUTION INTRAVENOUS; SUBCUTANEOUS at 07:10

## 2023-10-15 RX ADMIN — INSULIN ASPART 3 UNITS: 100 INJECTION, SOLUTION INTRAVENOUS; SUBCUTANEOUS at 12:10

## 2023-10-15 RX ADMIN — DIBASIC SODIUM PHOSPHATE, MONOBASIC POTASSIUM PHOSPHATE AND MONOBASIC SODIUM PHOSPHATE 2 TABLET: 852; 155; 130 TABLET ORAL at 08:10

## 2023-10-15 RX ADMIN — MUPIROCIN 1 G: 20 OINTMENT TOPICAL at 09:10

## 2023-10-15 RX ADMIN — MYCOPHENOLATE MOFETIL 1000 MG: 250 CAPSULE ORAL at 09:10

## 2023-10-15 RX ADMIN — DOCUSATE SODIUM 100 MG: 100 CAPSULE, LIQUID FILLED ORAL at 08:10

## 2023-10-15 RX ADMIN — PREDNISONE 20 MG: 20 TABLET ORAL at 09:10

## 2023-10-15 RX ADMIN — DIBASIC SODIUM PHOSPHATE, MONOBASIC POTASSIUM PHOSPHATE AND MONOBASIC SODIUM PHOSPHATE 2 TABLET: 852; 155; 130 TABLET ORAL at 11:10

## 2023-10-15 RX ADMIN — MUPIROCIN 1 G: 20 OINTMENT TOPICAL at 08:10

## 2023-10-15 RX ADMIN — HEPARIN SODIUM 5000 UNITS: 5000 INJECTION INTRAVENOUS; SUBCUTANEOUS at 08:10

## 2023-10-15 RX ADMIN — THERA TABS 1 TABLET: TAB at 09:10

## 2023-10-15 RX ADMIN — DOCUSATE SODIUM 100 MG: 100 CAPSULE, LIQUID FILLED ORAL at 09:10

## 2023-10-15 RX ADMIN — ATORVASTATIN CALCIUM 10 MG: 10 TABLET, FILM COATED ORAL at 09:10

## 2023-10-15 RX ADMIN — FAMOTIDINE 20 MG: 20 TABLET ORAL at 08:10

## 2023-10-15 RX ADMIN — CARVEDILOL 12.5 MG: 12.5 TABLET, FILM COATED ORAL at 08:10

## 2023-10-15 RX ADMIN — MYCOPHENOLATE MOFETIL 1000 MG: 250 CAPSULE ORAL at 08:10

## 2023-10-15 RX ADMIN — HEPARIN SODIUM 5000 UNITS: 5000 INJECTION INTRAVENOUS; SUBCUTANEOUS at 01:10

## 2023-10-15 RX ADMIN — TACROLIMUS 2 MG: 1 CAPSULE ORAL at 11:10

## 2023-10-15 RX ADMIN — HEPARIN SODIUM 5000 UNITS: 5000 INJECTION INTRAVENOUS; SUBCUTANEOUS at 04:10

## 2023-10-15 RX ADMIN — INSULIN ASPART 0 UNITS: 100 INJECTION, SOLUTION INTRAVENOUS; SUBCUTANEOUS at 05:10

## 2023-10-15 RX ADMIN — INSULIN DETEMIR 12 UNITS: 100 INJECTION, SOLUTION SUBCUTANEOUS at 08:10

## 2023-10-15 NOTE — ASSESSMENT & PLAN NOTE
Optimize BG control to improve wound healing  Titrate insulin slowly to avoid hypoglycemia as the risk of hypoglycemia increases with decreased creatinine clearance.    Estimated Creatinine Clearance: 25.8 mL/min (A) (based on SCr of 3.1 mg/dL (H)).

## 2023-10-15 NOTE — SUBJECTIVE & OBJECTIVE
"Interval HPI:   Overnight events: No acute events overnight. Patient in room 03804/90912 A. Blood glucose improving. BG at and above goal on current insulin regimen (SSI ). Steroid use- Prednisone 20 mg. 2 Days Post-Op  Renal function- Abnormal - Creatinine 6.2   Vasopressors-  None       Endocrine will continue to follow and manage insulin orders inpatient.         Diet Adult Regular (IDDSI Level 7) Standard Tray     Eatin%  Nausea: No  Hypoglycemia and intervention: No  Fever: No  TPN and/or TF: No      BP (!) 145/77 (Patient Position: Lying)   Pulse 99   Temp 98.8 °F (37.1 °C) (Oral)   Resp 18   Ht 5' 10" (1.778 m)   Wt 85.8 kg (189 lb 2.5 oz)   SpO2 96%   BMI 27.14 kg/m²     Labs Reviewed and Include    Recent Labs   Lab 10/15/23  0703   GLU 99   CALCIUM 7.9*   ALBUMIN 2.5*      K 4.2   CO2 25      BUN 39*   CREATININE 3.1*     Lab Results   Component Value Date    WBC 3.89 (L) 10/15/2023    HGB 8.0 (L) 10/15/2023    HCT 23.7 (L) 10/15/2023    MCV 90 10/15/2023     (L) 10/15/2023     No results for input(s): "TSH", "FREET4" in the last 168 hours.  Lab Results   Component Value Date    HGBA1C 5.1 10/12/2023       Nutritional status:   Body mass index is 27.14 kg/m².  Lab Results   Component Value Date    ALBUMIN 2.5 (L) 10/15/2023    ALBUMIN 2.2 (L) 10/14/2023    ALBUMIN 2.4 (L) 10/13/2023     No results found for: "PREALBUMIN"    Estimated Creatinine Clearance: 25.8 mL/min (A) (based on SCr of 3.1 mg/dL (H)).    Accu-Checks  Recent Labs     10/12/23  1654 10/12/23  2126 10/13/23  0320 10/13/23  0833 10/13/23  1628 10/13/23  2036 10/14/23  1258 10/14/23  1723 10/14/23  2143 10/15/23  0838   POCTGLUCOSE 186* 169* 170* 174* 193* 232* 206* 196* 195* 113*       Current Medications and/or Treatments Impacting Glycemic Control  Immunotherapy:    Immunosuppressants           Stop Route Frequency     tacrolimus capsule 8 mg         -- Oral 2 times daily     tacrolimus capsule 2 mg      "    -- Oral Once     mycophenolate capsule 1,000 mg         -- Oral 2 times daily          Steroids:   Hormones (From admission, onward)      Start     Stop Route Frequency Ordered    10/15/23 0900  predniSONE tablet 20 mg  (IP TXP KIDNEY POST-OP THYMO WITH PERIPHERAL PRECHECKED)         -- Oral Daily 10/12/23 1346    10/14/23 0220  melatonin tablet 6 mg         -- Oral Nightly PRN 10/14/23 0121    10/12/23 1434  hydrocortisone sodium succinate injection 100 mg  (IP TXP KIDNEY POST-OP THYMO WITH PERIPHERAL PRECHECKED)         03/10/35 0534 IV Once as needed 10/12/23 1346          Pressors:    Autonomic Drugs (From admission, onward)      Start     Stop Route Frequency Ordered    10/12/23 1434  EPINEPHrine (PF) injection 1 mg  (IP TXP KIDNEY POST-OP THYMO WITH PERIPHERAL PRECHECKED)         03/10/35 0534 SubQ Once as needed 10/12/23 1346          Hyperglycemia/Diabetes Medications:   Antihyperglycemics (From admission, onward)      Start     Stop Route Frequency Ordered    10/16/23 0900  insulin detemir U-100 (Levemir) pen 8 Units         -- SubQ Daily 10/15/23 1002    10/15/23 1130  insulin aspart U-100 pen 3 Units         -- SubQ 3 times daily with meals 10/15/23 1002    10/14/23 1050  insulin aspart U-100 pen 0-5 Units         -- SubQ Before meals & nightly PRN 10/14/23 0950

## 2023-10-15 NOTE — SUBJECTIVE & OBJECTIVE
Subjective:   History of Present Illness:  Mr. Hernandez is a 62 y.o. year old White male who has been approved to receive a living related kidney transplant.  He has advanced kidney disease secondary to diabetic nephropathy and IgA nephropathy. Patient is on peritoneal dialysis.         Hospital Course:  Mr. Hernandez is a 64 yo man who received a LRRT 10/12/23 from his niece for IgA nephropathy and DMII. PD cath removed as well. Surgery went well, 2 day herrmann, no drain. Post op labs stable, K a little elevated at 5.2 so shifted and given lasix with improvement in K on next lab check. Was making great UOP immediately post op.  On POD1 early AM, had acute drop in UOP.  Taken back to OR and found to have extensive subcapsular hematoma causing Yesika kidney.  After decompression, UOP and kidney function improving.     Interval History   This AM, patient feeling well.  Tolerated diet.  Pain controlled.  Urinating without difficulty after Herrmann removal.  Perinephric drain in place with serosanguinous output, 710cc/24hr.          Past Medical, Surgical, Family, and Social History:   Unchanged from H&P.    Scheduled Meds:   atorvastatin  10 mg Oral Daily    bisacodyL  10 mg Oral QHS    carvediloL  6.25 mg Oral BID    docusate sodium  100 mg Oral TID    famotidine  20 mg Oral QHS    heparin (porcine)  5,000 Units Subcutaneous Q8H    insulin aspart U-100  3 Units Subcutaneous TIDWM    [START ON 10/16/2023] insulin detemir U-100  8 Units Subcutaneous Daily    k phos di & mono-sod phos mono  2 tablet Oral BID    multivitamin  1 tablet Oral Daily    mupirocin  1 g Nasal BID    mycophenolate  1,000 mg Oral BID    predniSONE  20 mg Oral Daily    [START ON 10/22/2023] sulfamethoxazole-trimethoprim 400-80mg  1 tablet Oral Daily AM    tacrolimus  6 mg Oral BID    [START ON 10/22/2023] valGANciclovir  450 mg Oral Daily AM     Continuous Infusions:   glucagon (human recombinant)       PRN Meds:0.9%  NaCl infusion (for blood  "administration), dextrose 10%, diphenhydrAMINE, EPINEPHrine (PF), glucagon (human recombinant), glucagon (human recombinant), glucose, glucose, glucose, hydrALAZINE, hydrocortisone sodium succinate, insulin aspart U-100, labetalol, melatonin, ondansetron, oxyCODONE, sodium chloride 0.9%, sodium chloride 0.9%, traMADoL    Intake/Output - Last 3 Shifts         10/13 0700  10/14 0659 10/14 0700  10/15 0659 10/15 0700  10/16 0659    P.O. 1560 2342 620    I.V. (mL/kg) 2240.5 (27.5)      Blood 278      Other       IV Piggyback 948.6      Total Intake(mL/kg) 5027.1 (61.6) 2342 (27.3) 620 (7.2)    Urine (mL/kg/hr) 3050 (1.6) 3440 (1.7) 380 (1.2)    Emesis/NG output       Drains 660 710 70    Other       Stool 0 0     Blood       Total Output 3710 4150 450    Net +1317.1 -1808 +170           Stool Occurrence 0 x 2 x              Review of Systems   Constitutional:  Negative for appetite change, fatigue and fever.   HENT:  Negative for trouble swallowing.    Respiratory:  Negative for cough and shortness of breath.    Cardiovascular:  Negative for chest pain, palpitations and leg swelling.   Gastrointestinal:  Negative for abdominal distention, abdominal pain (incisional), diarrhea, nausea and vomiting.   Genitourinary:  Negative for decreased urine volume and dysuria.        Pickering   Skin:  Positive for wound.   Allergic/Immunologic: Positive for immunocompromised state.   Neurological:  Negative for dizziness and weakness.   Psychiatric/Behavioral:  Negative for confusion.       Objective:     Vital Signs (Most Recent):  Temp: 99.4 °F (37.4 °C) (10/15/23 0830)  Pulse: 106 (10/15/23 0830)  Resp: 18 (10/15/23 0830)  BP: (!) 141/77 (10/15/23 0830)  SpO2: 97 % (10/15/23 0830) Vital Signs (24h Range):  Temp:  [97.1 °F (36.2 °C)-99.9 °F (37.7 °C)] 99.4 °F (37.4 °C)  Pulse:  [] 106  Resp:  [16-18] 18  SpO2:  [94 %-98 %] 97 %  BP: (141-172)/(65-91) 141/77     Weight: 85.8 kg (189 lb 2.5 oz)  Height: 5' 10" (177.8 cm)  Body " mass index is 27.14 kg/m².     Physical Exam  Vitals and nursing note reviewed.   Constitutional:       General: He is not in acute distress.  HENT:      Head: Normocephalic.      Mouth/Throat:      Mouth: Mucous membranes are moist.   Eyes:      Extraocular Movements: Extraocular movements intact.      Conjunctiva/sclera: Conjunctivae normal.      Pupils: Pupils are equal, round, and reactive to light.   Cardiovascular:      Rate and Rhythm: Normal rate and regular rhythm.      Heart sounds: Normal heart sounds. No murmur heard.  Pulmonary:      Effort: Pulmonary effort is normal. No respiratory distress.      Breath sounds: Normal breath sounds.   Abdominal:      General: Bowel sounds are normal. There is no distension.      Palpations: Abdomen is soft.      Tenderness: There is abdominal tenderness (incisional, expected post op). There is no guarding or rebound.      Comments: Incision closed with dermabond, CDI, no SSI or leaking.  KEYA drain in place with serosanguinous output.     Musculoskeletal:         General: No swelling. Normal range of motion.      Cervical back: Normal range of motion.   Skin:     General: Skin is warm and dry.   Neurological:      Mental Status: He is alert and oriented to person, place, and time.      Motor: No weakness.   Psychiatric:         Mood and Affect: Mood normal.         Behavior: Behavior normal.         Thought Content: Thought content normal.         Judgment: Judgment normal.          Laboratory:  CBC:   Recent Labs   Lab 10/13/23  0612 10/13/23  1253 10/14/23  0011 10/14/23  0613 10/15/23  0703   WBC 10.13  --   --  6.85 3.89*   RBC 2.50*  --   --  2.41* 2.64*   HGB 7.6*   < > 7.5* 7.4* 8.0*   HCT 22.8*  --  22.7* 21.4* 23.7*     --   --  110* 103*   MCV 91  --   --  89 90   MCH 30.4  --   --  30.7 30.3   MCHC 33.3  --   --  34.6 33.8    < > = values in this interval not displayed.       CMP:   Recent Labs   Lab 10/13/23  1749 10/14/23  0011 10/14/23  0613  10/15/23  0703   * 266* 285* 99   CALCIUM 7.7* 7.7* 7.4* 7.9*   ALBUMIN 2.4*  --  2.2* 2.5*    136 138 139   K 4.6 4.4 4.4 4.2   CO2 19* 18* 22* 25   * 109 109 108   BUN 74* 65* 63* 39*   CREATININE 8.8* 7.3* 6.2* 3.1*       Labs within the past 24 hours have been reviewed.    Diagnostic Results:  US - Kidney: Results for orders placed during the hospital encounter of 10/12/23    US Transplant Kidney With Doppler    Narrative  EXAMINATION:  US TRANSPLANT KIDNEY WITH DOPPLER    CLINICAL HISTORY:  living donor kidney recipient requiring takeback for subcapsular hematoma evacuation;    TECHNIQUE:  Real time gray scale and doppler ultrasound was performed over the patient's renal allograft.    COMPARISON:  None    FINDINGS:  Status post renal transplant on 10/12/2023, with take back to the OR for drainage of a subcapsular hematoma on 10/13/2023 immediately prior to exam.    Renal allograft in the right lower quadrant.  The upper pole is not well visualized due to hemostatic products used in the OR.  Normal perfusion.  No hydronephrosis.  No fluid collections are apparent sonographically.  The bladder is not well visualized.    Vasculature:    Resistive indexes:    *Interlobar: 0.76    *Segmental upper: 0.78    *Segmental middle: 0.83    *Segmental lower: 0.79    Main renal artery peak systolic velocity: 223 cm/sec with normal waveform.    Renal artery/iliac ratio: 1.6.    The main renal vein is patent.    Impression  Satisfactory gray scale and doppler evaluation of renal allograft.    Electronically signed by resident: Chikis Crabtree  Date:    10/13/2023  Time:    05:53    Electronically signed by: Aiden Wells  Date:    10/13/2023  Time:    06:21

## 2023-10-15 NOTE — ANESTHESIA POSTPROCEDURE EVALUATION
Anesthesia Post Evaluation    Patient: Heath Hernandez    Procedure(s) Performed: Procedure(s) (LRB):  LAPAROTOMY, EXPLORATORY (N/A)  EXPLORATION, KIDNEY TRANSPLANT (N/A)  EVACUATION, HEMATOMA (N/A)    Final Anesthesia Type: general      Patient location during evaluation: PACU  Patient participation: Yes- Able to Participate  Level of consciousness: awake and alert  Post-procedure vital signs: reviewed and stable  Pain management: adequate  Airway patency: patent  DARYL mitigation strategies: Extubation and recovery carried out in lateral, semiupright, or other nonsupine position  PONV status at discharge: No PONV  Anesthetic complications: no      Cardiovascular status: blood pressure returned to baseline  Respiratory status: room air  Hydration status: euvolemic  Follow-up not needed.          Vitals Value Taken Time   /91 10/15/23 0522   Temp 37.7 °C (99.9 °F) 10/15/23 0522   Pulse 105 10/15/23 0522   Resp 18 10/15/23 0522   SpO2 94 % 10/15/23 0522         Event Time   Out of Recovery 10/13/2023 03:30:00         Pain/Keron Score: Pain Rating Prior to Med Admin: 0 (10/14/2023 12:46 PM)

## 2023-10-15 NOTE — ASSESSMENT & PLAN NOTE
- LRRT 10/12/23  - surgery went well, started making great UOP, labs clearing   - UOP dropped abruptly, Cr stopped clearing   - TB for ex lab, subcapsular hematoma evacuated with return of UOP; 2 day herrmann, 1 drain  - post TB labs stable, hgb dropped to <7, 1 unit PRBCs transfused   - strict Is and Os   - herrmann removed 10/14.    - KEYA drain output with 710cc/24hr.  Drain Cr ordered in setting of high output from KEYA drain  - Continue regular diet

## 2023-10-15 NOTE — PLAN OF CARE
He has ambulated from bed to chair to restroom independently today without issue. He is on a strict I&O Q2 hours - UOP 1160 ml, creatinine improved to 3.1. Telemetry was discontinued. Glucose  ACHS. Prograf was increased to 8 mg twice daily due to level being 2.3 ng/ml. He was given phosphorus PO due to low phosphorus. Short acting insulin was decreased to 3 units with meals. KEYA fluid sent for creatinine - results 4.2 - Provider notified. Wife pulled self medications with 100 accuracy.

## 2023-10-15 NOTE — ASSESSMENT & PLAN NOTE
Endocrinology consulted for BG management.   BG goal 140-180    - Levemir 8 units (20% reduction due to fasting blood glucose below goal.)  - Novolog 3 units with meals (20% reduction due to decreased steroid dosing and reduced dextrose intake IV)  - Novolog (Insulin Aspart) prn for BG excursions [facility] SSI (150/50)  - BG checks AC/HS  - Hypoglycemia protocol in place    ** Please notify Endocrine for any change and/or advance in diet**  ** Please call Endocrine for any BG related issues **    Discharge Planning:   TBD. Please notify endocrinology prior to discharge.

## 2023-10-15 NOTE — PROGRESS NOTES
[facility]  Endocrinology  Progress Note    Admit Date: 10/12/2023     Reason for Consult: Management of T2DM, Hyperglycemia     Surgical Procedure and Date: s/p kidney transplant on 10/12/2023    Diabetes diagnosis year: 2009    Home Diabetes Medications:  Tresiba (has not taken in over a year)    How often checking glucose at home? 1-3 x day   BG readings on regimen: 80's in the AM and 130's at lunch time.   Hypoglycemia on the regimen?  No  Missed doses on regimen?  No    Diabetes Complications include:     Hyperglycemia, Diabetic nephropathy  , and Diabetic chronic kidney disease         Complicating diabetes co morbidities:   Glucocorticoid use       HPI: Mr. Hernandez is a 62 y.o. year old White male who has been approved to receive a living related kidney transplant.  He has advanced kidney disease secondary to diabetic nephropathy and IgA nephropathy. Patient is on peritoneal dialysis. He received a LRRT 10/12/23 from his niece for IgA nephropathy and DMII. Endocrine consulted to manage hyperglycemia and type 2 diabetes.     Hemoglobin A1C   Date Value Ref Range Status   10/12/2023 5.1 4.0 - 5.6 % Final     Comment:     ADA Screening Guidelines:  5.7-6.4%  Consistent with prediabetes  >or=6.5%  Consistent with diabetes    High levels of fetal hemoglobin interfere with the HbA1C  assay. Heterozygous hemoglobin variants (HbS, HgC, etc)do  not significantly interfere with this assay.   However, presence of multiple variants may affect accuracy.     10/06/2021 6.6 (H) 4.0 - 5.6 % Final     Comment:     ADA Screening Guidelines:  5.7-6.4%  Consistent with prediabetes  >or=6.5%  Consistent with diabetes    High levels of fetal hemoglobin interfere with the HbA1C  assay. Heterozygous hemoglobin variants (HbS, HgC, etc)do  not significantly interfere with this assay.   However, presence of multiple variants may affect accuracy.                      Interval HPI:   Overnight events: No acute events  "overnight. Patient in room 89172/07910 A. Blood glucose improving. BG at and above goal on current insulin regimen (SSI ). Steroid use- Prednisone 20 mg. 2 Days Post-Op  Renal function- Abnormal - Creatinine 6.2   Vasopressors-  None       Endocrine will continue to follow and manage insulin orders inpatient.         Diet Adult Regular (IDDSI Level 7) Standard Tray     Eatin%  Nausea: No  Hypoglycemia and intervention: No  Fever: No  TPN and/or TF: No      BP (!) 145/77 (Patient Position: Lying)   Pulse 99   Temp 98.8 °F (37.1 °C) (Oral)   Resp 18   Ht 5' 10" (1.778 m)   Wt 85.8 kg (189 lb 2.5 oz)   SpO2 96%   BMI 27.14 kg/m²     Labs Reviewed and Include    Recent Labs   Lab 10/15/23  0703   GLU 99   CALCIUM 7.9*   ALBUMIN 2.5*      K 4.2   CO2 25      BUN 39*   CREATININE 3.1*     Lab Results   Component Value Date    WBC 3.89 (L) 10/15/2023    HGB 8.0 (L) 10/15/2023    HCT 23.7 (L) 10/15/2023    MCV 90 10/15/2023     (L) 10/15/2023     No results for input(s): "TSH", "FREET4" in the last 168 hours.  Lab Results   Component Value Date    HGBA1C 5.1 10/12/2023       Nutritional status:   Body mass index is 27.14 kg/m².  Lab Results   Component Value Date    ALBUMIN 2.5 (L) 10/15/2023    ALBUMIN 2.2 (L) 10/14/2023    ALBUMIN 2.4 (L) 10/13/2023     No results found for: "PREALBUMIN"    Estimated Creatinine Clearance: 25.8 mL/min (A) (based on SCr of 3.1 mg/dL (H)).    Accu-Checks  Recent Labs     10/12/23  1654 10/12/23  2126 10/13/23  0320 10/13/23  0833 10/13/23  1628 10/13/23  2036 10/14/23  1258 10/14/23  1723 10/14/23  2143 10/15/23  0838   POCTGLUCOSE 186* 169* 170* 174* 193* 232* 206* 196* 195* 113*       Current Medications and/or Treatments Impacting Glycemic Control  Immunotherapy:    Immunosuppressants           Stop Route Frequency     tacrolimus capsule 8 mg         -- Oral 2 times daily     tacrolimus capsule 2 mg         -- Oral Once     mycophenolate capsule 1,000 mg  "        -- Oral 2 times daily          Steroids:   Hormones (From admission, onward)      Start     Stop Route Frequency Ordered    10/15/23 0900  predniSONE tablet 20 mg  (IP TXP KIDNEY POST-OP THYMO WITH PERIPHERAL PRECHECKED)         -- Oral Daily 10/12/23 1346    10/14/23 0220  melatonin tablet 6 mg         -- Oral Nightly PRN 10/14/23 0121    10/12/23 1434  hydrocortisone sodium succinate injection 100 mg  (IP TXP KIDNEY POST-OP THYMO WITH PERIPHERAL PRECHECKED)         03/10/35 0534 IV Once as needed 10/12/23 1346          Pressors:    Autonomic Drugs (From admission, onward)      Start     Stop Route Frequency Ordered    10/12/23 1434  EPINEPHrine (PF) injection 1 mg  (IP TXP KIDNEY POST-OP THYMO WITH PERIPHERAL PRECHECKED)         03/10/35 0534 SubQ Once as needed 10/12/23 1346          Hyperglycemia/Diabetes Medications:   Antihyperglycemics (From admission, onward)      Start     Stop Route Frequency Ordered    10/16/23 0900  insulin detemir U-100 (Levemir) pen 8 Units         -- SubQ Daily 10/15/23 1002    10/15/23 1130  insulin aspart U-100 pen 3 Units         -- SubQ 3 times daily with meals 10/15/23 1002    10/14/23 1050  insulin aspart U-100 pen 0-5 Units         -- SubQ Before meals & nightly PRN 10/14/23 0950            ASSESSMENT and PLAN    Cardiac/Vascular  Benign hypertension  Uncontrolled HTN can worsen insulin resistance.       Renal/  * S/P kidney transplant  Optimize BG control to improve wound healing  Titrate insulin slowly to avoid hypoglycemia as the risk of hypoglycemia increases with decreased creatinine clearance.    Estimated Creatinine Clearance: 25.8 mL/min (A) (based on SCr of 3.1 mg/dL (H)).        Endocrine  Type 2 diabetes mellitus with renal complication  Endocrinology consulted for BG management.   BG goal 140-180    - Levemir 8 units (20% reduction due to fasting blood glucose below goal.)  - Novolog 3 units with meals (20% reduction due to decreased steroid dosing and  reduced dextrose intake IV)  - Novolog (Insulin Aspart) prn for BG excursions [facility] SSI (150/50)  - BG checks AC/HS  - Hypoglycemia protocol in place    ** Please notify Endocrine for any change and/or advance in diet**  ** Please call Endocrine for any BG related issues **    Discharge Planning:   TBD. Please notify endocrinology prior to discharge.        Palliative Care  Long-term use of immunosuppressant medication  Glucocorticoids markedly increase glucose levels. Expect the steroid taper will help glucose control.              Milind Vigil, DNP, FNP  Endocrinology  [facility]

## 2023-10-15 NOTE — PROGRESS NOTES
[facility]  Kidney Transplant  Progress Note      Reason for Follow-up: Reassessment of Kidney Transplant - 10/12/2023  (#1) recipient and management of immunosuppression.    ORGAN: LEFT KIDNEY    Donor Type: Living    Donor CMV Status:   Donor HBcAB:  Donor HCV Status:  Donor HBV IKE:   Donor HCV IKE:       Subjective:   History of Present Illness:  Mr. Hernandez is a 62 y.o. year old White male who has been approved to receive a living related kidney transplant.  He has advanced kidney disease secondary to diabetic nephropathy and IgA nephropathy. Patient is on peritoneal dialysis.         Hospital Course:  Mr. Hernandez is a 62 yo man who received a LRRT 10/12/23 from his niece for IgA nephropathy and DMII. PD cath removed as well. Surgery went well, 2 day herrmann, no drain. Post op labs stable, K a little elevated at 5.2 so shifted and given lasix with improvement in K on next lab check. Was making great UOP immediately post op.  On POD1 early AM, had acute drop in UOP.  Taken back to OR and found to have extensive subcapsular hematoma causing Yesika kidney.  After decompression, UOP and kidney function improving.     Interval History   This AM, patient feeling well.  Tolerated diet.  Pain controlled.  Urinating without difficulty after Herrmann removal.  Perinephric drain in place with serosanguinous output, 710cc/24hr.          Past Medical, Surgical, Family, and Social History:   Unchanged from H&P.    Scheduled Meds:   atorvastatin  10 mg Oral Daily    bisacodyL  10 mg Oral QHS    carvediloL  6.25 mg Oral BID    docusate sodium  100 mg Oral TID    famotidine  20 mg Oral QHS    heparin (porcine)  5,000 Units Subcutaneous Q8H    insulin aspart U-100  3 Units Subcutaneous TIDWM    [START ON 10/16/2023] insulin detemir U-100  8 Units Subcutaneous Daily    k phos di & mono-sod phos mono  2 tablet Oral BID    multivitamin  1 tablet Oral Daily    mupirocin  1 g Nasal BID    mycophenolate   1,000 mg Oral BID    predniSONE  20 mg Oral Daily    [START ON 10/22/2023] sulfamethoxazole-trimethoprim 400-80mg  1 tablet Oral Daily AM    tacrolimus  6 mg Oral BID    [START ON 10/22/2023] valGANciclovir  450 mg Oral Daily AM     Continuous Infusions:   glucagon (human recombinant)       PRN Meds:0.9%  NaCl infusion (for blood administration), dextrose 10%, diphenhydrAMINE, EPINEPHrine (PF), glucagon (human recombinant), glucagon (human recombinant), glucose, glucose, glucose, hydrALAZINE, hydrocortisone sodium succinate, insulin aspart U-100, labetalol, melatonin, ondansetron, oxyCODONE, sodium chloride 0.9%, sodium chloride 0.9%, traMADoL    Intake/Output - Last 3 Shifts         10/13 0700  10/14 0659 10/14 0700  10/15 0659 10/15 0700  10/16 0659    P.O. 1560 2342 620    I.V. (mL/kg) 2240.5 (27.5)      Blood 278      Other       IV Piggyback 948.6      Total Intake(mL/kg) 5027.1 (61.6) 2342 (27.3) 620 (7.2)    Urine (mL/kg/hr) 3050 (1.6) 3440 (1.7) 380 (1.2)    Emesis/NG output       Drains 660 710 70    Other       Stool 0 0     Blood       Total Output 3710 4150 450    Net +1317.1 -1808 +170           Stool Occurrence 0 x 2 x              Review of Systems   Constitutional:  Negative for appetite change, fatigue and fever.   HENT:  Negative for trouble swallowing.    Respiratory:  Negative for cough and shortness of breath.    Cardiovascular:  Negative for chest pain, palpitations and leg swelling.   Gastrointestinal:  Negative for abdominal distention, abdominal pain (incisional), diarrhea, nausea and vomiting.   Genitourinary:  Negative for decreased urine volume and dysuria.        Pickering   Skin:  Positive for wound.   Allergic/Immunologic: Positive for immunocompromised state.   Neurological:  Negative for dizziness and weakness.   Psychiatric/Behavioral:  Negative for confusion.       Objective:     Vital Signs (Most Recent):  Temp: 99.4 °F (37.4 °C) (10/15/23 0830)  Pulse: 106 (10/15/23 0830)  Resp:  "18 (10/15/23 0830)  BP: (!) 141/77 (10/15/23 0830)  SpO2: 97 % (10/15/23 0830) Vital Signs (24h Range):  Temp:  [97.1 °F (36.2 °C)-99.9 °F (37.7 °C)] 99.4 °F (37.4 °C)  Pulse:  [] 106  Resp:  [16-18] 18  SpO2:  [94 %-98 %] 97 %  BP: (141-172)/(65-91) 141/77     Weight: 85.8 kg (189 lb 2.5 oz)  Height: 5' 10" (177.8 cm)  Body mass index is 27.14 kg/m².    Physical Exam  Vitals and nursing note reviewed.   Constitutional:       General: He is not in acute distress.  HENT:      Head: Normocephalic.      Mouth/Throat:      Mouth: Mucous membranes are moist.   Eyes:      Extraocular Movements: Extraocular movements intact.      Conjunctiva/sclera: Conjunctivae normal.      Pupils: Pupils are equal, round, and reactive to light.   Cardiovascular:      Rate and Rhythm: Normal rate and regular rhythm.      Heart sounds: Normal heart sounds. No murmur heard.  Pulmonary:      Effort: Pulmonary effort is normal. No respiratory distress.      Breath sounds: Normal breath sounds.   Abdominal:      General: Bowel sounds are normal. There is no distension.      Palpations: Abdomen is soft.      Tenderness: There is abdominal tenderness (incisional, expected post op). There is no guarding or rebound.      Comments: Incision closed with dermabond, CDI, no SSI or leaking.  KEYA drain in place with serosanguinous output.     Musculoskeletal:         General: No swelling. Normal range of motion.      Cervical back: Normal range of motion.   Skin:     General: Skin is warm and dry.   Neurological:      Mental Status: He is alert and oriented to person, place, and time.      Motor: No weakness.   Psychiatric:         Mood and Affect: Mood normal.         Behavior: Behavior normal.         Thought Content: Thought content normal.         Judgment: Judgment normal.          Laboratory:  CBC:   Recent Labs   Lab 10/13/23  0612 10/13/23  1253 10/14/23  0011 10/14/23  0613 10/15/23  0703   WBC 10.13  --   --  6.85 3.89*   RBC 2.50*  --   " --  2.41* 2.64*   HGB 7.6*   < > 7.5* 7.4* 8.0*   HCT 22.8*  --  22.7* 21.4* 23.7*     --   --  110* 103*   MCV 91  --   --  89 90   MCH 30.4  --   --  30.7 30.3   MCHC 33.3  --   --  34.6 33.8    < > = values in this interval not displayed.       CMP:   Recent Labs   Lab 10/13/23  1749 10/14/23  0011 10/14/23  0613 10/15/23  0703   * 266* 285* 99   CALCIUM 7.7* 7.7* 7.4* 7.9*   ALBUMIN 2.4*  --  2.2* 2.5*    136 138 139   K 4.6 4.4 4.4 4.2   CO2 19* 18* 22* 25   * 109 109 108   BUN 74* 65* 63* 39*   CREATININE 8.8* 7.3* 6.2* 3.1*       Labs within the past 24 hours have been reviewed.    Diagnostic Results:  US - Kidney: Results for orders placed during the hospital encounter of 10/12/23    US Transplant Kidney With Doppler    Narrative  EXAMINATION:  US TRANSPLANT KIDNEY WITH DOPPLER    CLINICAL HISTORY:  living donor kidney recipient requiring takeback for subcapsular hematoma evacuation;    TECHNIQUE:  Real time gray scale and doppler ultrasound was performed over the patient's renal allograft.    COMPARISON:  None    FINDINGS:  Status post renal transplant on 10/12/2023, with take back to the OR for drainage of a subcapsular hematoma on 10/13/2023 immediately prior to exam.    Renal allograft in the right lower quadrant.  The upper pole is not well visualized due to hemostatic products used in the OR.  Normal perfusion.  No hydronephrosis.  No fluid collections are apparent sonographically.  The bladder is not well visualized.    Vasculature:    Resistive indexes:    *Interlobar: 0.76    *Segmental upper: 0.78    *Segmental middle: 0.83    *Segmental lower: 0.79    Main renal artery peak systolic velocity: 223 cm/sec with normal waveform.    Renal artery/iliac ratio: 1.6.    The main renal vein is patent.    Impression  Satisfactory gray scale and doppler evaluation of renal allograft.    Electronically signed by resident: Chikis  "Leyda  Date:    10/13/2023  Time:    05:53    Electronically signed by: Aiden Wells  Date:    10/13/2023  Time:    06:21    Assessment/Plan:     * S/P kidney transplant  - LRRT 10/12/23  - surgery went well, started making great UOP, labs clearing   - UOP dropped abruptly, Cr stopped clearing   - TB for ex lab, subcapsular hematoma evacuated with return of UOP; 2 day herrmann, 1 drain  - post TB labs stable, hgb dropped to <7, 1 unit PRBCs transfused   - strict Is and Os   - herrmann removed 10/14.    - KEYA drain output with 710cc/24hr.  Drain Cr ordered in setting of high output from KEYA drain  - Continue regular diet        At risk for opportunistic infections  - valcyte for CMV, bactrim for PJP       Prophylactic immunotherapy  - tac, MMF, pred   - monitor tac levels for toxicity and therapeutic efforts       Long-term use of immunosuppressant medication  - see "prophylactic immunotherapy"      Anemia, chronic disease  - h/h decreased but stable   - monitor with CBC       Acute blood loss anemia  - 2/2 transplant and takeback   - expected post op   - 1 unit 10/13   - keep T$S up to date   - see "anemia chronic disease"    Benign hypertension  - cont home meds as appropriate with hold parameters in place   - Continue Coreg      Type 2 diabetes mellitus with renal complication  - endocrine consulted, appreciate assistance           Discharge Planning:  Not medically ready for discharge  Medical decision making for this encounter includes review of pertinent labs and diagnostic studies, assessment and planning, discussions with consulting providers, discussion with patient/family, and participation in multidisciplinary rounds. Time spent caring for patient: 30 minutes    HOLDEN AGUIRRE MD  Kidney Transplant  [facility]  "

## 2023-10-16 PROBLEM — E83.39 HYPOPHOSPHATEMIA: Status: ACTIVE | Noted: 2023-10-16

## 2023-10-16 LAB
ABO + RH BLD: NORMAL
ALBUMIN SERPL BCP-MCNC: 2.3 G/DL (ref 3.5–5.2)
ANION GAP SERPL CALC-SCNC: 6 MMOL/L (ref 8–16)
BASOPHILS # BLD AUTO: 0 K/UL (ref 0–0.2)
BASOPHILS NFR BLD: 0 % (ref 0–1.9)
BLD GP AB SCN CELLS X3 SERPL QL: NORMAL
BLD PROD TYP BPU: NORMAL
BLOOD UNIT EXPIRATION DATE: NORMAL
BLOOD UNIT TYPE CODE: 5100
BLOOD UNIT TYPE: NORMAL
BUN SERPL-MCNC: 34 MG/DL (ref 8–23)
CALCIUM SERPL-MCNC: 7.8 MG/DL (ref 8.7–10.5)
CHLORIDE SERPL-SCNC: 111 MMOL/L (ref 95–110)
CO2 SERPL-SCNC: 23 MMOL/L (ref 23–29)
CODING SYSTEM: NORMAL
CREAT SERPL-MCNC: 2.1 MG/DL (ref 0.5–1.4)
CREAT UR-MCNC: 63 MG/DL (ref 23–375)
CROSSMATCH INTERPRETATION: NORMAL
DIFFERENTIAL METHOD: ABNORMAL
DISPENSE STATUS: NORMAL
EOSINOPHIL # BLD AUTO: 0 K/UL (ref 0–0.5)
EOSINOPHIL NFR BLD: 0.9 % (ref 0–8)
ERYTHROCYTE [DISTWIDTH] IN BLOOD BY AUTOMATED COUNT: 14.2 % (ref 11.5–14.5)
EST. GFR  (NO RACE VARIABLE): 35.2 ML/MIN/1.73 M^2
GLUCOSE SERPL-MCNC: 102 MG/DL (ref 70–110)
HCT VFR BLD AUTO: 20.5 % (ref 40–54)
HGB BLD-MCNC: 6.8 G/DL (ref 14–18)
IMM GRANULOCYTES # BLD AUTO: 0.02 K/UL (ref 0–0.04)
IMM GRANULOCYTES NFR BLD AUTO: 0.6 % (ref 0–0.5)
LYMPHOCYTES # BLD AUTO: 0.1 K/UL (ref 1–4.8)
LYMPHOCYTES NFR BLD: 2.1 % (ref 18–48)
MAGNESIUM SERPL-MCNC: 1.9 MG/DL (ref 1.6–2.6)
MCH RBC QN AUTO: 30 PG (ref 27–31)
MCHC RBC AUTO-ENTMCNC: 33.2 G/DL (ref 32–36)
MCV RBC AUTO: 90 FL (ref 82–98)
MONOCYTES # BLD AUTO: 0.2 K/UL (ref 0.3–1)
MONOCYTES NFR BLD: 4.7 % (ref 4–15)
NEUTROPHILS # BLD AUTO: 3.1 K/UL (ref 1.8–7.7)
NEUTROPHILS NFR BLD: 91.7 % (ref 38–73)
NRBC BLD-RTO: 0 /100 WBC
NUM UNITS TRANS PACKED RBC: NORMAL
PHOSPHATE SERPL-MCNC: 1.9 MG/DL (ref 2.7–4.5)
PLATELET # BLD AUTO: 98 K/UL (ref 150–450)
PMV BLD AUTO: 11.1 FL (ref 9.2–12.9)
POCT GLUCOSE: 121 MG/DL (ref 70–110)
POCT GLUCOSE: 144 MG/DL (ref 70–110)
POCT GLUCOSE: 158 MG/DL (ref 70–110)
POCT GLUCOSE: 223 MG/DL (ref 70–110)
POTASSIUM SERPL-SCNC: 4.3 MMOL/L (ref 3.5–5.1)
PROT UR-MCNC: 94 MG/DL (ref 0–15)
PROT/CREAT UR: 1.49 MG/G{CREAT} (ref 0–0.2)
RBC # BLD AUTO: 2.27 M/UL (ref 4.6–6.2)
SODIUM SERPL-SCNC: 140 MMOL/L (ref 136–145)
SPECIMEN OUTDATE: NORMAL
TACROLIMUS BLD-MCNC: 2.8 NG/ML (ref 5–15)
WBC # BLD AUTO: 3.39 K/UL (ref 3.9–12.7)

## 2023-10-16 PROCEDURE — 86900 BLOOD TYPING SEROLOGIC ABO: CPT | Performed by: PHYSICIAN ASSISTANT

## 2023-10-16 PROCEDURE — 99233 SBSQ HOSP IP/OBS HIGH 50: CPT | Mod: 24,,, | Performed by: PHYSICIAN ASSISTANT

## 2023-10-16 PROCEDURE — 85025 COMPLETE CBC W/AUTO DIFF WBC: CPT

## 2023-10-16 PROCEDURE — 63600175 PHARM REV CODE 636 W HCPCS: Performed by: STUDENT IN AN ORGANIZED HEALTH CARE EDUCATION/TRAINING PROGRAM

## 2023-10-16 PROCEDURE — 20600001 HC STEP DOWN PRIVATE ROOM

## 2023-10-16 PROCEDURE — 25000003 PHARM REV CODE 250: Performed by: NURSE PRACTITIONER

## 2023-10-16 PROCEDURE — 99232 SBSQ HOSP IP/OBS MODERATE 35: CPT | Mod: ,,,

## 2023-10-16 PROCEDURE — 80069 RENAL FUNCTION PANEL: CPT

## 2023-10-16 PROCEDURE — 36415 COLL VENOUS BLD VENIPUNCTURE: CPT | Performed by: PHYSICIAN ASSISTANT

## 2023-10-16 PROCEDURE — 25000003 PHARM REV CODE 250

## 2023-10-16 PROCEDURE — 63600175 PHARM REV CODE 636 W HCPCS: Performed by: INTERNAL MEDICINE

## 2023-10-16 PROCEDURE — 63600175 PHARM REV CODE 636 W HCPCS: Performed by: PHYSICIAN ASSISTANT

## 2023-10-16 PROCEDURE — 25000003 PHARM REV CODE 250: Performed by: INTERNAL MEDICINE

## 2023-10-16 PROCEDURE — 99233 PR SUBSEQUENT HOSPITAL CARE,LEVL III: ICD-10-PCS | Mod: 24,,, | Performed by: PHYSICIAN ASSISTANT

## 2023-10-16 PROCEDURE — 83735 ASSAY OF MAGNESIUM: CPT

## 2023-10-16 PROCEDURE — 86920 COMPATIBILITY TEST SPIN: CPT | Performed by: PHYSICIAN ASSISTANT

## 2023-10-16 PROCEDURE — 63600175 PHARM REV CODE 636 W HCPCS

## 2023-10-16 PROCEDURE — 36415 COLL VENOUS BLD VENIPUNCTURE: CPT

## 2023-10-16 PROCEDURE — 80197 ASSAY OF TACROLIMUS: CPT

## 2023-10-16 PROCEDURE — 84156 ASSAY OF PROTEIN URINE: CPT | Performed by: PHYSICIAN ASSISTANT

## 2023-10-16 PROCEDURE — 25000003 PHARM REV CODE 250: Performed by: PHYSICIAN ASSISTANT

## 2023-10-16 PROCEDURE — P9016 RBC LEUKOCYTES REDUCED: HCPCS | Performed by: PHYSICIAN ASSISTANT

## 2023-10-16 PROCEDURE — 99232 PR SUBSEQUENT HOSPITAL CARE,LEVL II: ICD-10-PCS | Mod: ,,,

## 2023-10-16 RX ORDER — HYDROCODONE BITARTRATE AND ACETAMINOPHEN 500; 5 MG/1; MG/1
TABLET ORAL
Status: DISCONTINUED | OUTPATIENT
Start: 2023-10-16 | End: 2023-10-16

## 2023-10-16 RX ORDER — CALCITRIOL 0.5 UG/1
0.5 CAPSULE ORAL DAILY
Status: DISCONTINUED | OUTPATIENT
Start: 2023-10-16 | End: 2023-10-17 | Stop reason: HOSPADM

## 2023-10-16 RX ADMIN — ATORVASTATIN CALCIUM 10 MG: 10 TABLET, FILM COATED ORAL at 08:10

## 2023-10-16 RX ADMIN — THERA TABS 1 TABLET: TAB at 08:10

## 2023-10-16 RX ADMIN — FAMOTIDINE 20 MG: 20 TABLET ORAL at 08:10

## 2023-10-16 RX ADMIN — INSULIN ASPART 3 UNITS: 100 INJECTION, SOLUTION INTRAVENOUS; SUBCUTANEOUS at 01:10

## 2023-10-16 RX ADMIN — TACROLIMUS 8 MG: 1 CAPSULE ORAL at 08:10

## 2023-10-16 RX ADMIN — CARVEDILOL 12.5 MG: 12.5 TABLET, FILM COATED ORAL at 08:10

## 2023-10-16 RX ADMIN — DOCUSATE SODIUM 100 MG: 100 CAPSULE, LIQUID FILLED ORAL at 08:10

## 2023-10-16 RX ADMIN — PREDNISONE 20 MG: 20 TABLET ORAL at 08:10

## 2023-10-16 RX ADMIN — INSULIN ASPART 3 UNITS: 100 INJECTION, SOLUTION INTRAVENOUS; SUBCUTANEOUS at 05:10

## 2023-10-16 RX ADMIN — DIBASIC SODIUM PHOSPHATE, MONOBASIC POTASSIUM PHOSPHATE AND MONOBASIC SODIUM PHOSPHATE 2 TABLET: 852; 155; 130 TABLET ORAL at 08:10

## 2023-10-16 RX ADMIN — CALCITRIOL 0.5 MCG: 0.5 CAPSULE, LIQUID FILLED ORAL at 01:10

## 2023-10-16 RX ADMIN — TACROLIMUS 9 MG: 5 CAPSULE ORAL at 05:10

## 2023-10-16 RX ADMIN — INSULIN ASPART 2 UNITS: 100 INJECTION, SOLUTION INTRAVENOUS; SUBCUTANEOUS at 05:10

## 2023-10-16 RX ADMIN — HEPARIN SODIUM 5000 UNITS: 5000 INJECTION INTRAVENOUS; SUBCUTANEOUS at 05:10

## 2023-10-16 RX ADMIN — KETOCONAZOLE 100 MG: 200 TABLET ORAL at 01:10

## 2023-10-16 RX ADMIN — MYCOPHENOLATE MOFETIL 1000 MG: 250 CAPSULE ORAL at 08:10

## 2023-10-16 RX ADMIN — OXYCODONE HYDROCHLORIDE 5 MG: 5 TABLET ORAL at 09:10

## 2023-10-16 RX ADMIN — MUPIROCIN 1 G: 20 OINTMENT TOPICAL at 08:10

## 2023-10-16 RX ADMIN — HEPARIN SODIUM 5000 UNITS: 5000 INJECTION INTRAVENOUS; SUBCUTANEOUS at 08:10

## 2023-10-16 RX ADMIN — HYDRALAZINE HYDROCHLORIDE 10 MG: 20 INJECTION, SOLUTION INTRAMUSCULAR; INTRAVENOUS at 07:10

## 2023-10-16 RX ADMIN — HEPARIN SODIUM 5000 UNITS: 5000 INJECTION INTRAVENOUS; SUBCUTANEOUS at 01:10

## 2023-10-16 RX ADMIN — INSULIN ASPART 3 UNITS: 100 INJECTION, SOLUTION INTRAVENOUS; SUBCUTANEOUS at 08:10

## 2023-10-16 NOTE — SUBJECTIVE & OBJECTIVE
Subjective:   History of Present Illness:  Mr. Hernandez is a 62 y.o. year old White male who has been approved to receive a living related kidney transplant.  He has advanced kidney disease secondary to diabetic nephropathy and IgA nephropathy. Patient is on peritoneal dialysis.     Mr. Hernandez is a 63 y.o. year old male who is status post Kidney Transplant - 10/12/2023  (#1).  His maintenance immunosuppression consists of:   Immunosuppressants (From admission, onward)      Start     Stop Route Frequency Ordered    10/16/23 1800  tacrolimus capsule 9 mg  (IP TXP KIDNEY POST-OP THYMO WITH PERIPHERAL PRECHECKED)         -- Oral 2 times daily 10/16/23 1255    10/12/23 1400  mycophenolate capsule 1,000 mg  (IP TXP KIDNEY POST-OP THYMO WITH PERIPHERAL PRECHECKED)         -- Oral 2 times daily 10/12/23 1346            Hospital Course:  Mr. Hernandez is a 62 yo man who received a LRRT 10/12/23 from his niece for IgA nephropathy and DMII. PD cath removed as well. Surgery went well, 2 day herrmann, no drain. Post op labs stable, K a little elevated at 5.2 so shifted and given lasix with improvement in K on next lab check. Was making great UOP immediately post op.  On POD1 early AM, had acute drop in UOP.  Taken back to OR 10/13 and found to have extensive subcapsular hematoma causing Yesika kidney.  After decompression, UOP and kidney function improving.     Interval Hx: NAEON. H/H down trending. Transfused 1u prbc. Kidney US showing 9.7 x 2.5 x 5.4 cm collection. Kidney function continues to improve. Making good urine. He is overall doing well. HDS. Ambulating well. Electrolytes replaced. Tentative d/c tomorrow pending stability of blood counts.     Past Medical, Surgical, Family, and Social History:   Unchanged from H&P.    Scheduled Meds:   atorvastatin  10 mg Oral Daily    bisacodyL  10 mg Oral QHS    calcitRIOL  0.5 mcg Oral Daily    carvediloL  12.5 mg Oral BID    docusate sodium  100 mg Oral TID    famotidine  20 mg Oral  QHS    heparin (porcine)  5,000 Units Subcutaneous Q8H    insulin aspart U-100  3 Units Subcutaneous TIDWM    insulin detemir U-100  8 Units Subcutaneous Daily    k phos di & mono-sod phos mono  2 tablet Oral BID    ketoconazole  100 mg Oral Daily    multivitamin  1 tablet Oral Daily    mupirocin  1 g Nasal BID    mycophenolate  1,000 mg Oral BID    predniSONE  20 mg Oral Daily    [START ON 10/22/2023] sulfamethoxazole-trimethoprim 400-80mg  1 tablet Oral Daily AM    tacrolimus  9 mg Oral BID    [START ON 10/22/2023] valGANciclovir  450 mg Oral Daily AM     Continuous Infusions:   glucagon (human recombinant)       PRN Meds:0.9%  NaCl infusion (for blood administration), 0.9%  NaCl infusion (for blood administration), dextrose 10%, diphenhydrAMINE, EPINEPHrine (PF), glucagon (human recombinant), glucagon (human recombinant), glucose, glucose, glucose, hydrALAZINE, hydrocortisone sodium succinate, insulin aspart U-100, labetalol, melatonin, ondansetron, oxyCODONE, sodium chloride 0.9%, sodium chloride 0.9%, traMADoL    Intake/Output - Last 3 Shifts         10/14 0700  10/15 0659 10/15 0700  10/16 0659 10/16 0700  10/17 0659    P.O. 2342 1880     I.V. (mL/kg)       Blood       IV Piggyback       Total Intake(mL/kg) 2342 (27.3) 1880 (22.6)     Urine (mL/kg/hr) 3440 (1.7) 3210 (1.6) 600 (1)    Drains 710 785     Stool 0 0     Total Output 4150 3995 600    Net -1808 -2115 -600           Stool Occurrence 2 x 1 x              Review of Systems   Constitutional:  Negative for appetite change, fatigue and fever.   HENT:  Negative for trouble swallowing.    Respiratory:  Negative for cough and shortness of breath.    Cardiovascular:  Negative for chest pain, palpitations and leg swelling.   Gastrointestinal:  Positive for abdominal pain (incisional). Negative for abdominal distention, diarrhea, nausea and vomiting.   Genitourinary:  Negative for decreased urine volume and dysuria.        Pickering   Skin:  Positive for wound.  "  Allergic/Immunologic: Positive for immunocompromised state.   Neurological:  Negative for dizziness and weakness.   Psychiatric/Behavioral:  Negative for confusion.       Objective:     Vital Signs (Most Recent):  Temp: 97.9 °F (36.6 °C) (10/16/23 1130)  Pulse: 86 (10/16/23 1130)  Resp: 17 (10/16/23 1130)  BP: (!) 151/77 (10/16/23 1130)  SpO2: 99 % (10/16/23 1130) Vital Signs (24h Range):  Temp:  [97.8 °F (36.6 °C)-98.9 °F (37.2 °C)] 97.9 °F (36.6 °C)  Pulse:  [] 86  Resp:  [16-18] 17  SpO2:  [95 %-99 %] 99 %  BP: (148-185)/(74-95) 151/77     Weight: 83.3 kg (183 lb 12.1 oz)  Height: 5' 10" (177.8 cm)  Body mass index is 26.37 kg/m².     Physical Exam  Vitals and nursing note reviewed.   Constitutional:       General: He is not in acute distress.  HENT:      Head: Normocephalic.      Mouth/Throat:      Mouth: Mucous membranes are moist.   Eyes:      Extraocular Movements: Extraocular movements intact.      Conjunctiva/sclera: Conjunctivae normal.      Pupils: Pupils are equal, round, and reactive to light.   Cardiovascular:      Rate and Rhythm: Normal rate and regular rhythm.      Heart sounds: Normal heart sounds. No murmur heard.  Pulmonary:      Effort: Pulmonary effort is normal. No respiratory distress.      Breath sounds: Normal breath sounds.   Abdominal:      General: Bowel sounds are normal. There is no distension.      Palpations: Abdomen is soft.      Tenderness: There is abdominal tenderness (incisional, expected post op). There is no guarding or rebound.      Comments: Incision closed with dermabond, CDI, no SSI or leaking.  KEYA drain in place with serosanguinous output.     Musculoskeletal:         General: No swelling. Normal range of motion.      Cervical back: Normal range of motion.   Skin:     General: Skin is warm and dry.   Neurological:      Mental Status: He is alert and oriented to person, place, and time.      Motor: No weakness.   Psychiatric:         Mood and Affect: Mood normal.   "       Behavior: Behavior normal.         Thought Content: Thought content normal.         Judgment: Judgment normal.          Laboratory:  CBC:   Recent Labs   Lab 10/14/23  0613 10/15/23  0703 10/16/23  0510   WBC 6.85 3.89* 3.39*   RBC 2.41* 2.64* 2.27*   HGB 7.4* 8.0* 6.8*   HCT 21.4* 23.7* 20.5*   * 103* 98*   MCV 89 90 90   MCH 30.7 30.3 30.0   MCHC 34.6 33.8 33.2     CMP:   Recent Labs   Lab 10/14/23  0613 10/15/23  0703 10/16/23  0510   * 99 102   CALCIUM 7.4* 7.9* 7.8*   ALBUMIN 2.2* 2.5* 2.3*    139 140   K 4.4 4.2 4.3   CO2 22* 25 23    108 111*   BUN 63* 39* 34*   CREATININE 6.2* 3.1* 2.1*     Labs within the past 24 hours have been reviewed.    Diagnostic Results:  US - Kidney: Results for orders placed during the hospital encounter of 10/12/23    US Transplant Kidney With Doppler    Narrative  EXAMINATION:  US TRANSPLANT KIDNEY WITH DOPPLER    CLINICAL HISTORY:  assess bleed;    TECHNIQUE:  Real time gray scale and doppler ultrasound was performed over the patient's renal allograft.    COMPARISON:  Ultrasound from 10/13/2023    FINDINGS:  Renal allograft in the right lower quadrant.  The allograft measures 12.6 cm. Normal perfusion.  Stent is present.  No hydronephrosis.    Fluid collection superficial to the mid to inferior pole of the allograft measuring 9.7 x 2.5 x 5.4 cm.    Vasculature:    Resistive indices ranged from 0.73 to 0.78.    Main renal artery peak systolic velocity: 282cm/sec with normal waveform.    Renal artery/iliac ratio: 1.4.    The main renal vein is patent.    Impression  Satisfactory gray scale and doppler evaluation of renal allograft.    Heterogeneous fluid collection adjacent to the inferior pole, likely hematoma versus complicated seroma.  This does not appear to be a subcapsular hematoma and is superficial to the allograft.  No significant mass effect.      Electronically signed by: Diego James MD  Date:    10/16/2023  Time:    10:49

## 2023-10-16 NOTE — PROGRESS NOTES
"[facility]  Adult Nutrition  Progress Note    SUMMARY       Recommendations    1. Continue Regular diet      2. If PO intake <50%, add Boost Plus BID      3. RD to monitor and follow    Goals: Meet % EEN, EPN by RD f/u  Nutrition Goal Status: new  Communication of RD Recs:  (POC)    Assessment and Plan    Nutrition Problem  Increased nutrient needs (protein, energy)     Related to (etiology):   Increased physiological demands     Signs and Symptoms (as evidenced by):   S/p Kidney transplant (10/13)     Interventions/Recommendations (treatment strategy):  Collaboration with other providers  Content related nutrition education     Nutrition Diagnosis Status:   Continue    Reason for Assessment    Reason For Assessment: RD follow-up  Diagnosis:  (s/p kidney tx)  Relevant Medical History: T2DM, HTN  Interdisciplinary Rounds: did not attend    General Information Comments:  RD f/u. S/p Kidney transplant (10/13). Pt reports good appetite, ate 100% lunch. Denies N/V, chewing/swallowing difficulties. + BM. Kidney function improving. #. No s/s of malnutrition at this time, appears well-nourished. Reiterated post tx nutrition education. All questions has been answered.     Nutrition Discharge Planning: Post transplant nutrition education provided on 10/13. Food safety/drug interactions emphasized. General healthy/low salt diet recommended. Education material left at bedside. No other needs identified.    Nutrition Risk Screen    Nutrition Risk Screen: no indicators present    Nutrition/Diet History    Patient Reported Diet/Restrictions/Preferences: renal  Spiritual, Cultural Beliefs, [employer] Practices, Values that Affect Care: no  Food Allergies: NKFA  Factors Affecting Nutritional Intake: None identified at this time    Anthropometrics    Temp: 98.7 °F (37.1 °C)  Height Method: Stated  Height: 5' 10" (177.8 cm)  Height (inches): 70 in  Weight Method: Standard Scale  Weight: 83.3 kg (183 lb " 12.1 oz)  Weight (lb): 183.76 lb  Ideal Body Weight (IBW), Male: 166 lb  % Ideal Body Weight, Male (lb): 108.37 %  BMI (Calculated): 26.4  BMI Grade: 25 - 29.9 - overweight     Lab/Procedures/Meds    Pertinent Labs Reviewed: reviewed  Pertinent Labs Comments: BUN 34, Cr 2.1, albumin 2.3, P 1.9, eGFR 35.2  Pertinent Medications Reviewed: reviewed  Pertinent Medications Comments: tacrolimus, calcitriol, insulin, k phos, prednisone, heparin, mycophenolate, famotidine, bisacodyl, docusate, MV, atorvastatin    Estimated/Assessed Needs    Weight Used For Calorie Calculations: 81.6 kg (179 lb 14.3 oz)  Energy Calorie Requirements (kcal): 1953 kcals  Energy Need Method: [place]-St Hungor (MSJ x 1.2 PAL)  Protein Requirements: 98 g (1.2 g/kg)  Weight Used For Protein Calculations: 81.6 kg (179 lb 14.3 oz)  Fluid Requirements (mL): 1 mL/kcal or fluid per MD  Estimated Fluid Requirement Method: RDA Method  RDA Method (mL): 1953  CHO Requirement: 245 g    Nutrition Prescription Ordered    Current Diet Order: Regular    Evaluation of Received Nutrient/Fluid Intake    I/O: + 1.7 L since admit  Energy Calories Required: meeting needs  Protein Required: meeting needs  Comments: LBM 10/15  Tolerance: tolerating  % Intake of Estimated Energy Needs: 75 - 100 %  % Meal Intake: 75 - 100 %    Nutrition Risk    Level of Risk/Frequency of Follow-up:  (1x/week)     Monitor and Evaluation    Food and Nutrient Intake: energy intake, food and beverage intake  Food and Nutrient Adminstration: diet order  Knowledge/Beliefs/Attitudes: food and nutrition knowledge/skill  Physical Activity and Function: nutrition-related ADLs and IADLs  Anthropometric Measurements: height/length, weight, weight change, body mass index  Biochemical Data, Medical Tests and Procedures: electrolyte and renal panel, gastrointestinal profile, glucose/endocrine profile, inflammatory profile, lipid profile  Nutrition-Focused Physical Findings: overall appearance      Nutrition Follow-Up    RD Follow-up?: Yes

## 2023-10-16 NOTE — ASSESSMENT & PLAN NOTE
Endocrinology consulted for BG management.   BG goal 140-180    - Levemir 8 units   - Novolog 3 units with meals  - Novolog (Insulin Aspart) prn for BG excursions LDC SSI (150/50)  - BG checks AC/HS  - Hypoglycemia protocol in place    ** Please notify Endocrine for any change and/or advance in diet**  ** Please call Endocrine for any BG related issues **    Discharge Planning:   TBD. Please notify endocrinology prior to discharge.

## 2023-10-16 NOTE — PLAN OF CARE
Patient is AAOx3. RLQ incision with derma bond, leaking ss fluid. RLQ KEYA to bulb suction with ss output is leaking from the site. Patient received 1 unit of PRBCs today. Patient had a STAT US this morning. Monitoring the patient's blood sugar AC&HS. Blue card updated and meds filled. Patient's with is doing well with self meds. Reminded the patient to call for assistance.

## 2023-10-16 NOTE — ASSESSMENT & PLAN NOTE
"- 2/2 transplant and takeback   - expected post op   - 1 unit 10/13   - Transfused 1u prbc 10/16  - keep T&S up to date   - see "anemia chronic disease"  "

## 2023-10-16 NOTE — ASSESSMENT & PLAN NOTE
Optimize BG control to improve wound healing  Titrate insulin slowly to avoid hypoglycemia as the risk of hypoglycemia increases with decreased creatinine clearance.    Estimated Creatinine Clearance: 38.1 mL/min (A) (based on SCr of 2.1 mg/dL (H)).

## 2023-10-16 NOTE — SUBJECTIVE & OBJECTIVE
"Interval HPI:   No acute events overnight. Patient in room 41883/02667 A. Blood glucose stable. BG at and below goal on current insulin regimen (SSI, prandial, and basal insulin ). Steroid use- Prednisone 20 mg QD.   3 Days Post-Op  Renal function- Abnormal - Cr 2.1   Vasopressors-  None     Diet Adult Regular (IDDSI Level 7) Standard Tray     Eatin%  Nausea: No  Hypoglycemia and intervention: No  Fever: No  TPN and/or TF: No    BP (!) 148/74 (BP Location: Right arm, Patient Position: Sitting)   Pulse 99   Temp 97.8 °F (36.6 °C) (Oral)   Resp 17   Ht 5' 10" (1.778 m)   Wt 83.3 kg (183 lb 12.1 oz)   SpO2 97%   BMI 26.37 kg/m²     Labs Reviewed and Include    Recent Labs   Lab 10/16/23  0510      CALCIUM 7.8*   ALBUMIN 2.3*      K 4.3   CO2 23   *   BUN 34*   CREATININE 2.1*     Lab Results   Component Value Date    WBC 3.39 (L) 10/16/2023    HGB 6.8 (L) 10/16/2023    HCT 20.5 (L) 10/16/2023    MCV 90 10/16/2023    PLT 98 (L) 10/16/2023     No results for input(s): "TSH", "FREET4" in the last 168 hours.  Lab Results   Component Value Date    HGBA1C 5.1 10/12/2023       Nutritional status:   Body mass index is 26.37 kg/m².  Lab Results   Component Value Date    ALBUMIN 2.3 (L) 10/16/2023    ALBUMIN 2.5 (L) 10/15/2023    ALBUMIN 2.2 (L) 10/14/2023     No results found for: "PREALBUMIN"    Estimated Creatinine Clearance: 38.1 mL/min (A) (based on SCr of 2.1 mg/dL (H)).    Accu-Checks  Recent Labs     10/13/23  1628 10/13/23  2036 10/14/23  1258 10/14/23  1723 10/14/23  2143 10/15/23  0838 10/15/23  1239 10/15/23  1721 10/15/23  2132 10/16/23  0852   POCTGLUCOSE 193* 232* 206* 196* 195* 113* 125* 188* 141* 121*       Current Medications and/or Treatments Impacting Glycemic Control  Immunotherapy:    Immunosuppressants           Stop Route Frequency     tacrolimus capsule 8 mg         -- Oral 2 times daily     mycophenolate capsule 1,000 mg         -- Oral 2 times daily          Steroids: "   Hormones (From admission, onward)      Start     Stop Route Frequency Ordered    10/15/23 0900  predniSONE tablet 20 mg  (IP TXP KIDNEY POST-OP THYMO WITH PERIPHERAL PRECHECKED)         -- Oral Daily 10/12/23 1346    10/14/23 0220  melatonin tablet 6 mg         -- Oral Nightly PRN 10/14/23 0121    10/12/23 1434  hydrocortisone sodium succinate injection 100 mg  (IP TXP KIDNEY POST-OP THYMO WITH PERIPHERAL PRECHECKED)         03/10/35 0534 IV Once as needed 10/12/23 1346          Pressors:    Autonomic Drugs (From admission, onward)      Start     Stop Route Frequency Ordered    10/12/23 1434  EPINEPHrine (PF) injection 1 mg  (IP TXP KIDNEY POST-OP THYMO WITH PERIPHERAL PRECHECKED)         03/10/35 0534 SubQ Once as needed 10/12/23 1346          Hyperglycemia/Diabetes Medications:   Antihyperglycemics (From admission, onward)      Start     Stop Route Frequency Ordered    10/16/23 0900  insulin detemir U-100 (Levemir) pen 8 Units         -- SubQ Daily 10/15/23 1002    10/15/23 1130  insulin aspart U-100 pen 3 Units         -- SubQ 3 times daily with meals 10/15/23 1002    10/14/23 1050  insulin aspart U-100 pen 0-5 Units         -- SubQ Before meals & nightly PRN 10/14/23 0950

## 2023-10-16 NOTE — PROGRESS NOTES
[facility]  Kidney Transplant  Progress Note      Reason for Follow-up: Reassessment of Kidney Transplant - 10/12/2023  (#1) recipient and management of immunosuppression.    ORGAN: LEFT KIDNEY    Donor Type: Living    Donor CMV Status:   Donor HBcAB:  Donor HCV Status:  Donor HBV IKE:   Donor HCV IKE:       Subjective:   History of Present Illness:  Mr. Hernandez is a 62 y.o. year old White male who has been approved to receive a living related kidney transplant.  He has advanced kidney disease secondary to diabetic nephropathy and IgA nephropathy. Patient is on peritoneal dialysis.     Mr. Hernandez is a 63 y.o. year old male who is status post Kidney Transplant - 10/12/2023  (#1).  His maintenance immunosuppression consists of:   Immunosuppressants (From admission, onward)      Start     Stop Route Frequency Ordered    10/16/23 1800  tacrolimus capsule 9 mg  (IP TXP KIDNEY POST-OP THYMO WITH PERIPHERAL PRECHECKED)         -- Oral 2 times daily 10/16/23 1255    10/12/23 1400  mycophenolate capsule 1,000 mg  (IP TXP KIDNEY POST-OP THYMO WITH PERIPHERAL PRECHECKED)         -- Oral 2 times daily 10/12/23 1346            Hospital Course:  Mr. Hernandez is a 64 yo man who received a LRRT 10/12/23 from his niece for IgA nephropathy and DMII. PD cath removed as well. Surgery went well, 2 day herrmann, no drain. Post op labs stable, K a little elevated at 5.2 so shifted and given lasix with improvement in K on next lab check. Was making great UOP immediately post op.  On POD1 early AM, had acute drop in UOP.  Taken back to OR 10/13 and found to have extensive subcapsular hematoma causing Yesika kidney.  After decompression, UOP and kidney function improving.     Interval Hx: NAEON. H/H down trending. Transfused 1u prbc. Kidney US showing 9.7 x 2.5 x 5.4 cm collection. Kidney function continues to improve. Making good urine. He is overall doing well. HDS. Ambulating well. Electrolytes replaced. Tentative d/c  tomorrow pending stability of blood counts.     Past Medical, Surgical, Family, and Social History:   Unchanged from H&P.    Scheduled Meds:   atorvastatin  10 mg Oral Daily    bisacodyL  10 mg Oral QHS    calcitRIOL  0.5 mcg Oral Daily    carvediloL  12.5 mg Oral BID    docusate sodium  100 mg Oral TID    famotidine  20 mg Oral QHS    heparin (porcine)  5,000 Units Subcutaneous Q8H    insulin aspart U-100  3 Units Subcutaneous TIDWM    insulin detemir U-100  8 Units Subcutaneous Daily    k phos di & mono-sod phos mono  2 tablet Oral BID    ketoconazole  100 mg Oral Daily    multivitamin  1 tablet Oral Daily    mupirocin  1 g Nasal BID    mycophenolate  1,000 mg Oral BID    predniSONE  20 mg Oral Daily    [START ON 10/22/2023] sulfamethoxazole-trimethoprim 400-80mg  1 tablet Oral Daily AM    tacrolimus  9 mg Oral BID    [START ON 10/22/2023] valGANciclovir  450 mg Oral Daily AM     Continuous Infusions:   glucagon (human recombinant)       PRN Meds:0.9%  NaCl infusion (for blood administration), 0.9%  NaCl infusion (for blood administration), dextrose 10%, diphenhydrAMINE, EPINEPHrine (PF), glucagon (human recombinant), glucagon (human recombinant), glucose, glucose, glucose, hydrALAZINE, hydrocortisone sodium succinate, insulin aspart U-100, labetalol, melatonin, ondansetron, oxyCODONE, sodium chloride 0.9%, sodium chloride 0.9%, traMADoL    Intake/Output - Last 3 Shifts         10/14 0700  10/15 0659 10/15 0700  10/16 0659 10/16 0700  10/17 0659    P.O. 2342 1880     I.V. (mL/kg)       Blood       IV Piggyback       Total Intake(mL/kg) 2342 (27.3) 1880 (22.6)     Urine (mL/kg/hr) 3440 (1.7) 3210 (1.6) 600 (1)    Drains 710 785     Stool 0 0     Total Output 4150 3995 600    Net -1808 -2115 -600           Stool Occurrence 2 x 1 x              Review of Systems   Constitutional:  Negative for appetite change, fatigue and fever.   HENT:  Negative for trouble swallowing.    Respiratory:  Negative  "for cough and shortness of breath.    Cardiovascular:  Negative for chest pain, palpitations and leg swelling.   Gastrointestinal:  Positive for abdominal pain (incisional). Negative for abdominal distention, diarrhea, nausea and vomiting.   Genitourinary:  Negative for decreased urine volume and dysuria.        Pickering   Skin:  Positive for wound.   Allergic/Immunologic: Positive for immunocompromised state.   Neurological:  Negative for dizziness and weakness.   Psychiatric/Behavioral:  Negative for confusion.       Objective:     Vital Signs (Most Recent):  Temp: 97.9 °F (36.6 °C) (10/16/23 1130)  Pulse: 86 (10/16/23 1130)  Resp: 17 (10/16/23 1130)  BP: (!) 151/77 (10/16/23 1130)  SpO2: 99 % (10/16/23 1130) Vital Signs (24h Range):  Temp:  [97.8 °F (36.6 °C)-98.9 °F (37.2 °C)] 97.9 °F (36.6 °C)  Pulse:  [] 86  Resp:  [16-18] 17  SpO2:  [95 %-99 %] 99 %  BP: (148-185)/(74-95) 151/77     Weight: 83.3 kg (183 lb 12.1 oz)  Height: 5' 10" (177.8 cm)  Body mass index is 26.37 kg/m².     Physical Exam  Vitals and nursing note reviewed.   Constitutional:       General: He is not in acute distress.  HENT:      Head: Normocephalic.      Mouth/Throat:      Mouth: Mucous membranes are moist.   Eyes:      Extraocular Movements: Extraocular movements intact.      Conjunctiva/sclera: Conjunctivae normal.      Pupils: Pupils are equal, round, and reactive to light.   Cardiovascular:      Rate and Rhythm: Normal rate and regular rhythm.      Heart sounds: Normal heart sounds. No murmur heard.  Pulmonary:      Effort: Pulmonary effort is normal. No respiratory distress.      Breath sounds: Normal breath sounds.   Abdominal:      General: Bowel sounds are normal. There is no distension.      Palpations: Abdomen is soft.      Tenderness: There is abdominal tenderness (incisional, expected post op). There is no guarding or rebound.      Comments: Incision closed with dermabond, CDI, no SSI or leaking.  KEYA drain in place with " serosanguinous output.     Musculoskeletal:         General: No swelling. Normal range of motion.      Cervical back: Normal range of motion.   Skin:     General: Skin is warm and dry.   Neurological:      Mental Status: He is alert and oriented to person, place, and time.      Motor: No weakness.   Psychiatric:         Mood and Affect: Mood normal.         Behavior: Behavior normal.         Thought Content: Thought content normal.         Judgment: Judgment normal.          Laboratory:  CBC:   Recent Labs   Lab 10/14/23  0613 10/15/23  0703 10/16/23  0510   WBC 6.85 3.89* 3.39*   RBC 2.41* 2.64* 2.27*   HGB 7.4* 8.0* 6.8*   HCT 21.4* 23.7* 20.5*   * 103* 98*   MCV 89 90 90   MCH 30.7 30.3 30.0   MCHC 34.6 33.8 33.2     CMP:   Recent Labs   Lab 10/14/23  0613 10/15/23  0703 10/16/23  0510   * 99 102   CALCIUM 7.4* 7.9* 7.8*   ALBUMIN 2.2* 2.5* 2.3*    139 140   K 4.4 4.2 4.3   CO2 22* 25 23    108 111*   BUN 63* 39* 34*   CREATININE 6.2* 3.1* 2.1*     Labs within the past 24 hours have been reviewed.    Diagnostic Results:  US - Kidney: Results for orders placed during the hospital encounter of 10/12/23    US Transplant Kidney With Doppler    Narrative  EXAMINATION:  US TRANSPLANT KIDNEY WITH DOPPLER    CLINICAL HISTORY:  assess bleed;    TECHNIQUE:  Real time gray scale and doppler ultrasound was performed over the patient's renal allograft.    COMPARISON:  Ultrasound from 10/13/2023    FINDINGS:  Renal allograft in the right lower quadrant.  The allograft measures 12.6 cm. Normal perfusion.  Stent is present.  No hydronephrosis.    Fluid collection superficial to the mid to inferior pole of the allograft measuring 9.7 x 2.5 x 5.4 cm.    Vasculature:    Resistive indices ranged from 0.73 to 0.78.    Main renal artery peak systolic velocity: 282cm/sec with normal waveform.    Renal artery/iliac ratio: 1.4.    The main renal vein is patent.    Impression  Satisfactory gray scale and doppler  "evaluation of renal allograft.    Heterogeneous fluid collection adjacent to the inferior pole, likely hematoma versus complicated seroma.  This does not appear to be a subcapsular hematoma and is superficial to the allograft.  No significant mass effect.      Electronically signed by: Diego James MD  Date:    10/16/2023  Time:    10:49    Assessment/Plan:     * S/P kidney transplant  - LRRT 10/12/23  - surgery went well, started making great UOP, labs clearing   - UOP dropped abruptly, Cr stopped clearing   - TB for ex lab, subcapsular hematoma evacuated with return of UOP; 2 day herrmann, 1 drain  - post TB labs stable, hgb dropped to <7, 1 unit PRBCs transfused   - strict Is and Os   - herrmann removed 10/14.    - KEYA drain output with 785cc/24hr.Drain Cr negative for urine leak  - Continue regular diet        Hypophosphatemia  - replace prn. Monitor      At risk for opportunistic infections  - valcyte for CMV, bactrim for PJP       Prophylactic immunotherapy  - tac, MMF, pred   - monitor tac levels for toxicity and therapeutic efforts       Long-term use of immunosuppressant medication  - see "prophylactic immunotherapy"      Anemia, chronic disease  - h/h decreased. Plan for additional blood on 10/16  - HDS  - monitor with CBC       Acute blood loss anemia  - 2/2 transplant and takeback   - expected post op   - 1 unit 10/13   - Transfused 1u prbc 10/16  - keep T&S up to date   - see "anemia chronic disease"    Benign hypertension  - cont home meds as appropriate with hold parameters in place   - Continue Coreg      Type 2 diabetes mellitus with renal complication  - endocrine consulted, appreciate assistance           Discharge Planning: tentative d/c tomorrow pending lab work stability    Medical decision making for this encounter includes review of pertinent labs and diagnostic studies, assessment and planning, discussions with consulting providers, discussion with patient/family, and participation in " multidisciplinary rounds. Time spent caring for patient: 60 minutes    Cheli Meyer PA-C  Kidney Transplant  [facility]

## 2023-10-16 NOTE — PLAN OF CARE
Plan of care reviewed with the patient at the beginning of the shift. S/p kidney transplant. RLQ incision is closed with dermabond. Edges are well approximated. KEYA drain x1, leaking around the site. Dressing changed once. Pickering has been removed several days ago. Pt is voiding adequately without difficulty. Pt is tolerating PO intake. PO intake encouraged. Blood glucose monitored ac and hs. Fall precautions maintained. Pt remained free from falls and injury this shift. Bed locked in lowest position, side rails up x2, call light within reach. Instructed pt to call for assistance as needed. Pt verbalized understanding. Vitals stable. Pt afebrile overnight. No acute issues overnight. Will continue to monitor.

## 2023-10-16 NOTE — PROGRESS NOTES
[facility]  Endocrinology  Progress Note    Admit Date: 10/12/2023     Reason for Consult: Management of T2DM, Hyperglycemia     Surgical Procedure and Date: s/p kidney transplant on 10/12/2023    Diabetes diagnosis year: 2009    Home Diabetes Medications:  Tresiba (has not taken in over a year)    How often checking glucose at home? 1-3 x day   BG readings on regimen: 80's in the AM and 130's at lunch time.   Hypoglycemia on the regimen?  No  Missed doses on regimen?  No    Diabetes Complications include:     Hyperglycemia, Diabetic nephropathy  , and Diabetic chronic kidney disease         Complicating diabetes co morbidities:   Glucocorticoid use       HPI: Mr. Hernandez is a 62 y.o. year old White male who has been approved to receive a living related kidney transplant.  He has advanced kidney disease secondary to diabetic nephropathy and IgA nephropathy. Patient is on peritoneal dialysis. He received a LRRT 10/12/23 from his niece for IgA nephropathy and DMII. Endocrine consulted to manage hyperglycemia and type 2 diabetes.     Hemoglobin A1C   Date Value Ref Range Status   10/12/2023 5.1 4.0 - 5.6 % Final     Comment:     ADA Screening Guidelines:  5.7-6.4%  Consistent with prediabetes  >or=6.5%  Consistent with diabetes    High levels of fetal hemoglobin interfere with the HbA1C  assay. Heterozygous hemoglobin variants (HbS, HgC, etc)do  not significantly interfere with this assay.   However, presence of multiple variants may affect accuracy.     10/06/2021 6.6 (H) 4.0 - 5.6 % Final     Comment:     ADA Screening Guidelines:  5.7-6.4%  Consistent with prediabetes  >or=6.5%  Consistent with diabetes    High levels of fetal hemoglobin interfere with the HbA1C  assay. Heterozygous hemoglobin variants (HbS, HgC, etc)do  not significantly interfere with this assay.   However, presence of multiple variants may affect accuracy.                      Interval HPI:   No acute events overnight. Patient  "in room 89583/58911 A. Blood glucose stable. BG at and below goal on current insulin regimen (SSI, prandial, and basal insulin ). Steroid use- Prednisone 20 mg QD.   3 Days Post-Op  Renal function- Abnormal - Cr 2.1   Vasopressors-  None     Diet Adult Regular (IDDSI Level 7) Standard Tray     Eatin%  Nausea: No  Hypoglycemia and intervention: No  Fever: No  TPN and/or TF: No    BP (!) 148/74 (BP Location: Right arm, Patient Position: Sitting)   Pulse 99   Temp 97.8 °F (36.6 °C) (Oral)   Resp 17   Ht 5' 10" (1.778 m)   Wt 83.3 kg (183 lb 12.1 oz)   SpO2 97%   BMI 26.37 kg/m²     Labs Reviewed and Include    Recent Labs   Lab 10/16/23  0510      CALCIUM 7.8*   ALBUMIN 2.3*      K 4.3   CO2 23   *   BUN 34*   CREATININE 2.1*     Lab Results   Component Value Date    WBC 3.39 (L) 10/16/2023    HGB 6.8 (L) 10/16/2023    HCT 20.5 (L) 10/16/2023    MCV 90 10/16/2023    PLT 98 (L) 10/16/2023     No results for input(s): "TSH", "FREET4" in the last 168 hours.  Lab Results   Component Value Date    HGBA1C 5.1 10/12/2023       Nutritional status:   Body mass index is 26.37 kg/m².  Lab Results   Component Value Date    ALBUMIN 2.3 (L) 10/16/2023    ALBUMIN 2.5 (L) 10/15/2023    ALBUMIN 2.2 (L) 10/14/2023     No results found for: "PREALBUMIN"    Estimated Creatinine Clearance: 38.1 mL/min (A) (based on SCr of 2.1 mg/dL (H)).    Accu-Checks  Recent Labs     10/13/23  1628 10/13/23  2036 10/14/23  1258 10/14/23  1723 10/14/23  2143 10/15/23  0838 10/15/23  1239 10/15/23  1721 10/15/23  2132 10/16/23  0852   POCTGLUCOSE 193* 232* 206* 196* 195* 113* 125* 188* 141* 121*       Current Medications and/or Treatments Impacting Glycemic Control  Immunotherapy:    Immunosuppressants           Stop Route Frequency     tacrolimus capsule 8 mg         -- Oral 2 times daily     mycophenolate capsule 1,000 mg         -- Oral 2 times daily          Steroids:   Hormones (From admission, onward)      Start     " Stop Route Frequency Ordered    10/15/23 0900  predniSONE tablet 20 mg  (IP TXP KIDNEY POST-OP THYMO WITH PERIPHERAL PRECHECKED)         -- Oral Daily 10/12/23 1346    10/14/23 0220  melatonin tablet 6 mg         -- Oral Nightly PRN 10/14/23 0121    10/12/23 1434  hydrocortisone sodium succinate injection 100 mg  (IP TXP KIDNEY POST-OP THYMO WITH PERIPHERAL PRECHECKED)         03/10/35 0534 IV Once as needed 10/12/23 1346          Pressors:    Autonomic Drugs (From admission, onward)      Start     Stop Route Frequency Ordered    10/12/23 1434  EPINEPHrine (PF) injection 1 mg  (IP TXP KIDNEY POST-OP THYMO WITH PERIPHERAL PRECHECKED)         03/10/35 0534 SubQ Once as needed 10/12/23 1346          Hyperglycemia/Diabetes Medications:   Antihyperglycemics (From admission, onward)      Start     Stop Route Frequency Ordered    10/16/23 0900  insulin detemir U-100 (Levemir) pen 8 Units         -- SubQ Daily 10/15/23 1002    10/15/23 1130  insulin aspart U-100 pen 3 Units         -- SubQ 3 times daily with meals 10/15/23 1002    10/14/23 1050  insulin aspart U-100 pen 0-5 Units         -- SubQ Before meals & nightly PRN 10/14/23 0950            ASSESSMENT and PLAN    Renal/  * S/P kidney transplant  Optimize BG control to improve wound healing  Titrate insulin slowly to avoid hypoglycemia as the risk of hypoglycemia increases with decreased creatinine clearance.    Estimated Creatinine Clearance: 38.1 mL/min (A) (based on SCr of 2.1 mg/dL (H)).        ID  At risk for opportunistic infections  Optimize BG control       Endocrine  Type 2 diabetes mellitus with renal complication  Endocrinology consulted for BG management.   BG goal 140-180    - Levemir 8 units   - Novolog 3 units with meals  - Novolog (Insulin Aspart) prn for BG excursions LDC SSI (150/50)  - BG checks AC/HS  - Hypoglycemia protocol in place    ** Please notify Endocrine for any change and/or advance in diet**  ** Please call Endocrine for any BG related  issues **    Discharge Planning:   TBD. Please notify endocrinology prior to discharge.            Dulce Maria Terrazas PA-C  Endocrinology  [facility]

## 2023-10-16 NOTE — ASSESSMENT & PLAN NOTE
- LRRT 10/12/23  - surgery went well, started making great UOP, labs clearing   - UOP dropped abruptly, Cr stopped clearing   - TB for ex lab, subcapsular hematoma evacuated with return of UOP; 2 day herrmann, 1 drain  - post TB labs stable, hgb dropped to <7, 1 unit PRBCs transfused   - strict Is and Os   - herrmann removed 10/14.    - KYEA drain output with 785cc/24hr.Drain Cr negative for urine leak  - Continue regular diet

## 2023-10-16 NOTE — PLAN OF CARE
Recommendations    1. Continue Regular diet      2. If PO intake <50%, add Boost Plus BID      3. RD to monitor and follow    Goals: Meet % EEN, EPN by RD f/u  Nutrition Goal Status: new  Communication of RD Recs:  (POC)

## 2023-10-17 ENCOUNTER — TELEPHONE (OUTPATIENT)
Dept: ENDOCRINOLOGY | Facility: HOSPITAL | Age: 63
End: 2023-10-17
Payer: COMMERCIAL

## 2023-10-17 ENCOUNTER — PATIENT MESSAGE (OUTPATIENT)
Dept: ENDOCRINOLOGY | Facility: HOSPITAL | Age: 63
End: 2023-10-17
Payer: COMMERCIAL

## 2023-10-17 VITALS
SYSTOLIC BLOOD PRESSURE: 152 MMHG | RESPIRATION RATE: 18 BRPM | BODY MASS INDEX: 26.31 KG/M2 | HEART RATE: 82 BPM | WEIGHT: 183.75 LBS | OXYGEN SATURATION: 98 % | DIASTOLIC BLOOD PRESSURE: 76 MMHG | HEIGHT: 70 IN | TEMPERATURE: 98 F

## 2023-10-17 DIAGNOSIS — Z94.0 KIDNEY REPLACED BY TRANSPLANT: Primary | ICD-10-CM

## 2023-10-17 LAB
ALBUMIN SERPL BCP-MCNC: 2.4 G/DL (ref 3.5–5.2)
ANION GAP SERPL CALC-SCNC: 7 MMOL/L (ref 8–16)
BASOPHILS # BLD AUTO: 0.01 K/UL (ref 0–0.2)
BASOPHILS NFR BLD: 0.2 % (ref 0–1.9)
BUN SERPL-MCNC: 30 MG/DL (ref 8–23)
CALCIUM SERPL-MCNC: 7.8 MG/DL (ref 8.7–10.5)
CHLORIDE SERPL-SCNC: 111 MMOL/L (ref 95–110)
CO2 SERPL-SCNC: 21 MMOL/L (ref 23–29)
CREAT SERPL-MCNC: 1.8 MG/DL (ref 0.5–1.4)
DIFFERENTIAL METHOD: ABNORMAL
EOSINOPHIL # BLD AUTO: 0.1 K/UL (ref 0–0.5)
EOSINOPHIL NFR BLD: 1.4 % (ref 0–8)
ERYTHROCYTE [DISTWIDTH] IN BLOOD BY AUTOMATED COUNT: 14.1 % (ref 11.5–14.5)
EST. GFR  (NO RACE VARIABLE): 42.3 ML/MIN/1.73 M^2
GLUCOSE SERPL-MCNC: 93 MG/DL (ref 70–110)
HCT VFR BLD AUTO: 22.4 % (ref 40–54)
HGB BLD-MCNC: 7.3 G/DL (ref 14–18)
IMM GRANULOCYTES # BLD AUTO: 0.04 K/UL (ref 0–0.04)
IMM GRANULOCYTES NFR BLD AUTO: 0.9 % (ref 0–0.5)
LYMPHOCYTES # BLD AUTO: 0.1 K/UL (ref 1–4.8)
LYMPHOCYTES NFR BLD: 2.5 % (ref 18–48)
MAGNESIUM SERPL-MCNC: 1.8 MG/DL (ref 1.6–2.6)
MCH RBC QN AUTO: 29.2 PG (ref 27–31)
MCHC RBC AUTO-ENTMCNC: 32.6 G/DL (ref 32–36)
MCV RBC AUTO: 90 FL (ref 82–98)
MONOCYTES # BLD AUTO: 0.3 K/UL (ref 0.3–1)
MONOCYTES NFR BLD: 6.8 % (ref 4–15)
NEUTROPHILS # BLD AUTO: 3.9 K/UL (ref 1.8–7.7)
NEUTROPHILS NFR BLD: 88.2 % (ref 38–73)
NRBC BLD-RTO: 0 /100 WBC
PHOSPHATE SERPL-MCNC: 1.8 MG/DL (ref 2.7–4.5)
PLATELET # BLD AUTO: 112 K/UL (ref 150–450)
PMV BLD AUTO: 10.9 FL (ref 9.2–12.9)
POCT GLUCOSE: 117 MG/DL (ref 70–110)
POCT GLUCOSE: 165 MG/DL (ref 70–110)
POTASSIUM SERPL-SCNC: 3.9 MMOL/L (ref 3.5–5.1)
RBC # BLD AUTO: 2.5 M/UL (ref 4.6–6.2)
SODIUM SERPL-SCNC: 139 MMOL/L (ref 136–145)
TACROLIMUS BLD-MCNC: 5 NG/ML (ref 5–15)
WBC # BLD AUTO: 4.43 K/UL (ref 3.9–12.7)

## 2023-10-17 PROCEDURE — 99232 PR SUBSEQUENT HOSPITAL CARE,LEVL II: ICD-10-PCS | Mod: ,,,

## 2023-10-17 PROCEDURE — 63600175 PHARM REV CODE 636 W HCPCS

## 2023-10-17 PROCEDURE — 25000003 PHARM REV CODE 250: Performed by: INTERNAL MEDICINE

## 2023-10-17 PROCEDURE — 83735 ASSAY OF MAGNESIUM: CPT

## 2023-10-17 PROCEDURE — 25000003 PHARM REV CODE 250: Performed by: PHYSICIAN ASSISTANT

## 2023-10-17 PROCEDURE — 80069 RENAL FUNCTION PANEL: CPT

## 2023-10-17 PROCEDURE — 36415 COLL VENOUS BLD VENIPUNCTURE: CPT

## 2023-10-17 PROCEDURE — 99239 HOSP IP/OBS DSCHRG MGMT >30: CPT | Mod: 24,,, | Performed by: PHYSICIAN ASSISTANT

## 2023-10-17 PROCEDURE — 99232 SBSQ HOSP IP/OBS MODERATE 35: CPT | Mod: ,,,

## 2023-10-17 PROCEDURE — 80197 ASSAY OF TACROLIMUS: CPT

## 2023-10-17 PROCEDURE — 25000003 PHARM REV CODE 250

## 2023-10-17 PROCEDURE — 85025 COMPLETE CBC W/AUTO DIFF WBC: CPT

## 2023-10-17 PROCEDURE — 99239 PR HOSPITAL DISCHARGE DAY,>30 MIN: ICD-10-PCS | Mod: 24,,, | Performed by: PHYSICIAN ASSISTANT

## 2023-10-17 PROCEDURE — 25000003 PHARM REV CODE 250: Performed by: NURSE PRACTITIONER

## 2023-10-17 PROCEDURE — 63600175 PHARM REV CODE 636 W HCPCS: Performed by: PHYSICIAN ASSISTANT

## 2023-10-17 RX ORDER — NIFEDIPINE 30 MG/1
30 TABLET, EXTENDED RELEASE ORAL DAILY
Qty: 30 TABLET | Refills: 11 | Status: SHIPPED | OUTPATIENT
Start: 2023-10-17 | End: 2024-01-04 | Stop reason: DRUGHIGH

## 2023-10-17 RX ORDER — DOCUSATE SODIUM 100 MG/1
100 CAPSULE, LIQUID FILLED ORAL 2 TIMES DAILY PRN
Qty: 30 CAPSULE | Refills: 0 | COMMUNITY
Start: 2023-10-17 | End: 2023-12-18

## 2023-10-17 RX ORDER — INSULIN GLARGINE-YFGN 100 [IU]/ML
8 INJECTION, SOLUTION SUBCUTANEOUS DAILY
Qty: 3 ML | Refills: 11 | Status: SHIPPED | OUTPATIENT
Start: 2023-10-18

## 2023-10-17 RX ORDER — SODIUM BICARBONATE 650 MG/1
650 TABLET ORAL 2 TIMES DAILY
Status: DISCONTINUED | OUTPATIENT
Start: 2023-10-17 | End: 2023-10-17 | Stop reason: HOSPADM

## 2023-10-17 RX ORDER — CARVEDILOL 12.5 MG/1
12.5 TABLET ORAL 2 TIMES DAILY
Qty: 60 TABLET | Refills: 11 | Status: SHIPPED | OUTPATIENT
Start: 2023-10-17

## 2023-10-17 RX ORDER — VALGANCICLOVIR 450 MG/1
450 TABLET, FILM COATED ORAL DAILY
Qty: 30 TABLET | Refills: 2 | Status: SHIPPED | OUTPATIENT
Start: 2023-10-17 | End: 2023-11-08 | Stop reason: SDUPTHER

## 2023-10-17 RX ORDER — OXYCODONE HYDROCHLORIDE 5 MG/1
5 TABLET ORAL EVERY 8 HOURS PRN
Qty: 15 TABLET | Refills: 0 | Status: SHIPPED | OUTPATIENT
Start: 2023-10-17 | End: 2023-11-08

## 2023-10-17 RX ORDER — BISACODYL 5 MG
10 TABLET, DELAYED RELEASE (ENTERIC COATED) ORAL NIGHTLY
Qty: 30 TABLET | Refills: 0 | COMMUNITY
Start: 2023-10-17 | End: 2023-10-17 | Stop reason: SDUPTHER

## 2023-10-17 RX ORDER — FAMOTIDINE 20 MG/1
20 TABLET, FILM COATED ORAL NIGHTLY
Qty: 30 TABLET | Refills: 0 | Status: SHIPPED | OUTPATIENT
Start: 2023-10-17

## 2023-10-17 RX ORDER — ATORVASTATIN CALCIUM 10 MG/1
10 TABLET, FILM COATED ORAL DAILY
Qty: 30 TABLET | Refills: 11 | Status: SHIPPED | OUTPATIENT
Start: 2023-10-17

## 2023-10-17 RX ORDER — PEN NEEDLE, DIABETIC 30 GX3/16"
1 NEEDLE, DISPOSABLE MISCELLANEOUS 4 TIMES DAILY
Qty: 100 EACH | Refills: 11 | Status: SHIPPED | OUTPATIENT
Start: 2023-10-17

## 2023-10-17 RX ORDER — TACROLIMUS 1 MG/1
1 CAPSULE ORAL ONCE
Status: COMPLETED | OUTPATIENT
Start: 2023-10-17 | End: 2023-10-17

## 2023-10-17 RX ORDER — TACROLIMUS 5 MG/1
10 CAPSULE ORAL 2 TIMES DAILY
Status: DISCONTINUED | OUTPATIENT
Start: 2023-10-17 | End: 2023-10-17 | Stop reason: HOSPADM

## 2023-10-17 RX ORDER — TACROLIMUS 1 MG/1
10 CAPSULE ORAL EVERY 12 HOURS
Qty: 600 CAPSULE | Refills: 11 | Status: SHIPPED | OUTPATIENT
Start: 2023-10-17 | End: 2023-10-20 | Stop reason: DRUGHIGH

## 2023-10-17 RX ORDER — INSULIN LISPRO 100 [IU]/ML
3 INJECTION, SOLUTION INTRAVENOUS; SUBCUTANEOUS
Qty: 6 ML | Refills: 11 | Status: SHIPPED | OUTPATIENT
Start: 2023-10-17 | End: 2023-11-07 | Stop reason: SDUPTHER

## 2023-10-17 RX ORDER — BISACODYL 5 MG
5 TABLET, DELAYED RELEASE (ENTERIC COATED) ORAL DAILY PRN
Qty: 30 TABLET | Refills: 0 | COMMUNITY
Start: 2023-10-17 | End: 2023-12-18

## 2023-10-17 RX ORDER — NIFEDIPINE 30 MG/1
30 TABLET, EXTENDED RELEASE ORAL DAILY
Status: DISCONTINUED | OUTPATIENT
Start: 2023-10-17 | End: 2023-10-17 | Stop reason: HOSPADM

## 2023-10-17 RX ORDER — SODIUM BICARBONATE 650 MG/1
650 TABLET ORAL 2 TIMES DAILY
Qty: 60 TABLET | Refills: 2 | Status: SHIPPED | OUTPATIENT
Start: 2023-10-17 | End: 2023-11-08

## 2023-10-17 RX ORDER — CALCITRIOL 0.5 UG/1
0.5 CAPSULE ORAL DAILY
Qty: 30 CAPSULE | Refills: 11 | Status: SHIPPED | OUTPATIENT
Start: 2023-10-18

## 2023-10-17 RX ORDER — KETOCONAZOLE 200 MG/1
100 TABLET ORAL DAILY
Qty: 30 TABLET | Refills: 11 | Status: SHIPPED | OUTPATIENT
Start: 2023-10-18

## 2023-10-17 RX ADMIN — CALCITRIOL 0.5 MCG: 0.5 CAPSULE, LIQUID FILLED ORAL at 08:10

## 2023-10-17 RX ADMIN — PREDNISONE 20 MG: 20 TABLET ORAL at 08:10

## 2023-10-17 RX ADMIN — KETOCONAZOLE 100 MG: 200 TABLET ORAL at 08:10

## 2023-10-17 RX ADMIN — TACROLIMUS 1 MG: 1 CAPSULE ORAL at 11:10

## 2023-10-17 RX ADMIN — CARVEDILOL 12.5 MG: 12.5 TABLET, FILM COATED ORAL at 08:10

## 2023-10-17 RX ADMIN — MYCOPHENOLATE MOFETIL 1000 MG: 250 CAPSULE ORAL at 08:10

## 2023-10-17 RX ADMIN — THERA TABS 1 TABLET: TAB at 08:10

## 2023-10-17 RX ADMIN — MUPIROCIN 1 G: 20 OINTMENT TOPICAL at 09:10

## 2023-10-17 RX ADMIN — DOCUSATE SODIUM 100 MG: 100 CAPSULE, LIQUID FILLED ORAL at 08:10

## 2023-10-17 RX ADMIN — HEPARIN SODIUM 5000 UNITS: 5000 INJECTION INTRAVENOUS; SUBCUTANEOUS at 05:10

## 2023-10-17 RX ADMIN — SODIUM PHOSPHATE, MONOBASIC, MONOHYDRATE AND SODIUM PHOSPHATE, DIBASIC, ANHYDROUS 20.01 MMOL: 142; 276 INJECTION, SOLUTION INTRAVENOUS at 11:10

## 2023-10-17 RX ADMIN — ATORVASTATIN CALCIUM 10 MG: 10 TABLET, FILM COATED ORAL at 08:10

## 2023-10-17 RX ADMIN — TACROLIMUS 9 MG: 5 CAPSULE ORAL at 08:10

## 2023-10-17 RX ADMIN — INSULIN ASPART 3 UNITS: 100 INJECTION, SOLUTION INTRAVENOUS; SUBCUTANEOUS at 01:10

## 2023-10-17 RX ADMIN — DIBASIC SODIUM PHOSPHATE, MONOBASIC POTASSIUM PHOSPHATE AND MONOBASIC SODIUM PHOSPHATE 2 TABLET: 852; 155; 130 TABLET ORAL at 08:10

## 2023-10-17 RX ADMIN — INSULIN ASPART 3 UNITS: 100 INJECTION, SOLUTION INTRAVENOUS; SUBCUTANEOUS at 08:10

## 2023-10-17 RX ADMIN — NIFEDIPINE 30 MG: 30 TABLET, FILM COATED, EXTENDED RELEASE ORAL at 11:10

## 2023-10-17 RX ADMIN — SODIUM BICARBONATE 650 MG TABLET 650 MG: at 11:10

## 2023-10-17 NOTE — NURSING
Discharge instructions reviewed with the patient and his wife. Both verbalized their understanding and deny any questions at this jeovanny. Patient preparing for discharge, awaiting patient escort.

## 2023-10-17 NOTE — PROGRESS NOTES
Transplant Teaching Book given to patient, Heath Hernandez, during pre-op appts  During the course of the hospital stay the patient received information regarding kidney transplant. Teaching and instruction were completed.  Areas that were discussed included: how to contact the Transplant Team, the importance of measuring intake of fluids and urine output, and monitoring vital signs such as blood pressure, temperature, and daily weights.  Parameters for which to report abnormal findings were given.  Appointment were provided along with the rational for the importance of lab work and clinic visits.  A written medication list was provided.  The importance of immunosuppressive medications, their common side effects, and treatment to prevent or minimize side effects has been reviewed.  Signs and symptoms of rejection and infection along with various treatments were reviewed.  The need to avoid infection was discussed.  Wound care and special consideration regarding activities of daily living were explained.  Written and verbal teaching of the above information was given.     Discussed with the patient and caregiver the importance of maintaining COVID-19 precautions; wearing a mask, good handwashing, and social distancing.  Also, to report any signs or symptoms (fever, difficulty breathing, loss of taste/smell, etc.), suspected exposure, or COVID testing, immediately to the transplant program.     Education provided to the patient and his wife, Krystle

## 2023-10-17 NOTE — ASSESSMENT & PLAN NOTE
Endocrinology consulted for BG management.   BG goal 140-180    - Levemir 8 units daily  - Novolog 3 units with meals  - Novolog (Insulin Aspart) prn for BG excursions LDC SSI (150/50)  - BG checks AC/HS  - Hypoglycemia protocol in place    ** Please notify Endocrine for any change and/or advance in diet**  ** Please call Endocrine for any BG related issues **    Discharge Planning:   - Can d/c on current inpatient regimen of N3 TIDWM and L8 with LDC SSI as needed.  - Provided BG log and advised to fill out and send via patient portal within 5-7 days for adjustments as needed.

## 2023-10-17 NOTE — PROGRESS NOTES
[facility]  Endocrinology  Progress Note    Admit Date: 10/12/2023     Reason for Consult: Management of T2DM, Hyperglycemia     Surgical Procedure and Date: s/p kidney transplant on 10/12/2023    Diabetes diagnosis year: 2009    Home Diabetes Medications:  Tresiba (has not taken in over a year)    How often checking glucose at home? 1-3 x day   BG readings on regimen: 80's in the AM and 130's at lunch time.   Hypoglycemia on the regimen?  No  Missed doses on regimen?  No    Diabetes Complications include:     Hyperglycemia, Diabetic nephropathy  , and Diabetic chronic kidney disease         Complicating diabetes co morbidities:   Glucocorticoid use       HPI: Mr. Hernandez is a 62 y.o. year old White male who has been approved to receive a living related kidney transplant.  He has advanced kidney disease secondary to diabetic nephropathy and IgA nephropathy. Patient is on peritoneal dialysis. He received a LRRT 10/12/23 from his niece for IgA nephropathy and DMII. Endocrine consulted to manage hyperglycemia and type 2 diabetes.     Hemoglobin A1C   Date Value Ref Range Status   10/12/2023 5.1 4.0 - 5.6 % Final     Comment:     ADA Screening Guidelines:  5.7-6.4%  Consistent with prediabetes  >or=6.5%  Consistent with diabetes    High levels of fetal hemoglobin interfere with the HbA1C  assay. Heterozygous hemoglobin variants (HbS, HgC, etc)do  not significantly interfere with this assay.   However, presence of multiple variants may affect accuracy.     10/06/2021 6.6 (H) 4.0 - 5.6 % Final     Comment:     ADA Screening Guidelines:  5.7-6.4%  Consistent with prediabetes  >or=6.5%  Consistent with diabetes    High levels of fetal hemoglobin interfere with the HbA1C  assay. Heterozygous hemoglobin variants (HbS, HgC, etc)do  not significantly interfere with this assay.   However, presence of multiple variants may affect accuracy.                      Interval HPI:   No acute events overnight. Patient  "in room 29225/55596 A. Blood glucose stable. BG at goal on current insulin regimen (SSI, prandial, and basal insulin ). Steroid use- Prednisone 20 mg QD.   4 Days Post-Op  Renal function- Abnormal - cr 1.8   Vasopressors-  None     Diet Adult Regular (IDDSI Level 7) Standard Tray     Eatin%  Nausea: No  Hypoglycemia and intervention: No  Fever: No  TPN and/or TF: No    BP (!) 150/82 (BP Location: Right arm, Patient Position: Sitting)   Pulse 84   Temp 98.3 °F (36.8 °C) (Oral)   Resp 18   Ht 5' 10" (1.778 m)   Wt 83.3 kg (183 lb 12.1 oz)   SpO2 97%   BMI 26.37 kg/m²     Labs Reviewed and Include    Recent Labs   Lab 10/17/23  0521   GLU 93   CALCIUM 7.8*   ALBUMIN 2.4*      K 3.9   CO2 21*   *   BUN 30*   CREATININE 1.8*     Lab Results   Component Value Date    WBC 4.43 10/17/2023    HGB 7.3 (L) 10/17/2023    HCT 22.4 (L) 10/17/2023    MCV 90 10/17/2023     (L) 10/17/2023     No results for input(s): "TSH", "FREET4" in the last 168 hours.  Lab Results   Component Value Date    HGBA1C 5.1 10/12/2023       Nutritional status:   Body mass index is 26.37 kg/m².  Lab Results   Component Value Date    ALBUMIN 2.4 (L) 10/17/2023    ALBUMIN 2.3 (L) 10/16/2023    ALBUMIN 2.5 (L) 10/15/2023     No results found for: "PREALBUMIN"    Estimated Creatinine Clearance: 44.5 mL/min (A) (based on SCr of 1.8 mg/dL (H)).    Accu-Checks  Recent Labs     10/14/23  2143 10/15/23  0838 10/15/23  1239 10/15/23  1721 10/15/23  2132 10/16/23  0852 10/16/23  1305 10/16/23  1708 10/16/23  2243 10/17/23  0832   POCTGLUCOSE 195* 113* 125* 188* 141* 121* 158* 223* 144* 117*       Current Medications and/or Treatments Impacting Glycemic Control  Immunotherapy:    Immunosuppressants           Stop Route Frequency     tacrolimus capsule 10 mg         -- Oral 2 times daily     tacrolimus capsule 1 mg         -- Oral Once     mycophenolate capsule 1,000 mg         -- Oral 2 times daily          Steroids:   Hormones " (From admission, onward)      Start     Stop Route Frequency Ordered    10/15/23 0900  predniSONE tablet 20 mg  (IP TXP KIDNEY POST-OP THYMO WITH PERIPHERAL PRECHECKED)         -- Oral Daily 10/12/23 1346    10/14/23 0220  melatonin tablet 6 mg         -- Oral Nightly PRN 10/14/23 0121    10/12/23 1434  hydrocortisone sodium succinate injection 100 mg  (IP TXP KIDNEY POST-OP THYMO WITH PERIPHERAL PRECHECKED)         03/10/35 0534 IV Once as needed 10/12/23 1346          Pressors:    Autonomic Drugs (From admission, onward)      Start     Stop Route Frequency Ordered    10/12/23 1434  EPINEPHrine (PF) injection 1 mg  (IP TXP KIDNEY POST-OP THYMO WITH PERIPHERAL PRECHECKED)         03/10/35 0534 SubQ Once as needed 10/12/23 1346          Hyperglycemia/Diabetes Medications:   Antihyperglycemics (From admission, onward)      Start     Stop Route Frequency Ordered    10/16/23 0900  insulin detemir U-100 (Levemir) pen 8 Units         -- SubQ Daily 10/15/23 1002    10/15/23 1130  insulin aspart U-100 pen 3 Units         -- SubQ 3 times daily with meals 10/15/23 1002    10/14/23 1050  insulin aspart U-100 pen 0-5 Units         -- SubQ Before meals & nightly PRN 10/14/23 0950            ASSESSMENT and PLAN    Renal/  * S/P kidney transplant  Optimize BG control to improve wound healing  Titrate insulin slowly to avoid hypoglycemia as the risk of hypoglycemia increases with decreased creatinine clearance.    Estimated Creatinine Clearance: 44.5 mL/min (A) (based on SCr of 1.8 mg/dL (H)).        ID  At risk for opportunistic infections  Optimize BG control       Endocrine  Type 2 diabetes mellitus with renal complication  Endocrinology consulted for BG management.   BG goal 140-180    - Levemir 8 units daily  - Novolog 3 units with meals  - Novolog (Insulin Aspart) prn for BG excursions LDC SSI (150/50)  - BG checks AC/HS  - Hypoglycemia protocol in place    ** Please notify Endocrine for any change and/or advance in  diet**  ** Please call Endocrine for any BG related issues **    Discharge Planning:   - Can d/c on current inpatient regimen of N3 TIDWM and L8 with LDC SSI as needed.  - Provided BG log and advised to fill out and send via patient portal within 5-7 days for adjustments as needed.         Dulce Maria Terrazas PA-C  Endocrinology  [facility]

## 2023-10-17 NOTE — PROGRESS NOTES
EDUCATION NOTE:    Met with Heath Hernandez and his caregivers to provide teaching re: immunosuppressant medications. Reviewed medication section of the Kidney Transplant Education book that was provided. Emphasized the importance of compliance, role of the blue medication card, concerns for drug interactions, and process of obtaining refills.  Counseled regarding Prograf, Cellcept , prednisone, including directions for use, monitoring, how to handle missed doses, and side effects.  Patient and caregiver verbalized understanding and had the opportunity to ask questions.

## 2023-10-17 NOTE — ASSESSMENT & PLAN NOTE
Optimize BG control to improve wound healing  Titrate insulin slowly to avoid hypoglycemia as the risk of hypoglycemia increases with decreased creatinine clearance.    Estimated Creatinine Clearance: 44.5 mL/min (A) (based on SCr of 1.8 mg/dL (H)).

## 2023-10-17 NOTE — SUBJECTIVE & OBJECTIVE
"Interval HPI:   No acute events overnight. Patient in room 79021/95599 A. Blood glucose stable. BG at goal on current insulin regimen (SSI, prandial, and basal insulin ). Steroid use- Prednisone 20 mg QD.   4 Days Post-Op  Renal function- Abnormal - cr 1.8   Vasopressors-  None     Diet Adult Regular (IDDSI Level 7) Standard Tray     Eatin%  Nausea: No  Hypoglycemia and intervention: No  Fever: No  TPN and/or TF: No    BP (!) 150/82 (BP Location: Right arm, Patient Position: Sitting)   Pulse 84   Temp 98.3 °F (36.8 °C) (Oral)   Resp 18   Ht 5' 10" (1.778 m)   Wt 83.3 kg (183 lb 12.1 oz)   SpO2 97%   BMI 26.37 kg/m²     Labs Reviewed and Include    Recent Labs   Lab 10/17/23  0521   GLU 93   CALCIUM 7.8*   ALBUMIN 2.4*      K 3.9   CO2 21*   *   BUN 30*   CREATININE 1.8*     Lab Results   Component Value Date    WBC 4.43 10/17/2023    HGB 7.3 (L) 10/17/2023    HCT 22.4 (L) 10/17/2023    MCV 90 10/17/2023     (L) 10/17/2023     No results for input(s): "TSH", "FREET4" in the last 168 hours.  Lab Results   Component Value Date    HGBA1C 5.1 10/12/2023       Nutritional status:   Body mass index is 26.37 kg/m².  Lab Results   Component Value Date    ALBUMIN 2.4 (L) 10/17/2023    ALBUMIN 2.3 (L) 10/16/2023    ALBUMIN 2.5 (L) 10/15/2023     No results found for: "PREALBUMIN"    Estimated Creatinine Clearance: 44.5 mL/min (A) (based on SCr of 1.8 mg/dL (H)).    Accu-Checks  Recent Labs     10/14/23  2143 10/15/23  0838 10/15/23  1239 10/15/23  1721 10/15/23  2132 10/16/23  0852 10/16/23  1305 10/16/23  1708 10/16/23  2243 10/17/23  0832   POCTGLUCOSE 195* 113* 125* 188* 141* 121* 158* 223* 144* 117*       Current Medications and/or Treatments Impacting Glycemic Control  Immunotherapy:    Immunosuppressants           Stop Route Frequency     tacrolimus capsule 10 mg         -- Oral 2 times daily     tacrolimus capsule 1 mg         -- Oral Once     mycophenolate capsule 1,000 mg         -- " Oral 2 times daily          Steroids:   Hormones (From admission, onward)      Start     Stop Route Frequency Ordered    10/15/23 0900  predniSONE tablet 20 mg  (IP TXP KIDNEY POST-OP THYMO WITH PERIPHERAL PRECHECKED)         -- Oral Daily 10/12/23 1346    10/14/23 0220  melatonin tablet 6 mg         -- Oral Nightly PRN 10/14/23 0121    10/12/23 1434  hydrocortisone sodium succinate injection 100 mg  (IP TXP KIDNEY POST-OP THYMO WITH PERIPHERAL PRECHECKED)         03/10/35 0534 IV Once as needed 10/12/23 1346          Pressors:    Autonomic Drugs (From admission, onward)      Start     Stop Route Frequency Ordered    10/12/23 1434  EPINEPHrine (PF) injection 1 mg  (IP TXP KIDNEY POST-OP THYMO WITH PERIPHERAL PRECHECKED)         03/10/35 0534 SubQ Once as needed 10/12/23 1346          Hyperglycemia/Diabetes Medications:   Antihyperglycemics (From admission, onward)      Start     Stop Route Frequency Ordered    10/16/23 0900  insulin detemir U-100 (Levemir) pen 8 Units         -- SubQ Daily 10/15/23 1002    10/15/23 1130  insulin aspart U-100 pen 3 Units         -- SubQ 3 times daily with meals 10/15/23 1002    10/14/23 1050  insulin aspart U-100 pen 0-5 Units         -- SubQ Before meals & nightly PRN 10/14/23 0950

## 2023-10-17 NOTE — PROGRESS NOTES
Patient admitted for Kidney transplant.Transplant coordinator met with the patient on rounds to introduce self and explain the coordinator role. The post-transplant teaching book was given. Transplant Coordinator explained that she will follow the patient while in the hospital and assist with discharge.       ESRD 2/2 DM/IgA  SCD, LURD  Thymo induction   CMV ++

## 2023-10-17 NOTE — PROGRESS NOTES
Transplant Coordinator  10/12-10/17/2023     Pt admitted for a LURD kidney transplant from his niece. ESRD 2/2 DM/IgA. Thymo induction, CMV ++  Pt was on PD--cath removed during surgery.     Pt taken back to OR 10/13 and found to have extensive subcapsular hematoma causing compression.  After decompression, UOP and kidney function improving.   Making good urine and Cr trending down nicely.      H/H dropped 10/16 requiring 1u PRBC. US with 9.7 x 2.5 x 5.4 cm collection. Reviewed by staff surgeon. No plans for intervention at this time.      KEYA drain remains in place due to high output (>650cc). Cr negative for urine leak.  Surgery clinic 10/20 to assess drain     Labs 10/18 and RTC to meet with coordinator/pharmD

## 2023-10-17 NOTE — PROGRESS NOTES
"DISCHARGE NOTE:    Heath Hernandez is a 63 y.o. male s/p LEFT KIDNEY Living transplant on 10/12/2023 (Kidney) for ESKD secondary to Diabetes Mellitus - Type II/IgA nephropathy.     Past Medical History:   Diagnosis Date    Anxiety     Cancer     Skin Cancer    Diabetes mellitus     Diabetes mellitus, type 2     Disorder of kidney and ureter     Hyperlipidemia     Hypertension      Hospital Course: Received thymo for induction 10/12-10/14 (4.5 mg/kg total). Post-op course complicated by acute drop urine output. 10/13 taken back to OR - found to have extensive subcapsular hematoma. After decompression in OR, UOP and kidney function improved. 10/16 H/H dropped, requiring 1u PRBC. Patient encouraged to eat foods high in phosphorous to limit need for phosphorous supplements. Deemed not candidate for steroid withdrawal d/t HLA mismatch. Ketoconazole started 10/16 as patient requiring significant doses of tacrolimus even with continual increases.     To note, patient does NOT want future opioid prescriptions for pain management d/t family history.     Allergies: NKDA    Patient Pharmacy: [facility]  - Medications delivered to bedside    Discharge Medications:     Medication List        START taking these medications      BD ULTRA-FINE DEREK PEN NEEDLE 32 gauge x 5/32" Ndle  Generic drug: pen needle, diabetic  Use 1 pen needle to inject insulin 4 (four) times daily.     bisacodyL 5 mg EC tablet  Commonly known as: DULCOLAX  Take 1 tablet (5 mg total) by mouth daily as needed for Constipation.     calcitRIOL 0.5 MCG Cap  Commonly known as: ROCALTROL  Take 1 capsule (0.5 mcg total) by mouth once daily.  Start taking on: October 18, 2023     docusate sodium 100 MG capsule  Commonly known as: COLACE  Take 1 capsule (100 mg total) by mouth 2 (two) times daily as needed for Constipation.     famotidine 20 MG tablet  Commonly known as: PEPCID  Take 1 tablet (20 mg total) by mouth every evening.     HumaLOG KwikPen " Insulin 100 unit/mL pen  Generic drug: insulin lispro  Inject 3 Units into the skin 3 (three) times daily with meals. + sliding scale (Max Total daily dose = 24 units). DISCARD PEN AFTER 28 DAYS AND REFILL.     ketoconazole 200 mg Tab  Commonly known as: NIZORAL  Take ½ tablet (100 mg total) by mouth once daily.  Start taking on: October 18, 2023     multivitamin Tab  Take 1 tablet by mouth once daily.  Start taking on: October 18, 2023     mycophenolate 250 mg Cap  Commonly known as: CELLCEPT  Take 4 capsules (1,000 mg total) by mouth 2 (two) times daily.     NIFEdipine 30 MG (OSM) 24 hr tablet  Commonly known as: PROCARDIA-XL  Take 1 tablet (30 mg total) by mouth once daily. HOLD SBP <140     oxyCODONE 5 MG immediate release tablet  Commonly known as: ROXICODONE  Take 1 tablet (5 mg total) by mouth every 8 (eight) hours as needed for Pain.     PHOSPHA 250 NEUTRAL 250 mg Tab  Generic drug: k phos di & mono-sod phos mono  Take 2 tablets by mouth 2 (two) times a day.     predniSONE 5 MG tablet  Commonly known as: DELTASONE  Take by mouth daily: 10/15 to 10/21 20 mg, 10/22 to 10/28 15 mg, 10/29 to 11/4 10 mg, 11/5/23 take 5mg daily indefinitely. DO NOT STOP     SEMGLEE(INSULIN GLARG-YFGN) unit/mL (3 mL) Inpn  Generic drug: insulin glargine-yfgn  Inject 8 Units into the skin once daily.  Start taking on: October 18, 2023     sulfamethoxazole-trimethoprim 400-80mg 400-80 mg per tablet  Commonly known as: BACTRIM,SEPTRA  Take 1 tablet by mouth once daily. STOP: 4/13/24     tacrolimus 1 MG Cap  Commonly known as: PROGRAF  Take 10 capsules (10 mg total) by mouth every 12 (twelve) hours.     valGANciclovir 450 mg Tab  Commonly known as: VALCYTE  Take 1 tablet (450 mg total) by mouth once daily. STOP: 1/13/24            CHANGE how you take these medications      carvediloL 12.5 MG tablet  Commonly known as: COREG  Take 1 tablet (12.5 mg total) by mouth 2 (two) times daily. HOLD SBP <130, HR <60  What changed:  "  medication strength  how much to take  additional instructions     sodium bicarbonate 650 MG tablet  Take 1 tablet (650 mg total) by mouth 2 (two) times daily.  What changed: when to take this            CONTINUE taking these medications      atorvastatin 10 MG tablet  Commonly known as: LIPITOR  Take 1 tablet (10 mg total) by mouth once daily.     clonazePAM 1 MG tablet  Commonly known as: KlonoPIN            STOP taking these medications      amLODIPine 5 MG tablet  Commonly known as: NORVASC     AURYXIA 210 mg iron Tab  Generic drug: ferric citrate     furosemide 40 MG tablet  Commonly known as: LASIX     gentamicin 0.1 % cream  Commonly known as: GARAMYCIN     losartan 100 MG tablet  Commonly known as: COZAAR     CRISTIAN-ESEQUIEL 0.8 mg Tab  Generic drug: B complex-vitamin C-folic acid     sevelamer carbonate 800 mg Tab  Commonly known as: RENVELA     TRESIBA U-100 INSULIN 100 unit/mL injection  Generic drug: insulin degludec               Where to Get Your Medications        These medications were sent to [facility] [place] [id number]      Hours: Mon-Fri 7a-7p, Sat-Sun 10a-4p Phone: [phone]   atorvastatin 10 MG tablet  BD ULTRA-FINE DEREK PEN NEEDLE 32 gauge x 5/32" Ndle  calcitRIOL 0.5 MCG Cap  carvediloL 12.5 MG tablet  famotidine 20 MG tablet  HumaLOG KwikPen Insulin 100 unit/mL pen  ketoconazole 200 mg Tab  mycophenolate 250 mg Cap  NIFEdipine 30 MG (OSM) 24 hr tablet  oxyCODONE 5 MG immediate release tablet  PHOSPHA 250 NEUTRAL 250 mg Tab  predniSONE 5 MG tablet  SEMGLEE(INSULIN GLARG-YFGN) unit/mL (3 mL) Inpn  sodium bicarbonate 650 MG tablet  sulfamethoxazole-trimethoprim 400-80mg 400-80 mg per tablet  tacrolimus 1 MG Cap  valGANciclovir 450 mg Tab       You can get these medications from any pharmacy    You don't need a prescription for these medications  bisacodyL 5 mg EC tablet  docusate sodium 100 MG capsule  multivitamin Tab          Pharmacy " Interventions/Recommendations:  1) Transplant Immunosuppression: Induction thymo, and maintenance tac 10/10 (+ keto 100 mg QD), mmf 1g BID, and pred taper    2) Opportunistic Infection prophylaxis:   PJP - Bactrim 1 QD (EOT 4/13/24)  CMV - Valcyte 450 mg QD (EOT 1/13/24)    3) Osteoporosis Prevention measures (liver txp): n/a    4) Patient Counseling/Education: Demonstrated the use of the BP cuff, thermometer.    5) Follow-Up/Discharge Needs:   Endocrinology f/u  Tacrolimus dosing (subtherapeutic on discharge with continual increase)  Switch to different capsule size once stable if able for pill burden  CMV ppx frequency/dosing with improvement in renal function    6) Patient Assistance Information: n/a    7) The following medications have been placed on HOLD and should be restarted in the outpatient setting (when appropriate): n/a    Heath and his caregiver verbalized their understanding and had the opportunity to ask questions.

## 2023-10-17 NOTE — PROGRESS NOTES
Discharge Note    Pt will discharge to [facility] under the care of  Krystleajay Hernandez, patient's wife, phone number [phone]. Pt and caregiver assisted with development of, and expressed agreement with, this discharge plan. Pt and caregiver did not express any concerns with the discharge plan. Pt denied experiencing SI/HI. Pt and caregiver presented without acute mental health concerns at time of assessment. SW remains available at [phone].

## 2023-10-17 NOTE — DISCHARGE SUMMARY
[facility] - Transplant Stepdown  Kidney Transplant  Discharge Summary    Patient Name: Heath Hernandez  MRN: [id number]  Admission Date: 10/12/2023  Hospital Length of Stay: 5 days  Discharge Date and Time:  10/17/2023 12:59 PM  Attending Physician: Cuauhtemoc Morales MD   Discharging Provider: Cheli Meyer PA-C  Primary Care Provider: Sally Primary Doctor    HPI:   Mr. Hernandez is a 62 y.o. year old White male who has been approved to receive a living related kidney transplant.  He has advanced kidney disease secondary to diabetic nephropathy and IgA nephropathy. Patient is on peritoneal dialysis.       Procedure(s) (LRB):  LAPAROTOMY, EXPLORATORY (N/A)  EXPLORATION, KIDNEY TRANSPLANT (N/A)  EVACUATION, HEMATOMA (N/A)     Hospital Course:    Mr. Hernandez is a 64 yo man who received a LRRT 10/12/23 from his niece for IgA nephropathy and DMII. PD cath removed as well. Surgery went well, 2 day herrmann, no drain. Post op labs stable, K a little elevated at 5.2 so shifted and given lasix with improvement in K on next lab check. Was making great UOP immediately post op.  On POD1 early AM, had acute drop in UOP.  Taken back to OR 10/13 and found to have extensive subcapsular hematoma causing page kidney.  After decompression, UOP and kidney function improving. Making good urine and Cr trending down nicely. H/H dropped 10/16 requiring 1u prbc. US with 9.7 x 2.5 x 5.4 cm collection. Reviewed by staff surgeon. No plans for intervention at this time. KEYA drain remains in place due to high output (>650cc). SS drainage. Cr negative for urine leak. Patient educated on drain management. Plan to discharge in place and remove in clinic next week. H/H stable. Electrolytes replaced.     Pt is stable and ready for discharge. Encouraged to continue fluid intake. He has no complaints. He has met with PharmD and received education. Pt expressed understanding of discharge instructions and importance of follow-up.       Goals of Care Treatment  Preferences:  Code Status: Full Code      Final Active Diagnoses:    Diagnosis Date Noted POA    PRINCIPAL PROBLEM:  S/P kidney transplant [Z94.0] 10/13/2023 Not Applicable    Hypophosphatemia [E83.39] 10/16/2023 No    Acute blood loss anemia [D62] 10/13/2023 No    Anemia, chronic disease [D63.8] 10/13/2023 Yes    Long-term use of immunosuppressant medication [Z79.60] 10/13/2023 Not Applicable    Prophylactic immunotherapy [Z29.89] 10/13/2023 Not Applicable    At risk for opportunistic infections [Z91.89] 10/13/2023 No    Benign hypertension [I10] 10/06/2021 Yes    Type 2 diabetes mellitus with renal complication [E11.29] 10/06/2021 Yes      Problems Resolved During this Admission:       Treatments: as above    Consults (From admission, onward)        Status Ordering Provider     Inpatient consult to Endocrinology  Once        Provider:  (Not yet assigned)    Completed CHRIS RODRIGUEZ     Inpatient consult to Registered Dietitian/Nutritionist  Once        Provider:  (Not yet assigned)    Completed ELIANA JENNINGS     Inpatient consult to Transplant Nephrology ([facility])  Once        Provider:  (Not yet assigned)    Acknowledged ELIANA JENNINGS          Pending Diagnostic Studies:     None        Significant Diagnostic Studies: Labs:   CMP   Recent Labs   Lab 10/16/23  0510 10/17/23  0521    139   K 4.3 3.9   * 111*   CO2 23 21*    93   BUN 34* 30*   CREATININE 2.1* 1.8*   CALCIUM 7.8* 7.8*   ALBUMIN 2.3* 2.4*   ANIONGAP 6* 7*   , CBC   Recent Labs   Lab 10/16/23  0510 10/17/23  0521   WBC 3.39* 4.43   HGB 6.8* 7.3*   HCT 20.5* 22.4*   PLT 98* 112*    and All labs within the past 24 hours have been reviewed    Discharged Condition: good    Disposition: Home or Self Care    Follow Up:    Patient Instructions:      Diet diabetic     Notify your health care provider if you experience any of the following:  temperature >100.4     Notify your health care provider if you experience any of the  following:  persistent nausea and vomiting or diarrhea     Notify your health care provider if you experience any of the following:  severe uncontrolled pain     Notify your health care provider if you experience any of the following:  redness, tenderness, or signs of infection (pain, swelling, redness, odor or green/yellow discharge around incision site)     Notify your health care provider if you experience any of the following:  difficulty breathing or increased cough     Notify your health care provider if you experience any of the following:  severe persistent headache     Notify your health care provider if you experience any of the following:  worsening rash     Notify your health care provider if you experience any of the following:  persistent dizziness, light-headedness, or visual disturbances     Notify your health care provider if you experience any of the following:  increased confusion or weakness     Activity as tolerated     Medications:  Reconciled Home Medications:      Medication List      START taking these medications    bisacodyL 5 mg EC tablet  Commonly known as: DULCOLAX  Take 1 tablet (5 mg total) by mouth daily as needed for Constipation.     calcitRIOL 0.5 MCG Cap  Commonly known as: ROCALTROL  Take 1 capsule (0.5 mcg total) by mouth once daily.  Start taking on: October 18, 2023     docusate sodium 100 MG capsule  Commonly known as: COLACE  Take 1 capsule (100 mg total) by mouth 2 (two) times daily as needed for Constipation.     famotidine 20 MG tablet  Commonly known as: PEPCID  Take 1 tablet (20 mg total) by mouth every evening.     insulin glargine-yfgn 100 unit/mL (3 mL) Inpn  Commonly known as: SEMGLEE(INSULIN GLARG-YFGN)PEN  Inject 8 Units into the skin once daily.  Start taking on: October 18, 2023     insulin lispro 100 unit/mL pen  Inject 3 Units into the skin 3 (three) times daily with meals. + sliding scale (Max Total daily dose = 24 units). DISCARD PEN AFTER 28 DAYS AND  "REFILL.     k phos di & mono-sod phos mono 250 mg Tab  Commonly known as: PHOSPHA 250 NEUTRAL  Take 2 tablets by mouth 2 (two) times a day.     ketoconazole 200 mg Tab  Commonly known as: NIZORAL  Take ½ tablet (100 mg total) by mouth once daily.  Start taking on: October 18, 2023     multivitamin Tab  Take 1 tablet by mouth once daily.  Start taking on: October 18, 2023     mycophenolate 250 mg Cap  Commonly known as: CELLCEPT  Take 4 capsules (1,000 mg total) by mouth 2 (two) times daily.     NIFEdipine 30 MG (OSM) 24 hr tablet  Commonly known as: PROCARDIA-XL  Take 1 tablet (30 mg total) by mouth once daily. HOLD SBP <140     oxyCODONE 5 MG immediate release tablet  Commonly known as: ROXICODONE  Take 1 tablet (5 mg total) by mouth every 8 (eight) hours as needed for Pain.     pen needle, diabetic 32 gauge x 5/32" Ndle  Use 1 pen needle to inject insulin 4 (four) times daily.     predniSONE 5 MG tablet  Commonly known as: DELTASONE  Take by mouth daily: 10/15 to 10/21 20 mg, 10/22 to 10/28 15 mg, 10/29 to 11/4 10 mg, 11/5/23 take 5mg daily indefinitely. DO NOT STOP     sulfamethoxazole-trimethoprim 400-80mg 400-80 mg per tablet  Commonly known as: BACTRIM,SEPTRA  Take 1 tablet by mouth once daily. STOP: 4/13/24     tacrolimus 1 MG Cap  Commonly known as: PROGRAF  Take 10 capsules (10 mg total) by mouth every 12 (twelve) hours.     valGANciclovir 450 mg Tab  Commonly known as: VALCYTE  Take 1 tablet (450 mg total) by mouth once daily. STOP: 1/13/24        CHANGE how you take these medications    carvediloL 12.5 MG tablet  Commonly known as: COREG  Take 1 tablet (12.5 mg total) by mouth 2 (two) times daily. HOLD SBP <130, HR <60  What changed:   · medication strength  · how much to take  · additional instructions     sodium bicarbonate 650 MG tablet  Take 1 tablet (650 mg total) by mouth 2 (two) times daily.  What changed: when to take this        CONTINUE taking these medications    atorvastatin 10 MG " tablet  Commonly known as: LIPITOR  Take 1 tablet (10 mg total) by mouth once daily.     clonazePAM 1 MG tablet  Commonly known as: KlonoPIN  Take 1 mg by mouth nightly as needed.        STOP taking these medications    amLODIPine 5 MG tablet  Commonly known as: NORVASC     AURYXIA 210 mg iron Tab  Generic drug: ferric citrate     furosemide 40 MG tablet  Commonly known as: LASIX     gentamicin 0.1 % cream  Commonly known as: GARAMYCIN     losartan 100 MG tablet  Commonly known as: COZAAR     CRISTIAN-ESEQUIEL 0.8 mg Tab  Generic drug: B complex-vitamin C-folic acid     sevelamer carbonate 800 mg Tab  Commonly known as: RENVELA     TRESIBA U-100 INSULIN 100 unit/mL injection  Generic drug: insulin degludec          Time spent caring for patient (Greater than 1/2 spent in direct face-to-face contact): > 30 minutes    Cheli Meyer PA-C  Kidney Transplant  [facility] - Transplant Stepdown

## 2023-10-17 NOTE — PROGRESS NOTES
IV hydralazine given for elevated BP per patient's PRN orders.     10/16/23 1915 10/16/23 1930   Vital Signs   BP (!) 190/87 (!) 184/84   MAP (mmHg) 125 120   BP Location Left arm Right arm   BP Method Automatic Automatic   Patient Position Lying Lying

## 2023-10-18 ENCOUNTER — CLINICAL SUPPORT (OUTPATIENT)
Dept: TRANSPLANT | Facility: CLINIC | Age: 63
End: 2023-10-18
Payer: COMMERCIAL

## 2023-10-18 ENCOUNTER — PATIENT MESSAGE (OUTPATIENT)
Dept: TRANSPLANT | Facility: CLINIC | Age: 63
End: 2023-10-18

## 2023-10-18 ENCOUNTER — TELEPHONE (OUTPATIENT)
Dept: TRANSPLANT | Facility: CLINIC | Age: 63
End: 2023-10-18

## 2023-10-18 ENCOUNTER — LAB VISIT (OUTPATIENT)
Dept: LAB | Facility: HOSPITAL | Age: 63
End: 2023-10-18
Attending: INTERNAL MEDICINE
Payer: COMMERCIAL

## 2023-10-18 VITALS
RESPIRATION RATE: 18 BRPM | SYSTOLIC BLOOD PRESSURE: 144 MMHG | DIASTOLIC BLOOD PRESSURE: 65 MMHG | WEIGHT: 178.81 LBS | OXYGEN SATURATION: 100 % | HEART RATE: 96 BPM | HEIGHT: 70 IN | BODY MASS INDEX: 25.6 KG/M2 | TEMPERATURE: 98 F

## 2023-10-18 DIAGNOSIS — Z94.0 KIDNEY REPLACED BY TRANSPLANT: ICD-10-CM

## 2023-10-18 LAB
ALBUMIN SERPL BCP-MCNC: 2.8 G/DL (ref 3.5–5.2)
ANION GAP SERPL CALC-SCNC: 6 MMOL/L (ref 8–16)
BASOPHILS # BLD AUTO: 0.01 K/UL (ref 0–0.2)
BASOPHILS NFR BLD: 0.2 % (ref 0–1.9)
BUN SERPL-MCNC: 25 MG/DL (ref 8–23)
CALCIUM SERPL-MCNC: 8.4 MG/DL (ref 8.7–10.5)
CHLORIDE SERPL-SCNC: 113 MMOL/L (ref 95–110)
CO2 SERPL-SCNC: 24 MMOL/L (ref 23–29)
CREAT SERPL-MCNC: 1.7 MG/DL (ref 0.5–1.4)
DIFFERENTIAL METHOD: ABNORMAL
EOSINOPHIL # BLD AUTO: 0.1 K/UL (ref 0–0.5)
EOSINOPHIL NFR BLD: 2.2 % (ref 0–8)
ERYTHROCYTE [DISTWIDTH] IN BLOOD BY AUTOMATED COUNT: 14.7 % (ref 11.5–14.5)
EST. GFR  (NO RACE VARIABLE): 45.3 ML/MIN/1.73 M^2
GLUCOSE SERPL-MCNC: 114 MG/DL (ref 70–110)
HCT VFR BLD AUTO: 25.5 % (ref 40–54)
HGB BLD-MCNC: 8.3 G/DL (ref 14–18)
IMM GRANULOCYTES # BLD AUTO: 0.09 K/UL (ref 0–0.04)
IMM GRANULOCYTES NFR BLD AUTO: 2 % (ref 0–0.5)
LYMPHOCYTES # BLD AUTO: 0.2 K/UL (ref 1–4.8)
LYMPHOCYTES NFR BLD: 3.6 % (ref 18–48)
MAGNESIUM SERPL-MCNC: 1.8 MG/DL (ref 1.6–2.6)
MCH RBC QN AUTO: 29.4 PG (ref 27–31)
MCHC RBC AUTO-ENTMCNC: 32.5 G/DL (ref 32–36)
MCV RBC AUTO: 90 FL (ref 82–98)
MONOCYTES # BLD AUTO: 0.4 K/UL (ref 0.3–1)
MONOCYTES NFR BLD: 9.3 % (ref 4–15)
NEUTROPHILS # BLD AUTO: 3.7 K/UL (ref 1.8–7.7)
NEUTROPHILS NFR BLD: 82.7 % (ref 38–73)
NRBC BLD-RTO: 0 /100 WBC
PHOSPHATE SERPL-MCNC: 2.4 MG/DL (ref 2.7–4.5)
PLATELET # BLD AUTO: 160 K/UL (ref 150–450)
PMV BLD AUTO: 10.9 FL (ref 9.2–12.9)
POTASSIUM SERPL-SCNC: 4.1 MMOL/L (ref 3.5–5.1)
RBC # BLD AUTO: 2.82 M/UL (ref 4.6–6.2)
SODIUM SERPL-SCNC: 143 MMOL/L (ref 136–145)
TACROLIMUS BLD-MCNC: 8.1 NG/ML (ref 5–15)
WBC # BLD AUTO: 4.5 K/UL (ref 3.9–12.7)

## 2023-10-18 PROCEDURE — 80197 ASSAY OF TACROLIMUS: CPT | Performed by: INTERNAL MEDICINE

## 2023-10-18 PROCEDURE — 36415 COLL VENOUS BLD VENIPUNCTURE: CPT | Performed by: INTERNAL MEDICINE

## 2023-10-18 PROCEDURE — 85025 COMPLETE CBC W/AUTO DIFF WBC: CPT | Performed by: INTERNAL MEDICINE

## 2023-10-18 PROCEDURE — 99999 PR PBB SHADOW E&M-EST. PATIENT-LVL III: CPT | Mod: PBBFAC,,,

## 2023-10-18 PROCEDURE — 99999 PR PBB SHADOW E&M-EST. PATIENT-LVL III: ICD-10-PCS | Mod: PBBFAC,,,

## 2023-10-18 PROCEDURE — 83735 ASSAY OF MAGNESIUM: CPT | Performed by: INTERNAL MEDICINE

## 2023-10-18 PROCEDURE — 80069 RENAL FUNCTION PANEL: CPT | Performed by: INTERNAL MEDICINE

## 2023-10-18 NOTE — PROGRESS NOTES
Clinic Note: First Return to Clinic Post-  Kidney Transplant    Heath Hernandez  is a 63 y.o. male  S/p LEFT KIDNEY   transplant on 10/12/2023 (Kidney) for Diabetes Mellitus - Type II.      Discharge Course (Issues/Concerns): Mr Hernandez is s/p kidney transplant from 10/12/23.  Post operative course complicated by exlap on 10/13 for a hematoma.  Today, pt presents to clinic feeling well and without complaints.     Objective:   Vitals:    10/18/23 0929   BP: (!) 144/65   Pulse: 96   Resp: 18   Temp: 97.7 °F (36.5 °C)       Met with patient and his caregiver in the clinic to review current medication list.     Current Outpatient Medications   Medication Sig Dispense Refill    atorvastatin (LIPITOR) 10 MG tablet Take 1 tablet (10 mg total) by mouth once daily. 30 tablet 11    calcitRIOL (ROCALTROL) 0.5 MCG Cap Take 1 capsule (0.5 mcg total) by mouth once daily. 30 capsule 11    carvediloL (COREG) 12.5 MG tablet Take 1 tablet (12.5 mg total) by mouth 2 (two) times daily. HOLD SBP <130, HR <60 60 tablet 11    famotidine (PEPCID) 20 MG tablet Take 1 tablet (20 mg total) by mouth every evening. 30 tablet 0    insulin glargine-yfgn (SEMGLEE,INSULIN GLARG-YFGN,PEN) 100 unit/mL (3 mL) InPn Inject 8 Units into the skin once daily. 3 mL 11    insulin lispro 100 unit/mL pen Inject 3 Units into the skin 3 (three) times daily with meals. + sliding scale (Max Total daily dose = 24 units). DISCARD PEN AFTER 28 DAYS AND REFILL. 6 mL 11    k phos di & mono-sod phos mono (PHOSPHA 250 NEUTRAL) 250 mg Tab Take 2 tablets by mouth 2 (two) times a day. 60 tablet 3    ketoconazole (NIZORAL) 200 mg Tab Take ½ tablet (100 mg total) by mouth once daily. 30 tablet 11    multivitamin Tab Take 1 tablet by mouth once daily. 30 tablet 11    mycophenolate (CELLCEPT) 250 mg Cap Take 4 capsules (1,000 mg total) by mouth 2 (two) times daily. 240 capsule 11    NIFEdipine (PROCARDIA-XL) 30 MG (OSM) 24 hr tablet Take 1 tablet (30 mg total) by mouth once  "daily. HOLD SBP <140 30 tablet 11    predniSONE (DELTASONE) 5 MG tablet Take by mouth daily: 10/15 to 10/21 20 mg, 10/22 to 10/28 15 mg, 10/29 to 11/4 10 mg, 11/5/23 take 5mg daily indefinitely. DO NOT STOP 70 tablet 11    sodium bicarbonate 650 MG tablet Take 1 tablet (650 mg total) by mouth 2 (two) times daily. 60 tablet 2    sulfamethoxazole-trimethoprim 400-80mg (BACTRIM,SEPTRA) 400-80 mg per tablet Take 1 tablet by mouth once daily. STOP: 4/13/24 30 tablet 5    tacrolimus (PROGRAF) 1 MG Cap Take 10 capsules (10 mg total) by mouth every 12 (twelve) hours. 600 capsule 11    valGANciclovir (VALCYTE) 450 mg Tab Take 1 tablet (450 mg total) by mouth once daily. STOP: 1/13/24 30 tablet 2    bisacodyL (DULCOLAX) 5 mg EC tablet Take 1 tablet (5 mg total) by mouth daily as needed for Constipation. 30 tablet 0    clonazePAM (KLONOPIN) 1 MG tablet Take 1 mg by mouth nightly as needed.      docusate sodium (COLACE) 100 MG capsule Take 1 capsule (100 mg total) by mouth 2 (two) times daily as needed for Constipation. 30 capsule 0    oxyCODONE (ROXICODONE) 5 MG immediate release tablet Take 1 tablet (5 mg total) by mouth every 8 (eight) hours as needed for Pain. 15 tablet 0    pen needle, diabetic 32 gauge x 5/32" Ndle Use 1 pen needle to inject insulin 4 (four) times daily. 100 each 11     No current facility-administered medications for this visit.       Pharmacy Interventions/Recommendations:     1) Graft Function & Immunosuppression Issues: tacrolimus + ketoconazole, MMF, and prednisone    2) Opportunistic Infection prophylaxis:   PCP ppx: bactrim STOP 4/13/24  CMV ppx: Valcyte STOP 1/13/24  Fungal ppx: none     3) Donor Serologies & Monitoring:     Donor CMV Status: positive  Donor HCV Status: negative  Donor HBcAb: negative  Donor HBV IKE: negative  Donor HCV IKE: negative      4) Pain Management & Bowel Regimen: Pain controlled without narcotics - discussed that he may take APAP if needed    5) Blood Pressure " Management: BP okay at 144/65 - has coreg and nifedipine with appropriate hold parameters    6) Blood Sugar Management & Follow-up: glucose controlled with current insulin regimen - fasting glucose this     7) Electrolyte Management: Phos improved after IV phos yesterday - will remain on KPN and will increase dietary phos; CO2 better today at 24, if remains at goal may be able to d/c bicarb supplements    8) Osteoporosis Prevention Strategy (Liver Transplant): n/a    9) OTHER medication follow-up (patient assistance, held medications, etc):   - Monitor phos - level improved today after IV phos  - Ketoconazole recently started for low FK levels - FK pending, will need to adjust dose accordingly    10) Reinforced medication education conducted in the hospital, including medication indications, dosing, administration, side effects, monitoring-- including timing of immunosuppressant levels.     Patient received their FIRST fill of medications from [facility].  Discussed the process for obtaining refills of medications, including verifying the dose and mailing address to have medications delivered.     Heath and his caregivers verbalized understanding and had the opportunity to ask questions.

## 2023-10-19 ENCOUNTER — PATIENT MESSAGE (OUTPATIENT)
Dept: TRANSPLANT | Facility: CLINIC | Age: 63
End: 2023-10-19
Payer: COMMERCIAL

## 2023-10-19 DIAGNOSIS — Z94.0 KIDNEY REPLACED BY TRANSPLANT: Primary | ICD-10-CM

## 2023-10-19 DIAGNOSIS — N18.9 ANEMIA OF RENAL DISEASE: ICD-10-CM

## 2023-10-19 DIAGNOSIS — D63.1 ANEMIA OF RENAL DISEASE: ICD-10-CM

## 2023-10-19 DIAGNOSIS — Z57.8 EMPLOYEE EXPOSURE TO BLOOD: ICD-10-CM

## 2023-10-19 DIAGNOSIS — N39.0 URINARY TRACT INFECTION WITHOUT HEMATURIA, SITE UNSPECIFIED: ICD-10-CM

## 2023-10-19 DIAGNOSIS — Z91.89 PERSONAL HISTORY OF POISONING, PRESENTING HAZARDS TO HEALTH: ICD-10-CM

## 2023-10-19 SDOH — SOCIAL DETERMINANTS OF HEALTH (SDOH): OCCUPATIONAL EXPOSURE TO OTHER RISK FACTORS: Z57.8

## 2023-10-20 ENCOUNTER — TELEPHONE (OUTPATIENT)
Dept: TRANSPLANT | Facility: CLINIC | Age: 63
End: 2023-10-20

## 2023-10-20 ENCOUNTER — OFFICE VISIT (OUTPATIENT)
Dept: TRANSPLANT | Facility: CLINIC | Age: 63
End: 2023-10-20
Payer: COMMERCIAL

## 2023-10-20 ENCOUNTER — PATIENT MESSAGE (OUTPATIENT)
Dept: TRANSPLANT | Facility: CLINIC | Age: 63
End: 2023-10-20

## 2023-10-20 ENCOUNTER — LAB VISIT (OUTPATIENT)
Dept: LAB | Facility: HOSPITAL | Age: 63
End: 2023-10-20
Attending: INTERNAL MEDICINE
Payer: COMMERCIAL

## 2023-10-20 VITALS
RESPIRATION RATE: 16 BRPM | TEMPERATURE: 97 F | WEIGHT: 175.25 LBS | HEART RATE: 90 BPM | HEIGHT: 70 IN | SYSTOLIC BLOOD PRESSURE: 121 MMHG | OXYGEN SATURATION: 100 % | BODY MASS INDEX: 25.09 KG/M2 | DIASTOLIC BLOOD PRESSURE: 68 MMHG

## 2023-10-20 DIAGNOSIS — Z94.0 S/P KIDNEY TRANSPLANT: ICD-10-CM

## 2023-10-20 DIAGNOSIS — Z94.0 KIDNEY REPLACED BY TRANSPLANT: ICD-10-CM

## 2023-10-20 DIAGNOSIS — N18.9 ANEMIA OF RENAL DISEASE: Primary | ICD-10-CM

## 2023-10-20 DIAGNOSIS — Z76.82 ORGAN TRANSPLANT CANDIDATE: Primary | ICD-10-CM

## 2023-10-20 DIAGNOSIS — Z29.89 PROPHYLACTIC IMMUNOTHERAPY: ICD-10-CM

## 2023-10-20 DIAGNOSIS — D63.1 ANEMIA OF RENAL DISEASE: Primary | ICD-10-CM

## 2023-10-20 LAB
ALBUMIN SERPL BCP-MCNC: 2.9 G/DL (ref 3.5–5.2)
ANION GAP SERPL CALC-SCNC: 7 MMOL/L (ref 8–16)
BASOPHILS # BLD AUTO: 0.03 K/UL (ref 0–0.2)
BASOPHILS NFR BLD: 0.6 % (ref 0–1.9)
BODY FLUID SOURCE, CREATININE: NORMAL
BUN SERPL-MCNC: 24 MG/DL (ref 8–23)
CALCIUM SERPL-MCNC: 8.4 MG/DL (ref 8.7–10.5)
CHLORIDE SERPL-SCNC: 111 MMOL/L (ref 95–110)
CO2 SERPL-SCNC: 23 MMOL/L (ref 23–29)
CREAT FLD-MCNC: 2.4 MG/DL
CREAT SERPL-MCNC: 1.9 MG/DL (ref 0.5–1.4)
DIFFERENTIAL METHOD: ABNORMAL
EOSINOPHIL # BLD AUTO: 0.1 K/UL (ref 0–0.5)
EOSINOPHIL NFR BLD: 1.4 % (ref 0–8)
ERYTHROCYTE [DISTWIDTH] IN BLOOD BY AUTOMATED COUNT: 14.9 % (ref 11.5–14.5)
EST. GFR  (NO RACE VARIABLE): 39.6 ML/MIN/1.73 M^2
GLUCOSE SERPL-MCNC: 127 MG/DL (ref 70–110)
HCT VFR BLD AUTO: 27.1 % (ref 40–54)
HGB BLD-MCNC: 8.9 G/DL (ref 14–18)
IMM GRANULOCYTES # BLD AUTO: 0.12 K/UL (ref 0–0.04)
IMM GRANULOCYTES NFR BLD AUTO: 2.4 % (ref 0–0.5)
LYMPHOCYTES # BLD AUTO: 0.4 K/UL (ref 1–4.8)
LYMPHOCYTES NFR BLD: 7.3 % (ref 18–48)
MAGNESIUM SERPL-MCNC: 1.5 MG/DL (ref 1.6–2.6)
MCH RBC QN AUTO: 30.1 PG (ref 27–31)
MCHC RBC AUTO-ENTMCNC: 32.8 G/DL (ref 32–36)
MCV RBC AUTO: 92 FL (ref 82–98)
MONOCYTES # BLD AUTO: 0.5 K/UL (ref 0.3–1)
MONOCYTES NFR BLD: 9.1 % (ref 4–15)
NEUTROPHILS # BLD AUTO: 3.9 K/UL (ref 1.8–7.7)
NEUTROPHILS NFR BLD: 79.2 % (ref 38–73)
NRBC BLD-RTO: 0 /100 WBC
PHOSPHATE SERPL-MCNC: 2.2 MG/DL (ref 2.7–4.5)
PHOSPHATE SERPL-MCNC: 2.2 MG/DL (ref 2.7–4.5)
PLATELET # BLD AUTO: 244 K/UL (ref 150–450)
PMV BLD AUTO: 10.6 FL (ref 9.2–12.9)
POTASSIUM SERPL-SCNC: 4.6 MMOL/L (ref 3.5–5.1)
RBC # BLD AUTO: 2.96 M/UL (ref 4.6–6.2)
SODIUM SERPL-SCNC: 141 MMOL/L (ref 136–145)
TACROLIMUS BLD-MCNC: 12.9 NG/ML (ref 5–15)
WBC # BLD AUTO: 4.92 K/UL (ref 3.9–12.7)

## 2023-10-20 PROCEDURE — 83735 ASSAY OF MAGNESIUM: CPT | Performed by: INTERNAL MEDICINE

## 2023-10-20 PROCEDURE — 4010F PR ACE/ARB THEARPY RXD/TAKEN: ICD-10-PCS | Mod: CPTII,S$GLB,, | Performed by: TRANSPLANT SURGERY

## 2023-10-20 PROCEDURE — 3008F BODY MASS INDEX DOCD: CPT | Mod: CPTII,S$GLB,, | Performed by: TRANSPLANT SURGERY

## 2023-10-20 PROCEDURE — 3078F DIAST BP <80 MM HG: CPT | Mod: CPTII,S$GLB,, | Performed by: TRANSPLANT SURGERY

## 2023-10-20 PROCEDURE — 3074F PR MOST RECENT SYSTOLIC BLOOD PRESSURE < 130 MM HG: ICD-10-PCS | Mod: CPTII,S$GLB,, | Performed by: TRANSPLANT SURGERY

## 2023-10-20 PROCEDURE — 85025 COMPLETE CBC W/AUTO DIFF WBC: CPT | Performed by: INTERNAL MEDICINE

## 2023-10-20 PROCEDURE — 3074F SYST BP LT 130 MM HG: CPT | Mod: CPTII,S$GLB,, | Performed by: TRANSPLANT SURGERY

## 2023-10-20 PROCEDURE — 3078F PR MOST RECENT DIASTOLIC BLOOD PRESSURE < 80 MM HG: ICD-10-PCS | Mod: CPTII,S$GLB,, | Performed by: TRANSPLANT SURGERY

## 2023-10-20 PROCEDURE — 99213 OFFICE O/P EST LOW 20 MIN: CPT | Mod: 24,S$GLB,, | Performed by: TRANSPLANT SURGERY

## 2023-10-20 PROCEDURE — 36415 COLL VENOUS BLD VENIPUNCTURE: CPT | Performed by: INTERNAL MEDICINE

## 2023-10-20 PROCEDURE — 99999 PR PBB SHADOW E&M-EST. PATIENT-LVL IV: ICD-10-PCS | Mod: PBBFAC,,,

## 2023-10-20 PROCEDURE — 99999 PR PBB SHADOW E&M-EST. PATIENT-LVL IV: CPT | Mod: PBBFAC,,,

## 2023-10-20 PROCEDURE — 3044F PR MOST RECENT HEMOGLOBIN A1C LEVEL <7.0%: ICD-10-PCS | Mod: CPTII,S$GLB,, | Performed by: TRANSPLANT SURGERY

## 2023-10-20 PROCEDURE — 1160F PR REVIEW ALL MEDS BY PRESCRIBER/CLIN PHARMACIST DOCUMENTED: ICD-10-PCS | Mod: CPTII,S$GLB,, | Performed by: TRANSPLANT SURGERY

## 2023-10-20 PROCEDURE — 1160F RVW MEDS BY RX/DR IN RCRD: CPT | Mod: CPTII,S$GLB,, | Performed by: TRANSPLANT SURGERY

## 2023-10-20 PROCEDURE — 1111F PR DISCHARGE MEDS RECONCILED W/ CURRENT OUTPATIENT MED LIST: ICD-10-PCS | Mod: CPTII,S$GLB,, | Performed by: TRANSPLANT SURGERY

## 2023-10-20 PROCEDURE — 82570 ASSAY OF URINE CREATININE: CPT | Performed by: TRANSPLANT SURGERY

## 2023-10-20 PROCEDURE — 3044F HG A1C LEVEL LT 7.0%: CPT | Mod: CPTII,S$GLB,, | Performed by: TRANSPLANT SURGERY

## 2023-10-20 PROCEDURE — 1159F MED LIST DOCD IN RCRD: CPT | Mod: CPTII,S$GLB,, | Performed by: TRANSPLANT SURGERY

## 2023-10-20 PROCEDURE — 3066F NEPHROPATHY DOC TX: CPT | Mod: CPTII,S$GLB,, | Performed by: TRANSPLANT SURGERY

## 2023-10-20 PROCEDURE — 80069 RENAL FUNCTION PANEL: CPT | Performed by: INTERNAL MEDICINE

## 2023-10-20 PROCEDURE — 4010F ACE/ARB THERAPY RXD/TAKEN: CPT | Mod: CPTII,S$GLB,, | Performed by: TRANSPLANT SURGERY

## 2023-10-20 PROCEDURE — 1111F DSCHRG MED/CURRENT MED MERGE: CPT | Mod: CPTII,S$GLB,, | Performed by: TRANSPLANT SURGERY

## 2023-10-20 PROCEDURE — 1159F PR MEDICATION LIST DOCUMENTED IN MEDICAL RECORD: ICD-10-PCS | Mod: CPTII,S$GLB,, | Performed by: TRANSPLANT SURGERY

## 2023-10-20 PROCEDURE — 3066F PR DOCUMENTATION OF TREATMENT FOR NEPHROPATHY: ICD-10-PCS | Mod: CPTII,S$GLB,, | Performed by: TRANSPLANT SURGERY

## 2023-10-20 PROCEDURE — 80197 ASSAY OF TACROLIMUS: CPT | Performed by: INTERNAL MEDICINE

## 2023-10-20 PROCEDURE — 99213 PR OFFICE/OUTPT VISIT, EST, LEVL III, 20-29 MIN: ICD-10-PCS | Mod: 24,S$GLB,, | Performed by: TRANSPLANT SURGERY

## 2023-10-20 PROCEDURE — 3008F PR BODY MASS INDEX (BMI) DOCUMENTED: ICD-10-PCS | Mod: CPTII,S$GLB,, | Performed by: TRANSPLANT SURGERY

## 2023-10-20 NOTE — TELEPHONE ENCOUNTER
----- Message from Gloria Turner MD sent at 10/20/2023  1:44 PM CDT -----  Tac to 10/9 mg and check lab on Monday

## 2023-10-20 NOTE — PROGRESS NOTES
Cr went up to 1.9. any sickness? He needs more fluid intake. Repeat lab on Monday  Check iron panel, ferritin, B12 and folate on Monday

## 2023-10-20 NOTE — TELEPHONE ENCOUNTER
Labs Monday   Patient feels fine. He was seen by Dr. Chappell this morning. sent drain for Cr. Still with ~600 cc outpt from drain. His intake is almost exactly his outpt.  Pt stated he will drink more water. Will see surgeon again on Wednesday as well as Annika.     ----- Message from Gloria Turner MD sent at 10/20/2023 11:20 AM CDT -----  Cr went up to 1.9. any sickness? He needs more fluid intake. Repeat lab on Monday  Check iron panel, ferritin, B12 and folate on Monday

## 2023-10-20 NOTE — PROGRESS NOTES
Transplant Surgery  Kidney Transplant Recipient Follow-up    Referring Physician: Jannet Farias  Current Nephrologist: Jannet Farias    Subjective:     Chief Complaint: Heath Hernandez is a 63 y.o. year old White male who is status post Kidney transplant performed on 10/12/2023.    ORGAN: LEFT KIDNEY   Disease Etiology: Diabetes Mellitus - Type II  Donor Type: Living   Donor CMV Status:    Donor HBcAB:    Donor HCV Status:      History of Present Illness: He reports no concerns. He returned to OR for subcapsular and retroperitoneal hematomas. Has a drain ~500-600 cc, serosanguineous.   From a transplant perspective, he reports normal urination, no incisional pain, and no dysuria.  Heath is here for management of his immunosuppression medication.  Heath states that his immunosuppression is being well tolerated.  Hypertension is is reportedly well controlled.    External provider notes reviewed: Yes    Review of Systems    Objective:     Physical Exam  Lab Results   Component Value Date    CREATININE 1.9 (H) 10/20/2023    BUN 24 (H) 10/20/2023     Lab Results   Component Value Date    WBC 4.92 10/20/2023    HGB 8.9 (L) 10/20/2023    HCT 27.1 (L) 10/20/2023    HCT 34 (L) 10/12/2023     10/20/2023     Lab Results   Component Value Date    TACROLIMUS 8.1 10/18/2023       Diagnostics:  The following labs have been reviewed: CBC  BMP  TACROLIMUS LEVEL  The following radiology images have been independently reviewed and interpreted: Kidney US    Assessment and Plan:        S/P Kidney transplant. Staple creatinine ~ 1.9 mg/dl. Keep the drain in place.   Chronic immunosuppressive medications for rejection prophylaxis at target.  Plan: no adjustment needed.  Continue monitoring symptoms, labs and drug levels for drug-related toxicity and side effects.  Renal hypertension at target.    Additional testing to be completed according to the Kidney: Written Order Guideline for Kidney Transplant Follow-Up  (KI-09)    Interpretation of tests and discussion of patient management involves all members of the multidisciplinary transplant team.  Patient advised that it is recommended that all transplant candidates, and their close contacts and household members receive Covid vaccination.  Follow-up: Patient reminded to call with any health changes, since these can be early signs of significant complications.  Also, I advised the patient to be sure any new medications or changes of old medications are discussed with either a pharmacist, or physician knowledgeable with transplant to avoid rejection/drug toxicity related to significant drug interactions.    Albaro Hurst MD       [facility] Patient Status  Functional Status: 80% - Normal activity with effort: some symptoms of disease  Physical Capacity: No Limitations

## 2023-10-21 RX ORDER — TACROLIMUS 1 MG/1
CAPSULE ORAL
Qty: 600 CAPSULE | Refills: 11 | Status: SHIPPED | OUTPATIENT
Start: 2023-10-21 | End: 2023-10-25

## 2023-10-22 ENCOUNTER — TELEPHONE (OUTPATIENT)
Dept: TRANSPLANT | Facility: CLINIC | Age: 63
End: 2023-10-22
Payer: COMMERCIAL

## 2023-10-23 ENCOUNTER — LAB VISIT (OUTPATIENT)
Dept: LAB | Facility: HOSPITAL | Age: 63
End: 2023-10-23
Attending: INTERNAL MEDICINE
Payer: COMMERCIAL

## 2023-10-23 ENCOUNTER — PATIENT MESSAGE (OUTPATIENT)
Dept: TRANSPLANT | Facility: CLINIC | Age: 63
End: 2023-10-23

## 2023-10-23 DIAGNOSIS — Z94.0 KIDNEY REPLACED BY TRANSPLANT: ICD-10-CM

## 2023-10-23 DIAGNOSIS — N18.9 ANEMIA OF RENAL DISEASE: ICD-10-CM

## 2023-10-23 DIAGNOSIS — D63.1 ANEMIA OF RENAL DISEASE: ICD-10-CM

## 2023-10-23 LAB
ALBUMIN SERPL BCP-MCNC: 2.9 G/DL (ref 3.5–5.2)
ANION GAP SERPL CALC-SCNC: 6 MMOL/L (ref 8–16)
BASOPHILS # BLD AUTO: 0.02 K/UL (ref 0–0.2)
BASOPHILS NFR BLD: 0.3 % (ref 0–1.9)
BUN SERPL-MCNC: 21 MG/DL (ref 8–23)
CALCIUM SERPL-MCNC: 8.8 MG/DL (ref 8.7–10.5)
CHLORIDE SERPL-SCNC: 110 MMOL/L (ref 95–110)
CO2 SERPL-SCNC: 22 MMOL/L (ref 23–29)
CREAT SERPL-MCNC: 1.4 MG/DL (ref 0.5–1.4)
DIFFERENTIAL METHOD: ABNORMAL
EOSINOPHIL # BLD AUTO: 0 K/UL (ref 0–0.5)
EOSINOPHIL NFR BLD: 0.3 % (ref 0–8)
ERYTHROCYTE [DISTWIDTH] IN BLOOD BY AUTOMATED COUNT: 15.2 % (ref 11.5–14.5)
EST. GFR  (NO RACE VARIABLE): 57.2 ML/MIN/1.73 M^2
FERRITIN SERPL-MCNC: 567 NG/ML (ref 20–300)
FOLATE SERPL-MCNC: 18 NG/ML (ref 4–24)
GLUCOSE SERPL-MCNC: 178 MG/DL (ref 70–110)
HCT VFR BLD AUTO: 30.5 % (ref 40–54)
HGB BLD-MCNC: 9.7 G/DL (ref 14–18)
IMM GRANULOCYTES # BLD AUTO: 0.11 K/UL (ref 0–0.04)
IMM GRANULOCYTES NFR BLD AUTO: 1.6 % (ref 0–0.5)
IRON SERPL-MCNC: 23 UG/DL (ref 45–160)
LYMPHOCYTES # BLD AUTO: 0.4 K/UL (ref 1–4.8)
LYMPHOCYTES NFR BLD: 6.5 % (ref 18–48)
MAGNESIUM SERPL-MCNC: 1.4 MG/DL (ref 1.6–2.6)
MCH RBC QN AUTO: 29.7 PG (ref 27–31)
MCHC RBC AUTO-ENTMCNC: 31.8 G/DL (ref 32–36)
MCV RBC AUTO: 93 FL (ref 82–98)
MONOCYTES # BLD AUTO: 0.5 K/UL (ref 0.3–1)
MONOCYTES NFR BLD: 6.6 % (ref 4–15)
NEUTROPHILS # BLD AUTO: 5.8 K/UL (ref 1.8–7.7)
NEUTROPHILS NFR BLD: 84.7 % (ref 38–73)
NRBC BLD-RTO: 0 /100 WBC
PHOSPHATE SERPL-MCNC: 2.4 MG/DL (ref 2.7–4.5)
PLATELET # BLD AUTO: 340 K/UL (ref 150–450)
PMV BLD AUTO: 10.6 FL (ref 9.2–12.9)
POTASSIUM SERPL-SCNC: 5.1 MMOL/L (ref 3.5–5.1)
RBC # BLD AUTO: 3.27 M/UL (ref 4.6–6.2)
SATURATED IRON: 9 % (ref 20–50)
SODIUM SERPL-SCNC: 138 MMOL/L (ref 136–145)
TACROLIMUS BLD-MCNC: 18.4 NG/ML (ref 5–15)
TOTAL IRON BINDING CAPACITY: 268 UG/DL (ref 250–450)
TRANSFERRIN SERPL-MCNC: 181 MG/DL (ref 200–375)
VIT B12 SERPL-MCNC: 1501 PG/ML (ref 210–950)
WBC # BLD AUTO: 6.8 K/UL (ref 3.9–12.7)

## 2023-10-23 PROCEDURE — 83540 ASSAY OF IRON: CPT | Performed by: INTERNAL MEDICINE

## 2023-10-23 PROCEDURE — 83735 ASSAY OF MAGNESIUM: CPT | Performed by: INTERNAL MEDICINE

## 2023-10-23 PROCEDURE — 82607 VITAMIN B-12: CPT | Performed by: INTERNAL MEDICINE

## 2023-10-23 PROCEDURE — 82746 ASSAY OF FOLIC ACID SERUM: CPT | Performed by: INTERNAL MEDICINE

## 2023-10-23 PROCEDURE — 82728 ASSAY OF FERRITIN: CPT | Performed by: INTERNAL MEDICINE

## 2023-10-23 PROCEDURE — 84466 ASSAY OF TRANSFERRIN: CPT | Performed by: INTERNAL MEDICINE

## 2023-10-23 PROCEDURE — 85025 COMPLETE CBC W/AUTO DIFF WBC: CPT | Performed by: INTERNAL MEDICINE

## 2023-10-23 PROCEDURE — 80069 RENAL FUNCTION PANEL: CPT | Performed by: INTERNAL MEDICINE

## 2023-10-23 PROCEDURE — 80197 ASSAY OF TACROLIMUS: CPT | Performed by: INTERNAL MEDICINE

## 2023-10-23 PROCEDURE — 36415 COLL VENOUS BLD VENIPUNCTURE: CPT | Performed by: INTERNAL MEDICINE

## 2023-10-23 RX ORDER — HEPARIN 100 UNIT/ML
500 SYRINGE INTRAVENOUS
Status: CANCELLED | OUTPATIENT
Start: 2023-10-23

## 2023-10-23 RX ORDER — DIPHENHYDRAMINE HYDROCHLORIDE 50 MG/ML
50 INJECTION INTRAMUSCULAR; INTRAVENOUS ONCE AS NEEDED
Status: CANCELLED | OUTPATIENT
Start: 2023-10-23

## 2023-10-23 RX ORDER — EPINEPHRINE 0.3 MG/.3ML
0.3 INJECTION SUBCUTANEOUS ONCE AS NEEDED
Status: CANCELLED | OUTPATIENT
Start: 2023-10-23

## 2023-10-23 RX ORDER — SODIUM CHLORIDE 0.9 % (FLUSH) 0.9 %
10 SYRINGE (ML) INJECTION
Status: CANCELLED | OUTPATIENT
Start: 2023-10-23

## 2023-10-23 NOTE — TELEPHONE ENCOUNTER
"18:50  On call note:  Patient's wife called the on call coordinator with concerns because Mr. Hernandez forgot to take all of his morning meds.  I reviewed all of his meds with patient's wife. FK level 2 days ago was 12.8 and he is also on ketoconazole noted.I instructed him to take 10 mg FK now. He also was instructed to take all of his daily AM medications except his his Nifedipine. She was instructed to watch his blood pressure. She said it was not "too high". He took his coreg 12.5 mg at 18:50 as well. He will resume all medications at the prescribed dose and time in the morning. Patient has labs scheduled for the morning and he was instructed to inform his coordinator that he missed his AM meds and took his pm meds at 06:50 because his labs will not be an accurate trough. Patient's wife read back all instructions correctly.  Routed note to patient's post transplant coordinator.  "

## 2023-10-23 NOTE — TELEPHONE ENCOUNTER
----- Message from Gloria Turner MD sent at 10/23/2023  8:50 AM CDT -----  Cr trending down  nicely. Needs better sugar control.   Needs IV iron  High Mg diet. Mg oxide 800 mg daily

## 2023-10-23 NOTE — PROGRESS NOTES
Kidney Post-Transplant Assessment    Referring Physician: Jannet Farias  Current Nephrologist: Jannet Farias    ORGAN: LEFT KIDNEY  Donor Type: living  PHS Increased Risk: no  Cold Ischemia: 44 mins  Induction Medications: thymoglobulin    Subjective:     CC:  Reassessment of renal allograft function and management of chronic immunosuppression.    HPI:  Mr. Hernandez is a 63 y.o. year old White male with history of CKD secondary to IgA nephropathy and DM2 who received a living kidney transplant on 10/12/23 (Thymo induction, CMV ++).  His most recent creatinine is 1.6. He takes mycophenolate mofetil, prednisone, and tacrolimus for maintenance immunosuppression. His post transplant course has been complicated by perinephric hematoma requiring surgical evacuation (POD1).    Surgery visit today as well. DC from hospital with KEYA drain due to high output (>650 cc). Had surgery visit with Dr. Morales today, still with drain output but less. Fluid sent for creatinine and WBC. Plan for repeat US and if looks OK follow up with with surgery Friday for removal.    Drinking 2+ L water daily, no problems with urination. Did eat out prior to last labs with pizza. Has been holding prograf since 10/23 for elevated level 18.4. BP at goal, no peripheral edema. Tolerating IS without issues, no diarrhea or vomiting.     Current Outpatient Medications   Medication Sig Dispense Refill    atorvastatin (LIPITOR) 10 MG tablet Take 1 tablet (10 mg total) by mouth once daily. 30 tablet 11    bisacodyL (DULCOLAX) 5 mg EC tablet Take 1 tablet (5 mg total) by mouth daily as needed for Constipation. (Patient not taking: Reported on 10/25/2023) 30 tablet 0    calcitRIOL (ROCALTROL) 0.5 MCG Cap Take 1 capsule (0.5 mcg total) by mouth once daily. 30 capsule 11    carvediloL (COREG) 12.5 MG tablet Take 1 tablet (12.5 mg total) by mouth 2 (two) times daily. HOLD SBP <130, HR <60 60 tablet 11    clonazePAM (KLONOPIN) 1 MG tablet Take 1 mg by mouth  "nightly as needed.      docusate sodium (COLACE) 100 MG capsule Take 1 capsule (100 mg total) by mouth 2 (two) times daily as needed for Constipation. (Patient not taking: Reported on 10/25/2023) 30 capsule 0    famotidine (PEPCID) 20 MG tablet Take 1 tablet (20 mg total) by mouth every evening. 30 tablet 0    insulin glargine-yfgn (SEMGLEE,INSULIN GLARG-YFGN,PEN) 100 unit/mL (3 mL) InPn Inject 8 Units into the skin once daily. 3 mL 11    insulin lispro 100 unit/mL pen Inject 3 Units into the skin 3 (three) times daily with meals. + sliding scale (Max Total daily dose = 24 units). DISCARD PEN AFTER 28 DAYS AND REFILL. 6 mL 11    k phos di & mono-sod phos mono (PHOSPHA 250 NEUTRAL) 250 mg Tab Take 2 tablets by mouth 2 (two) times a day. 60 tablet 3    ketoconazole (NIZORAL) 200 mg Tab Take ½ tablet (100 mg total) by mouth once daily. 30 tablet 11    magnesium oxide (MAG-OX) 400 mg (241.3 mg magnesium) tablet Take 2 tablets (800 mg total) by mouth once daily. 60 tablet 11    multivitamin Tab Take 1 tablet by mouth once daily. 30 tablet 11    mycophenolate (CELLCEPT) 250 mg Cap Take 4 capsules (1,000 mg total) by mouth 2 (two) times daily. 240 capsule 11    NIFEdipine (PROCARDIA-XL) 30 MG (OSM) 24 hr tablet Take 1 tablet (30 mg total) by mouth once daily. HOLD SBP <140 30 tablet 11    oxyCODONE (ROXICODONE) 5 MG immediate release tablet Take 1 tablet (5 mg total) by mouth every 8 (eight) hours as needed for Pain. (Patient not taking: Reported on 10/25/2023) 15 tablet 0    pen needle, diabetic 32 gauge x 5/32" Ndle Use 1 pen needle to inject insulin 4 (four) times daily. 100 each 11    predniSONE (DELTASONE) 5 MG tablet Take by mouth daily: 10/15 to 10/21 20 mg, 10/22 to 10/28 15 mg, 10/29 to 11/4 10 mg, 11/5/23 take 5mg daily indefinitely. DO NOT STOP 70 tablet 11    sodium bicarbonate 650 MG tablet Take 1 tablet (650 mg total) by mouth 2 (two) times daily. 60 tablet 2    sulfamethoxazole-trimethoprim 400-80mg " "(BACTRIM,SEPTRA) 400-80 mg per tablet Take 1 tablet by mouth once daily. STOP: 4/13/24 30 tablet 5    tacrolimus (PROGRAF) 1 MG Cap Take 8 capsules (8 mg total) by mouth every morning AND 8 capsules (8 mg total) every evening. 480 capsule 11    valGANciclovir (VALCYTE) 450 mg Tab Take 1 tablet (450 mg total) by mouth once daily. STOP: 1/13/24 30 tablet 2     No current facility-administered medications for this visit.       Past Medical History:   Diagnosis Date    Anxiety     Cancer     Skin Cancer    Diabetes mellitus     Diabetes mellitus, type 2     Disorder of kidney and ureter     Hyperlipidemia     Hypertension          Review of Systems   Constitutional:  Positive for fatigue. Negative for activity change, appetite change and fever.   HENT:  Negative for congestion, mouth sores and sore throat.    Eyes:  Negative for visual disturbance.   Respiratory:  Negative for cough, chest tightness and shortness of breath.    Cardiovascular:  Negative for chest pain, palpitations and leg swelling.   Gastrointestinal:  Negative for abdominal distention, abdominal pain, constipation, diarrhea and nausea.   Genitourinary:  Negative for difficulty urinating, frequency and hematuria.   Musculoskeletal:  Negative for arthralgias and gait problem.   Skin:  Positive for wound.   Allergic/Immunologic: Positive for immunocompromised state. Negative for environmental allergies and food allergies.   Neurological:  Negative for dizziness, weakness and numbness.   Psychiatric/Behavioral:  Negative for sleep disturbance. The patient is not nervous/anxious.        Objective:   Blood pressure (!) 145/68, pulse 89, temperature 97.3 °F (36.3 °C), temperature source Tympanic, resp. rate 18, height 5' 10" (1.778 m), weight 76.1 kg (167 lb 12.3 oz), SpO2 98 %.body mass index is 24.07 kg/m².    Physical Exam  Vitals and nursing note reviewed.   Constitutional:       Appearance: Normal appearance.   HENT:      Head: Normocephalic. "   Cardiovascular:      Rate and Rhythm: Normal rate and regular rhythm.      Heart sounds: Normal heart sounds.   Pulmonary:      Effort: Pulmonary effort is normal.      Breath sounds: Normal breath sounds.   Abdominal:      General: Bowel sounds are normal. There is no distension.      Palpations: Abdomen is soft.      Tenderness: There is no abdominal tenderness.      Comments: KEYA drain   Musculoskeletal:         General: Normal range of motion.   Skin:     General: Skin is warm and dry.   Neurological:      General: No focal deficit present.      Mental Status: He is alert.   Psychiatric:         Behavior: Behavior normal.         Labs:  Lab Results   Component Value Date    WBC 6.87 10/25/2023    HGB 9.7 (L) 10/25/2023    HCT 30.1 (L) 10/25/2023     10/25/2023    K 5.1 10/25/2023     10/25/2023    CO2 23 10/25/2023    BUN 25 (H) 10/25/2023    CREATININE 1.6 (H) 10/25/2023    EGFRNORACEVR 48.7 (A) 10/25/2023    CALCIUM 9.0 10/25/2023    PHOS 2.3 (L) 10/25/2023    MG 1.5 (L) 10/25/2023    ALBUMIN 2.9 (L) 10/25/2023    AST 17 09/26/2023    ALT 21 09/26/2023    UTPCR 0.38 (H) 10/23/2023    .0 (H) 10/12/2023    .0 (H) 10/12/2023    TACROLIMUS 8.3 10/25/2023       Labs were reviewed with the patient    Assessment:     1. S/P kidney transplant    2. IgA nephropathy    3. Diabetic nephropathy associated with type 2 diabetes mellitus    4. Long-term use of immunosuppressant medication    5. Renovascular hypertension    6. At risk for opportunistic infections      Plan:   Restarted Prograf at decreased dose 8/8, repeating tac level and renal panel Friday  Drain fluid sent today, plans for US today with surgical review, possible DC KEYA Friday if acceptable  Ureteral stent removal 11/8        1. Immunosuppression: Prograf trough 8.3, which is therapeutic (target 8-10). Continue Prograf 8/8 (dose decreased and restarted today following holding since 10/23), Ketoconazole 100 mg QD to augment prograf  levels, MMF 1000 mg BID, and Prednisone taper. Will continue to monitor for drug toxicities    2. Allograft Function: slight bump in creatinine from 1.4 but did eat high salt meal, continue good po hydration.   - history of CKD secondary to IgA nephropathy and DM2 s/p  living kidney transplant on 10/12/23 (Thymo induction, CMV ++).    - post transplant course has been complicated by perinephric hematoma requiring surgical evacuation (POD1).  Lab Results   Component Value Date    CREATININE 1.6 (H) 10/25/2023      Latest Reference Range & Units POD 13,   Kidney-Post 1 Year  10/25/23 07:55   eGFR >60 mL/min/1.73 m^2 48.7 !       3. Hypertension management: advise low salt diet and home BP monitoring    Coreg 12.5 mg BID, Nifedipine 30 mg QD      4. Metabolic Bone Disease/Secondary Hyperparathyroidism:  Calcitriol 0.5 mcg QD, MagOx 800 mg QD,  mg BID  Lab Results   Component Value Date    .0 (H) 10/12/2023    .0 (H) 10/12/2023    CALCIUM 9.0 10/25/2023    PHOS 2.3 (L) 10/25/2023      Latest Reference Range & Units POD 13,   Kidney-Post 1 Year  10/25/23 07:55   Magnesium  1.6 - 2.6 mg/dL 1.5 (L)       5. Electrolytes:  Will monitor /guidelines  Sodium bicarb 650 mg BID  Lab Results   Component Value Date     10/25/2023    K 5.1 10/25/2023     10/25/2023    CO2 23 10/25/2023       6. Anemia: planning for IV iron  Lab Results   Component Value Date    WBC 6.87 10/25/2023    HGB 9.7 (L) 10/25/2023    HCT 30.1 (L) 10/25/2023    MCV 91 10/25/2023     10/25/2023         7. Cytopenias: no significant cytopenias. Medicine list reviewed including potential causes of drug-induced cytopenias    8.  Proteinuria: continue p/c ratio as per guidelines   [facility] [place] 0.38    9. BK virus infection screening:  will continue to monitor per guidelines    10. Weight education: provided during the clinic visit   Body mass index is 24.07 kg/m².     11.Patient safety education regarding  immunosuppression including prophylaxis posttransplant for CMV, PCP : Education provided about vaccination and prevention of infections     PCP ppx: bactrim STOP 4/13/24  CMV ppx: Valcyte STOP 1/13/24         Follow-up:   Clinic: return to transplant clinic weekly for the first month after transplant; every 2 weeks during months 2-3; then at 6-, 9-, 12-, 18-, 24-, and 36- months post-transplant to reassess for complications from immunosuppression toxicity and monitor for rejection.  Annually thereafter.    Labs: since patient remains at high risk for rejection and drug-related complications that warrant close monitoring, labs will be ordered as follows: continue twice weekly CBC, renal panel, and drug level for first month; then same labs once weekly through 3rd month post-transplant.  Urine for UA and protein/creatinine ratio monthly.  Serum BK - PCR at 1-, 3-, 6-, 9-, 12-, 18-, 24-, 36-, 48-, and 60 months post-transplant.  Hepatic panel at 1-, 2-, 3-, 6-, 9-, 12-, 18-, 24-, and 36- months post-transplant.    Annika Colon NP       Education:   Material provided to the patient.  Patient reminded to call with any health changes since these can be early signs of significant complications.  Also, I advised the patient to be sure any new medications or changes of old medications are discussed with either a pharmacist or physician knowledgeable with transplant to avoid rejection/drug toxicity related to significant drug interactions.    Patient advised that it is recommended that all transplanted patients, and their close contacts and household members receive Covid vaccination.

## 2023-10-23 NOTE — PROGRESS NOTES
Cr trending down  nicely. Needs better sugar control.   Needs IV iron  High Mg diet. Mg oxide 800 mg daily

## 2023-10-24 ENCOUNTER — TELEPHONE (OUTPATIENT)
Dept: TRANSPLANT | Facility: CLINIC | Age: 63
End: 2023-10-24
Payer: COMMERCIAL

## 2023-10-24 RX ORDER — LANOLIN ALCOHOL/MO/W.PET/CERES
800 CREAM (GRAM) TOPICAL DAILY
Qty: 60 TABLET | Refills: 11 | Status: SHIPPED | OUTPATIENT
Start: 2023-10-24 | End: 2024-10-24

## 2023-10-24 NOTE — PHYSICIAN QUERY
PT Name: Heath Hernandez  MR #: [id number]    DOCUMENTATION CLARIFICATION      CDS/: Roberta Alston RN             Contact information: [email or web address]    This form is a permanent document in the medical record.      Query Date: October 24, 2023    By submitting this query, we are merely seeking further clarification of documentation. Please utilize your independent clinical judgment when addressing the question(s) below.       Indicators Supporting Clinical Findings Location in Medical Record   x Anemia, Thrombocytopenia, Neutropenia, Pancytopenia documented Anemia, chronic disease  - h/h decreased but stable  - monitor with CBC    Acute blood loss anemia  - 2/2 transplant and takeback  - expected post op  - 1 unit 10/13   PN 10/13       Transplant Kidney   x H&H  10/14/23 06:13 10/15/23 07:03 10/16/23 05:10 10/17/23 05:21   Hemoglobin 7.4 (L) 8.0 (L) 6.8 (L) 7.3 (L)   Hematocrit 21.4 (L) 23.7 (L) 20.5 (L) 22.4 (L)    Lab   x WBC  10/14/23 06:13 10/15/23 07:03 10/16/23 05:10 10/17/23 05:21   WBC 6.85 3.89 (L) 3.39 (L) 4.43   RBC 2.41 (L) 2.64 (L) 2.27 (L) 2.50 (L)    Lab               x Neutrophils/ Granulocytes/ ANC  10/14/23 06:13 10/15/23 07:03 10/16/23 05:10 10/17/23 05:21   Gran # (ANC) 6.6 3.7 3.1 3.9    Lab              x Platelets  10/14/23 06:13 10/15/23 07:03 10/16/23 05:10 10/17/23 05:21   Platelet Count 110 (L) 103 (L) 98 (L) 112 (L)    Lab                  x Transfusion(s) Transfuse 1 unit PRBC Transfusion flowsheet  10/13   10/16    Treatments:     x Acute/ Chronic illness Status post renal transplant on 10/12/2023, with take back to the OR for drainage of a subcapsular hematoma on 10/13/2023    PN 10/14       Critical Care   x Other: Tentative  d/c tomorrow pending stability of blood counts.      Heparin injection every 8 hours                      tacrolimus capsule                                         PN 10/16       Transplant Kidney    MAR 10/12-10-17  MAR 10/13- 10/17    Pancytopenia is defined by:  Hb < 12g/dL (non-pregnant women) or <13g/dL (men) + ANC < 1800/microL + Platelets < 150,000/microL    The clinical guidelines noted are only a system guideline. It does not replace the providers clinical judgment.      Provider, please specify diagnosis associated with above clinical findings.    [   ] Pancytopenia due to other drug, please specify drug: _______   [   X ] Pancytopenia, unspecified   [   ] Thrombocytopenia only   [   ] Other  (please specify): ________   [  ] Clinically Undetermined           Please document in your progress notes daily for the duration of treatment, until resolved, and include in your discharge summary.    Reference:    ELADIO You ([email or web address].). Approach to the adult with pancytopenia. [employer]. Retrieved September 7, 2022, from https://www.[employer]/contents/approach-to-the-adult-with-pancytopenia?search=pancytopenia&source=search_result&selectedTitle=1~150&usage_type=default&display_rank=1    Form No. 85566

## 2023-10-25 ENCOUNTER — HOSPITAL ENCOUNTER (OUTPATIENT)
Dept: RADIOLOGY | Facility: HOSPITAL | Age: 63
Discharge: HOME | End: 2023-10-25
Attending: NURSE PRACTITIONER
Payer: COMMERCIAL

## 2023-10-25 ENCOUNTER — OFFICE VISIT (OUTPATIENT)
Dept: TRANSPLANT | Facility: CLINIC | Age: 63
End: 2023-10-25
Payer: COMMERCIAL

## 2023-10-25 VITALS
RESPIRATION RATE: 18 BRPM | HEIGHT: 70 IN | WEIGHT: 167.75 LBS | TEMPERATURE: 97 F | SYSTOLIC BLOOD PRESSURE: 145 MMHG | HEART RATE: 89 BPM | HEART RATE: 89 BPM | BODY MASS INDEX: 24.02 KG/M2 | HEIGHT: 70 IN | DIASTOLIC BLOOD PRESSURE: 68 MMHG | TEMPERATURE: 97 F | OXYGEN SATURATION: 98 % | BODY MASS INDEX: 24.02 KG/M2 | SYSTOLIC BLOOD PRESSURE: 145 MMHG | RESPIRATION RATE: 18 BRPM | DIASTOLIC BLOOD PRESSURE: 68 MMHG | OXYGEN SATURATION: 98 % | WEIGHT: 167.75 LBS

## 2023-10-25 DIAGNOSIS — Z94.0 KIDNEY REPLACED BY TRANSPLANT: ICD-10-CM

## 2023-10-25 DIAGNOSIS — E11.21 DIABETIC NEPHROPATHY ASSOCIATED WITH TYPE 2 DIABETES MELLITUS: ICD-10-CM

## 2023-10-25 DIAGNOSIS — Z94.0 S/P KIDNEY TRANSPLANT: Primary | ICD-10-CM

## 2023-10-25 DIAGNOSIS — Z94.0 KIDNEY REPLACED BY TRANSPLANT: Primary | ICD-10-CM

## 2023-10-25 DIAGNOSIS — Z51.81 ENCOUNTER FOR THERAPEUTIC DRUG MONITORING: Primary | ICD-10-CM

## 2023-10-25 DIAGNOSIS — Z79.60 LONG-TERM USE OF IMMUNOSUPPRESSANT MEDICATION: ICD-10-CM

## 2023-10-25 DIAGNOSIS — Z94.0 S/P KIDNEY TRANSPLANT: ICD-10-CM

## 2023-10-25 DIAGNOSIS — Z79.899 ENCOUNTER FOR LONG-TERM (CURRENT) USE OF OTHER MEDICATIONS: ICD-10-CM

## 2023-10-25 DIAGNOSIS — I15.0 RENOVASCULAR HYPERTENSION: ICD-10-CM

## 2023-10-25 DIAGNOSIS — N02.B9 IGA NEPHROPATHY: ICD-10-CM

## 2023-10-25 DIAGNOSIS — Z91.89 AT RISK FOR OPPORTUNISTIC INFECTIONS: ICD-10-CM

## 2023-10-25 LAB
APPEARANCE FLD: NORMAL
BODY FLD TYPE: NORMAL
BODY FLUID SOURCE, CREATININE: NORMAL
COLOR FLD: YELLOW
CREAT FLD-MCNC: 3.4 MG/DL
LYMPHOCYTES NFR FLD MANUAL: 4 %
MONOS+MACROS NFR FLD MANUAL: 12 %
NEUTROPHILS NFR FLD MANUAL: 84 %
WBC # FLD: 1613 /CU MM

## 2023-10-25 PROCEDURE — 99215 OFFICE O/P EST HI 40 MIN: CPT | Mod: S$GLB,,, | Performed by: NURSE PRACTITIONER

## 2023-10-25 PROCEDURE — 4010F ACE/ARB THERAPY RXD/TAKEN: CPT | Mod: CPTII,S$GLB,, | Performed by: NURSE PRACTITIONER

## 2023-10-25 PROCEDURE — 99999 PR PBB SHADOW E&M-EST. PATIENT-LVL V: ICD-10-PCS | Mod: PBBFAC,,,

## 2023-10-25 PROCEDURE — 99215 PR OFFICE/OUTPT VISIT, EST, LEVL V, 40-54 MIN: ICD-10-PCS | Mod: 24,S$GLB,, | Performed by: TRANSPLANT SURGERY

## 2023-10-25 PROCEDURE — 1111F DSCHRG MED/CURRENT MED MERGE: CPT | Mod: CPTII,S$GLB,, | Performed by: TRANSPLANT SURGERY

## 2023-10-25 PROCEDURE — 99215 PR OFFICE/OUTPT VISIT, EST, LEVL V, 40-54 MIN: ICD-10-PCS | Mod: S$GLB,,, | Performed by: NURSE PRACTITIONER

## 2023-10-25 PROCEDURE — 3078F DIAST BP <80 MM HG: CPT | Mod: CPTII,S$GLB,, | Performed by: TRANSPLANT SURGERY

## 2023-10-25 PROCEDURE — 3078F PR MOST RECENT DIASTOLIC BLOOD PRESSURE < 80 MM HG: ICD-10-PCS | Mod: CPTII,S$GLB,, | Performed by: NURSE PRACTITIONER

## 2023-10-25 PROCEDURE — 76776 US EXAM K TRANSPL W/DOPPLER: CPT | Mod: 26,,, | Performed by: RADIOLOGY

## 2023-10-25 PROCEDURE — 3044F HG A1C LEVEL LT 7.0%: CPT | Mod: CPTII,S$GLB,, | Performed by: TRANSPLANT SURGERY

## 2023-10-25 PROCEDURE — 1111F PR DISCHARGE MEDS RECONCILED W/ CURRENT OUTPATIENT MED LIST: ICD-10-PCS | Mod: CPTII,S$GLB,, | Performed by: NURSE PRACTITIONER

## 2023-10-25 PROCEDURE — 1160F PR REVIEW ALL MEDS BY PRESCRIBER/CLIN PHARMACIST DOCUMENTED: ICD-10-PCS | Mod: CPTII,S$GLB,, | Performed by: NURSE PRACTITIONER

## 2023-10-25 PROCEDURE — 99999 PR PBB SHADOW E&M-EST. PATIENT-LVL V: ICD-10-PCS | Mod: PBBFAC,,, | Performed by: NURSE PRACTITIONER

## 2023-10-25 PROCEDURE — 3008F BODY MASS INDEX DOCD: CPT | Mod: CPTII,S$GLB,, | Performed by: NURSE PRACTITIONER

## 2023-10-25 PROCEDURE — 3078F PR MOST RECENT DIASTOLIC BLOOD PRESSURE < 80 MM HG: ICD-10-PCS | Mod: CPTII,S$GLB,, | Performed by: TRANSPLANT SURGERY

## 2023-10-25 PROCEDURE — 99999 PR PBB SHADOW E&M-EST. PATIENT-LVL V: CPT | Mod: PBBFAC,,, | Performed by: NURSE PRACTITIONER

## 2023-10-25 PROCEDURE — 3077F SYST BP >= 140 MM HG: CPT | Mod: CPTII,S$GLB,, | Performed by: TRANSPLANT SURGERY

## 2023-10-25 PROCEDURE — 3077F PR MOST RECENT SYSTOLIC BLOOD PRESSURE >= 140 MM HG: ICD-10-PCS | Mod: CPTII,S$GLB,, | Performed by: NURSE PRACTITIONER

## 2023-10-25 PROCEDURE — 99215 OFFICE O/P EST HI 40 MIN: CPT | Mod: 24,S$GLB,, | Performed by: TRANSPLANT SURGERY

## 2023-10-25 PROCEDURE — 3078F DIAST BP <80 MM HG: CPT | Mod: CPTII,S$GLB,, | Performed by: NURSE PRACTITIONER

## 2023-10-25 PROCEDURE — 3066F PR DOCUMENTATION OF TREATMENT FOR NEPHROPATHY: ICD-10-PCS | Mod: CPTII,S$GLB,, | Performed by: TRANSPLANT SURGERY

## 2023-10-25 PROCEDURE — 1159F MED LIST DOCD IN RCRD: CPT | Mod: CPTII,S$GLB,, | Performed by: NURSE PRACTITIONER

## 2023-10-25 PROCEDURE — 3044F PR MOST RECENT HEMOGLOBIN A1C LEVEL <7.0%: ICD-10-PCS | Mod: CPTII,S$GLB,, | Performed by: TRANSPLANT SURGERY

## 2023-10-25 PROCEDURE — 3044F HG A1C LEVEL LT 7.0%: CPT | Mod: CPTII,S$GLB,, | Performed by: NURSE PRACTITIONER

## 2023-10-25 PROCEDURE — 3008F PR BODY MASS INDEX (BMI) DOCUMENTED: ICD-10-PCS | Mod: CPTII,S$GLB,, | Performed by: TRANSPLANT SURGERY

## 2023-10-25 PROCEDURE — 3008F PR BODY MASS INDEX (BMI) DOCUMENTED: ICD-10-PCS | Mod: CPTII,S$GLB,, | Performed by: NURSE PRACTITIONER

## 2023-10-25 PROCEDURE — 3066F NEPHROPATHY DOC TX: CPT | Mod: CPTII,S$GLB,, | Performed by: TRANSPLANT SURGERY

## 2023-10-25 PROCEDURE — 3077F PR MOST RECENT SYSTOLIC BLOOD PRESSURE >= 140 MM HG: ICD-10-PCS | Mod: CPTII,S$GLB,, | Performed by: TRANSPLANT SURGERY

## 2023-10-25 PROCEDURE — 76776 US TRANSPLANT KIDNEY WITH DOPPLER: ICD-10-PCS | Mod: 26,,, | Performed by: RADIOLOGY

## 2023-10-25 PROCEDURE — 3044F PR MOST RECENT HEMOGLOBIN A1C LEVEL <7.0%: ICD-10-PCS | Mod: CPTII,S$GLB,, | Performed by: NURSE PRACTITIONER

## 2023-10-25 PROCEDURE — 1111F PR DISCHARGE MEDS RECONCILED W/ CURRENT OUTPATIENT MED LIST: ICD-10-PCS | Mod: CPTII,S$GLB,, | Performed by: TRANSPLANT SURGERY

## 2023-10-25 PROCEDURE — 3066F PR DOCUMENTATION OF TREATMENT FOR NEPHROPATHY: ICD-10-PCS | Mod: CPTII,S$GLB,, | Performed by: NURSE PRACTITIONER

## 2023-10-25 PROCEDURE — 76776 US EXAM K TRANSPL W/DOPPLER: CPT | Mod: TC

## 2023-10-25 PROCEDURE — 1159F PR MEDICATION LIST DOCUMENTED IN MEDICAL RECORD: ICD-10-PCS | Mod: CPTII,S$GLB,, | Performed by: NURSE PRACTITIONER

## 2023-10-25 PROCEDURE — 4010F ACE/ARB THERAPY RXD/TAKEN: CPT | Mod: CPTII,S$GLB,, | Performed by: TRANSPLANT SURGERY

## 2023-10-25 PROCEDURE — 3008F BODY MASS INDEX DOCD: CPT | Mod: CPTII,S$GLB,, | Performed by: TRANSPLANT SURGERY

## 2023-10-25 PROCEDURE — 1111F DSCHRG MED/CURRENT MED MERGE: CPT | Mod: CPTII,S$GLB,, | Performed by: NURSE PRACTITIONER

## 2023-10-25 PROCEDURE — 3066F NEPHROPATHY DOC TX: CPT | Mod: CPTII,S$GLB,, | Performed by: NURSE PRACTITIONER

## 2023-10-25 PROCEDURE — 82570 ASSAY OF URINE CREATININE: CPT | Performed by: TRANSPLANT SURGERY

## 2023-10-25 PROCEDURE — 1160F RVW MEDS BY RX/DR IN RCRD: CPT | Mod: CPTII,S$GLB,, | Performed by: NURSE PRACTITIONER

## 2023-10-25 PROCEDURE — 89051 BODY FLUID CELL COUNT: CPT | Performed by: TRANSPLANT SURGERY

## 2023-10-25 PROCEDURE — 4010F PR ACE/ARB THEARPY RXD/TAKEN: ICD-10-PCS | Mod: CPTII,S$GLB,, | Performed by: TRANSPLANT SURGERY

## 2023-10-25 PROCEDURE — 99999 PR PBB SHADOW E&M-EST. PATIENT-LVL V: CPT | Mod: PBBFAC,,,

## 2023-10-25 PROCEDURE — 3077F SYST BP >= 140 MM HG: CPT | Mod: CPTII,S$GLB,, | Performed by: NURSE PRACTITIONER

## 2023-10-25 PROCEDURE — 4010F PR ACE/ARB THEARPY RXD/TAKEN: ICD-10-PCS | Mod: CPTII,S$GLB,, | Performed by: NURSE PRACTITIONER

## 2023-10-25 RX ORDER — TACROLIMUS 1 MG/1
CAPSULE ORAL
Qty: 480 CAPSULE | Refills: 11 | Status: SHIPPED | OUTPATIENT
Start: 2023-10-25 | End: 2023-10-27 | Stop reason: DRUGHIGH

## 2023-10-25 NOTE — Clinical Note
Was holding prograf since 10/23. I restarted today at 8/8 and will repeat level and renal panel Friday. He also saw Dr. Morales today for KEYA drain, still with output (sent for creatinine and WBC). Plans for US today and if looks OK he will remove Friday.

## 2023-10-25 NOTE — LETTER
October 25, 2023        Jannet Farias  [address] [place] [address]  Phone: [phone]  Fax: [phone]             [facility]- Transplant 1st Fl  [address]  [facility] [address]  Phone: [phone]   Patient: Heath Hernandez   MR Number: [id number]   YOB: 1960   Date of Visit: 10/25/2023       Dear Dr. Jannet Farias    Thank you for referring Heath Hernandez to me for evaluation. Attached you will find relevant portions of my assessment and plan of care.    If you have questions, please do not hesitate to call me. I look forward to following Heath Hernandez along with you.    Sincerely,    Annika Colon NP    Enclosure    If you would like to receive this communication electronically, please contact [email or web address] or [phone] to request [employer] access.    [employer] is a tool which provides read-only access to select patient information with whom you have a relationship. Its easy to use and provides real time access to review your patients record including encounter summaries, notes, results, and demographic information.    If you feel you have received this communication in error or would no longer like to receive these types of communications, please e-mail [email or web address]

## 2023-10-26 ENCOUNTER — PATIENT MESSAGE (OUTPATIENT)
Dept: TRANSPLANT | Facility: CLINIC | Age: 63
End: 2023-10-26
Payer: COMMERCIAL

## 2023-10-26 NOTE — PROGRESS NOTES
Transplant Surgery  Kidney Transplant Recipient Follow-up    Referring Physician: Jannet Farias  Current Nephrologist: Jannet Farias    Subjective:     Chief Complaint: Heath Hernandez is a 63 y.o. year old White male who is status post Kidney transplant performed on 10/12/2023.    ORGAN: LEFT KIDNEY   Disease Etiology: Diabetes Mellitus - Type II  Donor Type: Living   Donor CMV Status:    Donor HBcAB:    Donor HCV Status:      History of Present Illness: He reports  weight loss .  From a transplant perspective, he reports normal urination.  Heath is here for management of his immunosuppression medication.  Heath states that his immunosuppression is being well tolerated.  Hypertension is not present.    External provider notes reviewed: Yes    Review of Systems   Constitutional:  Negative for activity change and appetite change.   HENT:  Negative for congestion and tinnitus.    Eyes:  Negative for redness and visual disturbance.   Respiratory:  Negative for cough and shortness of breath.    Cardiovascular:  Negative for chest pain and palpitations.   Gastrointestinal:  Negative for abdominal distention and abdominal pain.   Endocrine: Negative for polydipsia and polyuria.   Genitourinary:  Negative for decreased urine volume and dysuria.   Musculoskeletal:  Negative for arthralgias and back pain.   Skin:  Negative for pallor and rash.   Allergic/Immunologic: Positive for immunocompromised state. Negative for environmental allergies.   Neurological:  Negative for tremors and headaches.   Hematological:  Negative for adenopathy. Does not bruise/bleed easily.   Psychiatric/Behavioral:  Negative for behavioral problems and confusion.        Objective:     Physical Exam  Constitutional:       General: He is not in acute distress.     Appearance: He is not diaphoretic.   HENT:      Head: Normocephalic and atraumatic.   Eyes:      Conjunctiva/sclera: Conjunctivae normal.      Pupils: Pupils are equal, round, and  reactive to light.   Cardiovascular:      Rate and Rhythm: Normal rate and regular rhythm.   Pulmonary:      Effort: Pulmonary effort is normal.      Breath sounds: Normal breath sounds.   Abdominal:      General: Bowel sounds are normal. There is no distension.      Palpations: Abdomen is soft.      Tenderness: There is no abdominal tenderness.       Musculoskeletal:         General: Normal range of motion.      Cervical back: Normal range of motion and neck supple.   Skin:     General: Skin is warm and dry.   Neurological:      Mental Status: He is alert and oriented to person, place, and time.   Psychiatric:         Behavior: Behavior normal.       Lab Results   Component Value Date    CREATININE 1.6 (H) 10/25/2023    BUN 25 (H) 10/25/2023     Lab Results   Component Value Date    WBC 6.87 10/25/2023    HGB 9.7 (L) 10/25/2023    HCT 30.1 (L) 10/25/2023    HCT 34 (L) 10/12/2023     10/25/2023     Lab Results   Component Value Date    TACROLIMUS 8.3 10/25/2023       Diagnostics:  The following labs have been reviewed: CBC  CMP  TACROLIMUS LEVEL  The following radiology images have been independently reviewed and interpreted: Renal US    Assessment and Plan:        S/P Kidney transplant.  Chronic immunosuppressive medications for rejection prophylaxis at target.  Plan:  managed by nephrologist .  Continue monitoring symptoms, labs and drug levels for drug-related toxicity and side effects.  Renal hypertension at target.  Drain: Continued high output (500mL daily) but no more leaking around drain, send drain studies (cell count, Cr) and get US    Additional testing to be completed according to the Kidney: Written Order Guideline for Kidney Transplant Follow-Up (KI-09)    Interpretation of tests and discussion of patient management involves all members of the multidisciplinary transplant team.  Patient advised that it is recommended that all transplant candidates, and their close contacts and household members  receive Covid vaccination.  Follow-up: Patient reminded to call with any health changes, since these can be early signs of significant complications.  Also, I advised the patient to be sure any new medications or changes of old medications are discussed with either a pharmacist, or physician knowledgeable with transplant to avoid rejection/drug toxicity related to significant drug interactions.    Cuauhtemoc Morales MD       [facility] Patient Status  Functional Status: 70% - Cares for self: unable to carry on normal activity or active work  Physical Capacity: No Limitations

## 2023-10-27 ENCOUNTER — PATIENT MESSAGE (OUTPATIENT)
Dept: ADMINISTRATIVE | Facility: OTHER | Age: 63
End: 2023-10-27
Payer: COMMERCIAL

## 2023-10-27 ENCOUNTER — LAB VISIT (OUTPATIENT)
Dept: LAB | Facility: HOSPITAL | Age: 63
End: 2023-10-27
Attending: INTERNAL MEDICINE
Payer: COMMERCIAL

## 2023-10-27 ENCOUNTER — PATIENT MESSAGE (OUTPATIENT)
Dept: TRANSPLANT | Facility: CLINIC | Age: 63
End: 2023-10-27
Payer: COMMERCIAL

## 2023-10-27 DIAGNOSIS — Z94.0 S/P KIDNEY TRANSPLANT: ICD-10-CM

## 2023-10-27 DIAGNOSIS — Z79.60 LONG-TERM USE OF IMMUNOSUPPRESSANT MEDICATION: ICD-10-CM

## 2023-10-27 DIAGNOSIS — Z94.0 KIDNEY REPLACED BY TRANSPLANT: ICD-10-CM

## 2023-10-27 LAB
ALBUMIN SERPL BCP-MCNC: 2.9 G/DL (ref 3.5–5.2)
ANION GAP SERPL CALC-SCNC: 8 MMOL/L (ref 8–16)
BASOPHILS # BLD AUTO: 0.04 K/UL (ref 0–0.2)
BASOPHILS NFR BLD: 0.6 % (ref 0–1.9)
BUN SERPL-MCNC: 26 MG/DL (ref 8–23)
CALCIUM SERPL-MCNC: 8.5 MG/DL (ref 8.7–10.5)
CHLORIDE SERPL-SCNC: 109 MMOL/L (ref 95–110)
CO2 SERPL-SCNC: 22 MMOL/L (ref 23–29)
CREAT SERPL-MCNC: 1.7 MG/DL (ref 0.5–1.4)
DIFFERENTIAL METHOD: ABNORMAL
EOSINOPHIL # BLD AUTO: 0.1 K/UL (ref 0–0.5)
EOSINOPHIL NFR BLD: 1.3 % (ref 0–8)
ERYTHROCYTE [DISTWIDTH] IN BLOOD BY AUTOMATED COUNT: 14.8 % (ref 11.5–14.5)
EST. GFR  (NO RACE VARIABLE): 45.3 ML/MIN/1.73 M^2
GLUCOSE SERPL-MCNC: 123 MG/DL (ref 70–110)
HCT VFR BLD AUTO: 30 % (ref 40–54)
HGB BLD-MCNC: 9.8 G/DL (ref 14–18)
IMM GRANULOCYTES # BLD AUTO: 0.07 K/UL (ref 0–0.04)
IMM GRANULOCYTES NFR BLD AUTO: 1 % (ref 0–0.5)
LYMPHOCYTES # BLD AUTO: 0.9 K/UL (ref 1–4.8)
LYMPHOCYTES NFR BLD: 12.4 % (ref 18–48)
MCH RBC QN AUTO: 29.2 PG (ref 27–31)
MCHC RBC AUTO-ENTMCNC: 32.7 G/DL (ref 32–36)
MCV RBC AUTO: 89 FL (ref 82–98)
MONOCYTES # BLD AUTO: 0.4 K/UL (ref 0.3–1)
MONOCYTES NFR BLD: 6.3 % (ref 4–15)
NEUTROPHILS # BLD AUTO: 5.4 K/UL (ref 1.8–7.7)
NEUTROPHILS NFR BLD: 78.4 % (ref 38–73)
NRBC BLD-RTO: 0 /100 WBC
PHOSPHATE SERPL-MCNC: 2.3 MG/DL (ref 2.7–4.5)
PLATELET # BLD AUTO: 312 K/UL (ref 150–450)
PMV BLD AUTO: 10 FL (ref 9.2–12.9)
POTASSIUM SERPL-SCNC: 5 MMOL/L (ref 3.5–5.1)
RBC # BLD AUTO: 3.36 M/UL (ref 4.6–6.2)
SODIUM SERPL-SCNC: 139 MMOL/L (ref 136–145)
TACROLIMUS BLD-MCNC: 13.4 NG/ML (ref 5–15)
WBC # BLD AUTO: 6.86 K/UL (ref 3.9–12.7)

## 2023-10-27 PROCEDURE — 80197 ASSAY OF TACROLIMUS: CPT | Performed by: INTERNAL MEDICINE

## 2023-10-27 PROCEDURE — 80069 RENAL FUNCTION PANEL: CPT | Performed by: INTERNAL MEDICINE

## 2023-10-27 PROCEDURE — 36415 COLL VENOUS BLD VENIPUNCTURE: CPT | Performed by: INTERNAL MEDICINE

## 2023-10-27 PROCEDURE — 85025 COMPLETE CBC W/AUTO DIFF WBC: CPT | Performed by: INTERNAL MEDICINE

## 2023-10-27 RX ORDER — TACROLIMUS 1 MG/1
CAPSULE ORAL
Qty: 480 CAPSULE | Refills: 11 | Status: SHIPPED | OUTPATIENT
Start: 2023-10-27 | End: 2023-10-30 | Stop reason: DRUGHIGH

## 2023-10-27 NOTE — TELEPHONE ENCOUNTER
Message sent.     Labs Monday     ----- Message from Williams Vasquez MD sent at 10/27/2023 11:24 AM CDT -----  Lower prograf to 6 mg PO bid if the level is a true trough level and repeat labs next week

## 2023-10-29 ENCOUNTER — PATIENT MESSAGE (OUTPATIENT)
Dept: ENDOCRINOLOGY | Facility: CLINIC | Age: 63
End: 2023-10-29
Payer: COMMERCIAL

## 2023-10-30 ENCOUNTER — LAB VISIT (OUTPATIENT)
Dept: LAB | Facility: HOSPITAL | Age: 63
End: 2023-10-30
Attending: INTERNAL MEDICINE
Payer: COMMERCIAL

## 2023-10-30 ENCOUNTER — PATIENT MESSAGE (OUTPATIENT)
Dept: TRANSPLANT | Facility: CLINIC | Age: 63
End: 2023-10-30
Payer: COMMERCIAL

## 2023-10-30 DIAGNOSIS — Z94.0 KIDNEY REPLACED BY TRANSPLANT: ICD-10-CM

## 2023-10-30 DIAGNOSIS — Z79.60 LONG-TERM USE OF IMMUNOSUPPRESSANT MEDICATION: ICD-10-CM

## 2023-10-30 DIAGNOSIS — Z94.0 S/P KIDNEY TRANSPLANT: ICD-10-CM

## 2023-10-30 LAB
ALBUMIN SERPL BCP-MCNC: 2.9 G/DL (ref 3.5–5.2)
ANION GAP SERPL CALC-SCNC: 8 MMOL/L (ref 8–16)
BASOPHILS # BLD AUTO: 0.05 K/UL (ref 0–0.2)
BASOPHILS NFR BLD: 0.9 % (ref 0–1.9)
BUN SERPL-MCNC: 23 MG/DL (ref 8–23)
CALCIUM SERPL-MCNC: 8.7 MG/DL (ref 8.7–10.5)
CHLORIDE SERPL-SCNC: 108 MMOL/L (ref 95–110)
CO2 SERPL-SCNC: 23 MMOL/L (ref 23–29)
CREAT SERPL-MCNC: 1.6 MG/DL (ref 0.5–1.4)
DIFFERENTIAL METHOD: ABNORMAL
EOSINOPHIL # BLD AUTO: 0.1 K/UL (ref 0–0.5)
EOSINOPHIL NFR BLD: 1.5 % (ref 0–8)
ERYTHROCYTE [DISTWIDTH] IN BLOOD BY AUTOMATED COUNT: 14.5 % (ref 11.5–14.5)
EST. GFR  (NO RACE VARIABLE): 48.7 ML/MIN/1.73 M^2
GLUCOSE SERPL-MCNC: 136 MG/DL (ref 70–110)
HCT VFR BLD AUTO: 32.5 % (ref 40–54)
HGB BLD-MCNC: 10.5 G/DL (ref 14–18)
IMM GRANULOCYTES # BLD AUTO: 0.09 K/UL (ref 0–0.04)
IMM GRANULOCYTES NFR BLD AUTO: 1.7 % (ref 0–0.5)
LYMPHOCYTES # BLD AUTO: 1 K/UL (ref 1–4.8)
LYMPHOCYTES NFR BLD: 17.4 % (ref 18–48)
MAGNESIUM SERPL-MCNC: 1.5 MG/DL (ref 1.6–2.6)
MCH RBC QN AUTO: 29.6 PG (ref 27–31)
MCHC RBC AUTO-ENTMCNC: 32.3 G/DL (ref 32–36)
MCV RBC AUTO: 92 FL (ref 82–98)
MONOCYTES # BLD AUTO: 0.3 K/UL (ref 0.3–1)
MONOCYTES NFR BLD: 5.7 % (ref 4–15)
NEUTROPHILS # BLD AUTO: 4 K/UL (ref 1.8–7.7)
NEUTROPHILS NFR BLD: 72.8 % (ref 38–73)
NRBC BLD-RTO: 0 /100 WBC
PHOSPHATE SERPL-MCNC: 2.4 MG/DL (ref 2.7–4.5)
PLATELET # BLD AUTO: 328 K/UL (ref 150–450)
PMV BLD AUTO: 10.6 FL (ref 9.2–12.9)
POTASSIUM SERPL-SCNC: 5.6 MMOL/L (ref 3.5–5.1)
RBC # BLD AUTO: 3.55 M/UL (ref 4.6–6.2)
SODIUM SERPL-SCNC: 139 MMOL/L (ref 136–145)
TACROLIMUS BLD-MCNC: 13.4 NG/ML (ref 5–15)
WBC # BLD AUTO: 5.45 K/UL (ref 3.9–12.7)

## 2023-10-30 PROCEDURE — 85025 COMPLETE CBC W/AUTO DIFF WBC: CPT | Performed by: INTERNAL MEDICINE

## 2023-10-30 PROCEDURE — 80197 ASSAY OF TACROLIMUS: CPT | Performed by: INTERNAL MEDICINE

## 2023-10-30 PROCEDURE — 83735 ASSAY OF MAGNESIUM: CPT | Performed by: INTERNAL MEDICINE

## 2023-10-30 PROCEDURE — 80069 RENAL FUNCTION PANEL: CPT | Performed by: INTERNAL MEDICINE

## 2023-10-30 PROCEDURE — 36415 COLL VENOUS BLD VENIPUNCTURE: CPT | Performed by: INTERNAL MEDICINE

## 2023-10-30 RX ORDER — SODIUM POLYSTYRENE SULFONATE 4.1 MEQ/G
30 POWDER, FOR SUSPENSION ORAL; RECTAL DAILY
Qty: 90 G | Refills: 0 | Status: SHIPPED | OUTPATIENT
Start: 2023-10-30 | End: 2023-11-08

## 2023-10-30 RX ORDER — ATOVAQUONE 750 MG/5ML
1500 SUSPENSION ORAL DAILY
Qty: 300 ML | Refills: 5 | Status: SHIPPED | OUTPATIENT
Start: 2023-10-30 | End: 2024-04-13

## 2023-10-30 RX ORDER — TACROLIMUS 1 MG/1
CAPSULE ORAL
Qty: 480 CAPSULE | Refills: 11 | Status: SHIPPED | OUTPATIENT
Start: 2023-10-31 | End: 2023-11-06 | Stop reason: DRUGHIGH

## 2023-10-30 NOTE — PROGRESS NOTES
Low k diet. More water intake.  Stop bactrim and start mepron 1500 mg daily.  Kayexalate 30 gram daily X 3 days. Check K on Wednesday  Hold tac if it is a true level and resume at 5 mg BID tomorrow night.

## 2023-10-30 NOTE — TELEPHONE ENCOUNTER
Message sent.       ----- Message from Gloria Turner MD sent at 10/30/2023 11:35 AM CDT -----  Low k diet. More water intake.  Stop bactrim and start mepron 1500 mg daily.  Kayexalate 30 gram daily X 3 days. Check K on Wednesday  Hold tac if it is a true level and resume at 5 mg BID tomorrow night.

## 2023-11-01 ENCOUNTER — PATIENT MESSAGE (OUTPATIENT)
Dept: TRANSPLANT | Facility: CLINIC | Age: 63
End: 2023-11-01

## 2023-11-01 ENCOUNTER — OFFICE VISIT (OUTPATIENT)
Dept: TRANSPLANT | Facility: CLINIC | Age: 63
End: 2023-11-01
Payer: COMMERCIAL

## 2023-11-01 VITALS
SYSTOLIC BLOOD PRESSURE: 150 MMHG | OXYGEN SATURATION: 99 % | WEIGHT: 164.69 LBS | RESPIRATION RATE: 18 BRPM | HEART RATE: 102 BPM | HEIGHT: 70 IN | DIASTOLIC BLOOD PRESSURE: 75 MMHG | BODY MASS INDEX: 23.58 KG/M2

## 2023-11-01 DIAGNOSIS — Z79.60 LONG-TERM USE OF IMMUNOSUPPRESSANT MEDICATION: ICD-10-CM

## 2023-11-01 DIAGNOSIS — Z94.0 S/P KIDNEY TRANSPLANT: Primary | ICD-10-CM

## 2023-11-01 DIAGNOSIS — Z51.81 ENCOUNTER FOR THERAPEUTIC DRUG MONITORING: ICD-10-CM

## 2023-11-01 PROCEDURE — 1159F PR MEDICATION LIST DOCUMENTED IN MEDICAL RECORD: ICD-10-PCS | Mod: CPTII,S$GLB,, | Performed by: TRANSPLANT SURGERY

## 2023-11-01 PROCEDURE — 3044F PR MOST RECENT HEMOGLOBIN A1C LEVEL <7.0%: ICD-10-PCS | Mod: CPTII,S$GLB,, | Performed by: TRANSPLANT SURGERY

## 2023-11-01 PROCEDURE — 1111F DSCHRG MED/CURRENT MED MERGE: CPT | Mod: CPTII,S$GLB,, | Performed by: TRANSPLANT SURGERY

## 2023-11-01 PROCEDURE — 99999 PR PBB SHADOW E&M-EST. PATIENT-LVL IV: CPT | Mod: PBBFAC,,, | Performed by: TRANSPLANT SURGERY

## 2023-11-01 PROCEDURE — 3044F HG A1C LEVEL LT 7.0%: CPT | Mod: CPTII,S$GLB,, | Performed by: TRANSPLANT SURGERY

## 2023-11-01 PROCEDURE — 3066F PR DOCUMENTATION OF TREATMENT FOR NEPHROPATHY: ICD-10-PCS | Mod: CPTII,S$GLB,, | Performed by: TRANSPLANT SURGERY

## 2023-11-01 PROCEDURE — 4010F ACE/ARB THERAPY RXD/TAKEN: CPT | Mod: CPTII,S$GLB,, | Performed by: TRANSPLANT SURGERY

## 2023-11-01 PROCEDURE — 99999 PR PBB SHADOW E&M-EST. PATIENT-LVL IV: ICD-10-PCS | Mod: PBBFAC,,, | Performed by: TRANSPLANT SURGERY

## 2023-11-01 PROCEDURE — 3078F DIAST BP <80 MM HG: CPT | Mod: CPTII,S$GLB,, | Performed by: TRANSPLANT SURGERY

## 2023-11-01 PROCEDURE — 3077F SYST BP >= 140 MM HG: CPT | Mod: CPTII,S$GLB,, | Performed by: TRANSPLANT SURGERY

## 2023-11-01 PROCEDURE — 3008F PR BODY MASS INDEX (BMI) DOCUMENTED: ICD-10-PCS | Mod: CPTII,S$GLB,, | Performed by: TRANSPLANT SURGERY

## 2023-11-01 PROCEDURE — 4010F PR ACE/ARB THEARPY RXD/TAKEN: ICD-10-PCS | Mod: CPTII,S$GLB,, | Performed by: TRANSPLANT SURGERY

## 2023-11-01 PROCEDURE — 99214 OFFICE O/P EST MOD 30 MIN: CPT | Mod: 24,S$GLB,, | Performed by: TRANSPLANT SURGERY

## 2023-11-01 PROCEDURE — 3078F PR MOST RECENT DIASTOLIC BLOOD PRESSURE < 80 MM HG: ICD-10-PCS | Mod: CPTII,S$GLB,, | Performed by: TRANSPLANT SURGERY

## 2023-11-01 PROCEDURE — 3066F NEPHROPATHY DOC TX: CPT | Mod: CPTII,S$GLB,, | Performed by: TRANSPLANT SURGERY

## 2023-11-01 PROCEDURE — 3008F BODY MASS INDEX DOCD: CPT | Mod: CPTII,S$GLB,, | Performed by: TRANSPLANT SURGERY

## 2023-11-01 PROCEDURE — 3077F PR MOST RECENT SYSTOLIC BLOOD PRESSURE >= 140 MM HG: ICD-10-PCS | Mod: CPTII,S$GLB,, | Performed by: TRANSPLANT SURGERY

## 2023-11-01 PROCEDURE — 1159F MED LIST DOCD IN RCRD: CPT | Mod: CPTII,S$GLB,, | Performed by: TRANSPLANT SURGERY

## 2023-11-01 PROCEDURE — 99214 PR OFFICE/OUTPT VISIT, EST, LEVL IV, 30-39 MIN: ICD-10-PCS | Mod: 24,S$GLB,, | Performed by: TRANSPLANT SURGERY

## 2023-11-01 PROCEDURE — 1111F PR DISCHARGE MEDS RECONCILED W/ CURRENT OUTPATIENT MED LIST: ICD-10-PCS | Mod: CPTII,S$GLB,, | Performed by: TRANSPLANT SURGERY

## 2023-11-01 NOTE — PROGRESS NOTES
Transplant Surgery  Kidney Transplant Recipient Follow-up    Referring Physician: Jannet Farias  Current Nephrologist: Jannet Farias    Subjective:     Chief Complaint: Heath Hernandez is a 63 y.o. year old White male who is status post Kidney transplant performed on 10/12/2023. He had takeback for subcapsular hematoma, which resolved but he's been leaking from drain since then.      ORGAN: LEFT KIDNEY   Disease Etiology: Diabetes Mellitus - Type II  Donor Type: Living   Donor CMV Status:    Donor HBcAB:    Donor HCV Status:      History of Present Illness: He reports  draining from surgical drain; 250ml daily approximately; clear fluid .  From a transplant perspective, he reports normal urination.  Heath is here for management of his immunosuppression medication.  Heath states that his immunosuppression is being well tolerated.  Hypertension is not present.    External provider notes reviewed: Yes    Review of Systems    Objective:     Physical Exam  Constitutional:       Appearance: He is well-developed.   HENT:      Head: Normocephalic and atraumatic.   Eyes:      Pupils: Pupils are equal, round, and reactive to light.   Neck:      Vascular: No JVD.   Cardiovascular:      Rate and Rhythm: Normal rate and regular rhythm.      Heart sounds: Normal heart sounds.   Pulmonary:      Effort: Pulmonary effort is normal.      Breath sounds: Normal breath sounds. No stridor.   Abdominal:      General: There is no distension.      Palpations: Abdomen is soft.      Tenderness: There is no abdominal tenderness.       Musculoskeletal:         General: Normal range of motion.   Skin:     General: Skin is warm and dry.   Neurological:      Mental Status: He is alert and oriented to person, place, and time.   Psychiatric:         Behavior: Behavior normal.   Lab Results   Component Value Date    CREATININE 1.6 (H) 10/30/2023    BUN 23 10/30/2023     Lab Results   Component Value Date    WBC 5.45 10/30/2023    HGB 10.5  (L) 10/30/2023    HCT 32.5 (L) 10/30/2023    HCT 34 (L) 10/12/2023     10/30/2023     Lab Results   Component Value Date    TACROLIMUS 13.4 10/30/2023       Diagnostics:  The following labs have been reviewed: CBC  CMP  PT  TACROLIMUS LEVEL  The following radiology images have been independently reviewed and interpreted: Renal US    Assessment and Plan:        S/P Kidney transplant.  Low grade urine leak from the decapsulated kidney parenchyma - continue perc drainage  Chronic immunosuppressive medications for rejection prophylaxis at target.  Plan: no adjustment needed.  Continue monitoring symptoms, labs and drug levels for drug-related toxicity and side effects.  Renal hypertension at target.    Additional testing to be completed according to the Kidney: Written Order Guideline for Kidney Transplant Follow-Up (KI-09)    Interpretation of tests and discussion of patient management involves all members of the multidisciplinary transplant team.  Patient advised that it is recommended that all transplant candidates, and their close contacts and household members receive Covid vaccination.  Follow-up: Patient reminded to call with any health changes, since these can be early signs of significant complications.  Also, I advised the patient to be sure any new medications or changes of old medications are discussed with either a pharmacist, or physician knowledgeable with transplant to avoid rejection/drug toxicity related to significant drug interactions.    MD PERLITA Schrader Jr Patient Status  Functional Status: 80% - Normal activity with effort: some symptoms of disease  Physical Capacity: No Limitations

## 2023-11-01 NOTE — LETTER
November 1, 2023        Jannet Farias  [address] [place] [address]  Phone: [phone]  Fax: [phone]             [facility]- Transplant 1st Fl  [address]  [facility] [address]  Phone: [phone]   Patient: Heath Hernandez   MR Number: [id number]   YOB: 1960   Date of Visit: 11/1/2023       Dear Dr. Jannet Farias    Thank you for referring Heath Hernandez to me for evaluation. Attached you will find relevant portions of my assessment and plan of care.    If you have questions, please do not hesitate to call me. I look forward to following Heath Hernandez along with you.    Sincerely,    Mike Bernal Jr, MD    Enclosure    If you would like to receive this communication electronically, please contact [email or web address] or [phone] to request Appier Link access.    [employer]ier Link is a tool which provides read-only access to select patient information with whom you have a relationship. Its easy to use and provides real time access to review your patients record including encounter summaries, notes, results, and demographic information.    If you feel you have received this communication in error or would no longer like to receive these types of communications, please e-mail [email or web address]

## 2023-11-02 ENCOUNTER — LAB VISIT (OUTPATIENT)
Dept: LAB | Facility: HOSPITAL | Age: 63
End: 2023-11-02
Attending: INTERNAL MEDICINE
Payer: COMMERCIAL

## 2023-11-02 ENCOUNTER — PATIENT MESSAGE (OUTPATIENT)
Dept: TRANSPLANT | Facility: CLINIC | Age: 63
End: 2023-11-02
Payer: COMMERCIAL

## 2023-11-02 DIAGNOSIS — Z94.0 KIDNEY REPLACED BY TRANSPLANT: ICD-10-CM

## 2023-11-02 LAB
ALBUMIN SERPL BCP-MCNC: 2.9 G/DL (ref 3.5–5.2)
ANION GAP SERPL CALC-SCNC: 6 MMOL/L (ref 8–16)
BASOPHILS # BLD AUTO: 0.06 K/UL (ref 0–0.2)
BASOPHILS NFR BLD: 1.2 % (ref 0–1.9)
BUN SERPL-MCNC: 19 MG/DL (ref 8–23)
CALCIUM SERPL-MCNC: 8.6 MG/DL (ref 8.7–10.5)
CHLORIDE SERPL-SCNC: 106 MMOL/L (ref 95–110)
CO2 SERPL-SCNC: 29 MMOL/L (ref 23–29)
CREAT SERPL-MCNC: 1.5 MG/DL (ref 0.5–1.4)
DIFFERENTIAL METHOD: ABNORMAL
EOSINOPHIL # BLD AUTO: 0.1 K/UL (ref 0–0.5)
EOSINOPHIL NFR BLD: 2.4 % (ref 0–8)
ERYTHROCYTE [DISTWIDTH] IN BLOOD BY AUTOMATED COUNT: 14.6 % (ref 11.5–14.5)
EST. GFR  (NO RACE VARIABLE): 52.6 ML/MIN/1.73 M^2
GLUCOSE SERPL-MCNC: 134 MG/DL (ref 70–110)
HCT VFR BLD AUTO: 32 % (ref 40–54)
HGB BLD-MCNC: 10.2 G/DL (ref 14–18)
IMM GRANULOCYTES # BLD AUTO: 0.1 K/UL (ref 0–0.04)
IMM GRANULOCYTES NFR BLD AUTO: 2 % (ref 0–0.5)
LYMPHOCYTES # BLD AUTO: 1.1 K/UL (ref 1–4.8)
LYMPHOCYTES NFR BLD: 20.8 % (ref 18–48)
MAGNESIUM SERPL-MCNC: 1.5 MG/DL (ref 1.6–2.6)
MCH RBC QN AUTO: 29.4 PG (ref 27–31)
MCHC RBC AUTO-ENTMCNC: 31.9 G/DL (ref 32–36)
MCV RBC AUTO: 92 FL (ref 82–98)
MONOCYTES # BLD AUTO: 0.2 K/UL (ref 0.3–1)
MONOCYTES NFR BLD: 4.6 % (ref 4–15)
NEUTROPHILS # BLD AUTO: 3.5 K/UL (ref 1.8–7.7)
NEUTROPHILS NFR BLD: 69 % (ref 38–73)
NRBC BLD-RTO: 0 /100 WBC
PHOSPHATE SERPL-MCNC: 2.4 MG/DL (ref 2.7–4.5)
PLATELET # BLD AUTO: 293 K/UL (ref 150–450)
PMV BLD AUTO: 10.7 FL (ref 9.2–12.9)
POTASSIUM SERPL-SCNC: 4.3 MMOL/L (ref 3.5–5.1)
RBC # BLD AUTO: 3.47 M/UL (ref 4.6–6.2)
SODIUM SERPL-SCNC: 141 MMOL/L (ref 136–145)
TACROLIMUS BLD-MCNC: 8.9 NG/ML (ref 5–15)
WBC # BLD AUTO: 5.04 K/UL (ref 3.9–12.7)

## 2023-11-02 PROCEDURE — 83735 ASSAY OF MAGNESIUM: CPT | Performed by: INTERNAL MEDICINE

## 2023-11-02 PROCEDURE — 80069 RENAL FUNCTION PANEL: CPT | Performed by: INTERNAL MEDICINE

## 2023-11-02 PROCEDURE — 85025 COMPLETE CBC W/AUTO DIFF WBC: CPT | Performed by: INTERNAL MEDICINE

## 2023-11-02 PROCEDURE — 36415 COLL VENOUS BLD VENIPUNCTURE: CPT | Performed by: INTERNAL MEDICINE

## 2023-11-02 PROCEDURE — 80197 ASSAY OF TACROLIMUS: CPT | Performed by: INTERNAL MEDICINE

## 2023-11-02 NOTE — PHYSICIAN QUERY
PT Name: Heath Hernandez  MR #: [id number]     DOCUMENTATION CLARIFICATION     CDS/: Roberta Alston RN              Contact information: [email or web address]  This form is a permanent document in the medical record.     Query Date: November 2, 2023    By submitting this query, we are merely seeking further clarification of documentation.  Please utilize your independent clinical judgment when addressing the question(s) below.    The Medical Record contains the following       Indicators Supporting Clinical Findings Location in Medical Record   x Anemia, Thrombocytopenia, Neutropenia, Pancytopenia documented Anemia, chronic disease  - h/h decreased but stable  - monitor with CBC     Acute blood loss anemia  - 2/2 transplant and takeback  - expected post op  - 1 unit 10/13      Provider, please specify diagnosis associated with above clinical findings.     [   ]Pancytopenia due to other drug, please specify drug: ___  [ X]Pancytopenia, unspecified  [   ]Thrombocytopenia only  [   ]Other  (please specify): ________  [  ]Clinically Undetermined           PN 10/13       Transplant Kidney                  Physician Query Response       Dr. Morales  10/25/2023  9:04 AM   x H&H   10/14/23 06:13 10/15/23 07:03 10/16/23 05:10 10/17/23 05:21   Hemoglobin 7.4 (L) 8.0 (L) 6.8 (L) 7.3 (L)   Hematocrit 21.4 (L) 23.7 (L) 20.5 (L) 22.4 (L)    Lab   x WBC   10/14/23 06:13 10/15/23 07:03 10/16/23 05:10 10/17/23 05:21   WBC 6.85 3.89 (L) 3.39 (L) 4.43   RBC 2.41 (L) 2.64 (L) 2.27 (L) 2.50 (L)    Lab                     x Neutrophils/ Granulocytes/ ANC   10/14/23 06:13 10/15/23 07:03 10/16/23 05:10 10/17/23 05:21   Gran # (ANC) 6.6 3.7 3.1 3.9    Lab                   x Platelets   10/14/23 06:13 10/15/23 07:03 10/16/23 05:10 10/17/23 05:21   Platelet Count 110 (L) 103 (L) 98 (L) 112 (L)    Lab                         x Transfusion(s) Transfuse 1 unit PRBC Transfusion flowsheet  10/13   10/16     Treatments:       x Acute/  Chronic illness Status post renal transplant on 10/12/2023, with take back to the OR for drainage of a subcapsular hematoma on 10/13/2023     PN 10/14       Critical Care   x Other: Tentative  d/c tomorrow pending stability of blood counts.        Heparin injection every 8 hours                      tacrolimus capsule                                         PN 10/16       Transplant Kidney     MAR 10/12-10-17  MAR 10/13- 10/17       Pancytopenia  has been confirmed as a diagnosis via query signed 10/25/2023  9:04 AM .    Based on your medical judgment and in order to clinically support the documented diagnosis, please document the clinical indicators (signs, symptoms, & treatment) that were utilized to support this diagnosis:    [   ]  Signs, Symptoms, & Treatment: ________________________________________     [   ]  Above stated diagnosis is not confirmed and/or it has been ruled out     [   ]  Above stated diagnosis is not confirmed and/or it has been ruled out, other diagnosis ruled in (please          specify):_______________     [  X ]  Other clarification (please specify): __Anemia from blood loss, thrombocytopenia and leukopenia possibly related to immunosuppression and multi-factorial_________________________________           Form No. [id number]

## 2023-11-03 ENCOUNTER — PATIENT MESSAGE (OUTPATIENT)
Dept: ADMINISTRATIVE | Facility: OTHER | Age: 63
End: 2023-11-03
Payer: COMMERCIAL

## 2023-11-03 ENCOUNTER — PATIENT MESSAGE (OUTPATIENT)
Dept: TRANSPLANT | Facility: CLINIC | Age: 63
End: 2023-11-03
Payer: COMMERCIAL

## 2023-11-06 ENCOUNTER — PATIENT MESSAGE (OUTPATIENT)
Dept: TRANSPLANT | Facility: CLINIC | Age: 63
End: 2023-11-06
Payer: COMMERCIAL

## 2023-11-06 ENCOUNTER — LAB VISIT (OUTPATIENT)
Dept: LAB | Facility: HOSPITAL | Age: 63
End: 2023-11-06
Attending: INTERNAL MEDICINE
Payer: COMMERCIAL

## 2023-11-06 DIAGNOSIS — Z94.0 S/P KIDNEY TRANSPLANT: ICD-10-CM

## 2023-11-06 DIAGNOSIS — Z94.0 KIDNEY REPLACED BY TRANSPLANT: ICD-10-CM

## 2023-11-06 DIAGNOSIS — Z79.60 LONG-TERM USE OF IMMUNOSUPPRESSANT MEDICATION: ICD-10-CM

## 2023-11-06 LAB
ALBUMIN SERPL BCP-MCNC: 3.1 G/DL (ref 3.5–5.2)
ANION GAP SERPL CALC-SCNC: 5 MMOL/L (ref 8–16)
BASOPHILS # BLD AUTO: 0.06 K/UL (ref 0–0.2)
BASOPHILS NFR BLD: 1.1 % (ref 0–1.9)
BUN SERPL-MCNC: 15 MG/DL (ref 8–23)
CALCIUM SERPL-MCNC: 8.9 MG/DL (ref 8.7–10.5)
CHLORIDE SERPL-SCNC: 109 MMOL/L (ref 95–110)
CO2 SERPL-SCNC: 26 MMOL/L (ref 23–29)
CREAT SERPL-MCNC: 1.4 MG/DL (ref 0.5–1.4)
DIFFERENTIAL METHOD: ABNORMAL
EOSINOPHIL # BLD AUTO: 0.1 K/UL (ref 0–0.5)
EOSINOPHIL NFR BLD: 1.8 % (ref 0–8)
ERYTHROCYTE [DISTWIDTH] IN BLOOD BY AUTOMATED COUNT: 14.8 % (ref 11.5–14.5)
EST. GFR  (NO RACE VARIABLE): 57.2 ML/MIN/1.73 M^2
GLUCOSE SERPL-MCNC: 116 MG/DL (ref 70–110)
HCT VFR BLD AUTO: 32.4 % (ref 40–54)
HGB BLD-MCNC: 10.3 G/DL (ref 14–18)
IMM GRANULOCYTES # BLD AUTO: 0.08 K/UL (ref 0–0.04)
IMM GRANULOCYTES NFR BLD AUTO: 1.5 % (ref 0–0.5)
LYMPHOCYTES # BLD AUTO: 1 K/UL (ref 1–4.8)
LYMPHOCYTES NFR BLD: 18.1 % (ref 18–48)
MAGNESIUM SERPL-MCNC: 1.5 MG/DL (ref 1.6–2.6)
MCH RBC QN AUTO: 29.5 PG (ref 27–31)
MCHC RBC AUTO-ENTMCNC: 31.8 G/DL (ref 32–36)
MCV RBC AUTO: 93 FL (ref 82–98)
MONOCYTES # BLD AUTO: 0.2 K/UL (ref 0.3–1)
MONOCYTES NFR BLD: 4.2 % (ref 4–15)
NEUTROPHILS # BLD AUTO: 4 K/UL (ref 1.8–7.7)
NEUTROPHILS NFR BLD: 73.3 % (ref 38–73)
NRBC BLD-RTO: 0 /100 WBC
PHOSPHATE SERPL-MCNC: 2.4 MG/DL (ref 2.7–4.5)
PLATELET # BLD AUTO: 258 K/UL (ref 150–450)
PMV BLD AUTO: 11.5 FL (ref 9.2–12.9)
POTASSIUM SERPL-SCNC: 4.7 MMOL/L (ref 3.5–5.1)
RBC # BLD AUTO: 3.49 M/UL (ref 4.6–6.2)
SODIUM SERPL-SCNC: 140 MMOL/L (ref 136–145)
TACROLIMUS BLD-MCNC: 11.5 NG/ML (ref 5–15)
WBC # BLD AUTO: 5.42 K/UL (ref 3.9–12.7)

## 2023-11-06 PROCEDURE — 36415 COLL VENOUS BLD VENIPUNCTURE: CPT | Performed by: INTERNAL MEDICINE

## 2023-11-06 PROCEDURE — 80069 RENAL FUNCTION PANEL: CPT | Performed by: INTERNAL MEDICINE

## 2023-11-06 PROCEDURE — 83735 ASSAY OF MAGNESIUM: CPT | Performed by: INTERNAL MEDICINE

## 2023-11-06 PROCEDURE — 85025 COMPLETE CBC W/AUTO DIFF WBC: CPT | Performed by: INTERNAL MEDICINE

## 2023-11-06 PROCEDURE — 80197 ASSAY OF TACROLIMUS: CPT | Performed by: INTERNAL MEDICINE

## 2023-11-06 RX ORDER — TACROLIMUS 1 MG/1
CAPSULE ORAL
Qty: 480 CAPSULE | Refills: 11 | Status: SHIPPED | OUTPATIENT
Start: 2023-11-06 | End: 2023-11-16 | Stop reason: DRUGHIGH

## 2023-11-06 NOTE — TELEPHONE ENCOUNTER
Message sent.       ----- Message from Gloria Turner MD sent at 11/6/2023 10:05 AM CST -----  No worries, please change tac to 5/4 mg . Thanks   ----- Message -----  From: Tri Mujica RN  Sent: 11/6/2023   9:50 AM CST  To: Gloria Turner MD    5/5 I forgot to hit the bottom dose to 5 when adjusting it--sorry!!! I can update it.

## 2023-11-06 NOTE — PROGRESS NOTES
ENDOCRINOLOGY FOLLOW UP VISIT: 11/07/2023    Subjective:      Patient ID: Heath Hernandez is a 63 y.o. male.    Chief Complaint:  Diabetes    History of Present Illness  Heath Hernandez has a medical history significant for HTN, HLD, ESRD s/p kidney transplant and type 2 diabetes mellitus.  He lives in [place], [place] but is in town after his recent kidney transplant at [facility].  He was evaluated by the inpatient endocrine service due to hyperglycemia following transplant and had adjustments to insulin regimen and now follows up for adjustments.      Regarding his recent transplant he has a history of CKD for 30 years or so.  Unclear why he developed ESRD, one concern was for IgA nephropathy, but kidney function drastically worsened around 3 years ago.  He had to go on peritoneal dialysis around 1 year prior to his transplant.  Transplant performed on 10/12/2023 with his niece donating a kidney to him.           Regarding diabetes:    Initially Diagnosed with T2D:  Borderline BG for 30 years but then around 2018 he had DKA and was officially diagnosed with DM.      Insulin was started around 2018 when he had DKA with an initial A1c at that time of 18%.  Over the years he had some weight loss and ultimately was able to come off of insulin a few months prior to his transplant.       Current symptoms/problems:    ESRD s/p Kidney tx, on anti-rejection medicines and also daily steroid (prednisone 5 mg daily).     Denies any polyuria, polydipsia, nocturia, nausea vomiting, abdominal discomfort, numbness/tingling, visual change, weight change     Known diabetic complications: None - ESRD not from DM  Cardiovascular risk factors: advanced age (older than 55 for men, 65 for women), diabetes mellitus, dyslipidemia, and hypertension    Current diabetic medications include:    1.   Semglee (glargine) 8 units daily   2.   Humalog 5 units TIDAC w/ Low dose correction (150/50)    Other medications tried:  Lantus    Diet: on average, 3  "meals per day    Exercise: walking    Glucose Trends:          Any episodes of hypoglycemia? no    Family Hx:     Father - Yes           Wt Readings from Last 10 Encounters:   11/07/23 75.6 kg (166 lb 10.7 oz)   11/01/23 74.7 kg (164 lb 10.9 oz)   10/25/23 76.1 kg (167 lb 12.3 oz)   10/25/23 76.1 kg (167 lb 12.3 oz)   10/20/23 79.5 kg (175 lb 4.3 oz)   10/18/23 81.1 kg (178 lb 12.7 oz)   10/18/23 81.1 kg (178 lb 12.7 oz)   10/16/23 83.3 kg (183 lb 12.1 oz)   09/26/23 81.9 kg (180 lb 8.9 oz)   09/26/23 81.9 kg (180 lb 8.9 oz)       Diabetes Management Status    Statin: Taking  ACE/ARB: Not taking      Screening or Prevention Patient's value   HgA1C Testing and Control Lab Results   Component Value Date    HGBA1C 5.1 10/12/2023    HGBA1C 6.6 (H) 10/06/2021      Lipid profile  : 09/26/2023   LDL control (goal LDL <70) Lab Results   Component Value Date    LDLCALC 54.8 (L) 09/26/2023      Nephropathy screening No results found for: "LABMICR"  Lab Results   Component Value Date    PROTEINUA Negative 11/06/2023      Blood pressure BP Readings from Last 3 Encounters:   11/07/23 110/72   11/01/23 (!) 150/75   10/25/23 (!) 145/68      Dilated retinal exam Most Recent Eye Exam Date: Not Found   Foot exam Most Recent Foot Exam Date: Not Found        ROS:   As above    Objective:     /72   Pulse 80   Wt 75.6 kg (166 lb 10.7 oz)   SpO2 100%   BMI 23.91 kg/m²   BP Readings from Last 3 Encounters:   11/07/23 110/72   11/01/23 (!) 150/75   10/25/23 (!) 145/68     Wt Readings from Last 1 Encounters:   11/07/23 1026 75.6 kg (166 lb 10.7 oz)     Body mass index is 23.91 kg/m².    Physical Exam  Vitals reviewed.   Constitutional:       Appearance: Normal appearance.   Eyes:      Extraocular Movements: Extraocular movements intact.   Cardiovascular:      Rate and Rhythm: Normal rate.   Pulmonary:      Effort: Pulmonary effort is normal.   Neurological:      General: No focal deficit present.      Mental Status: He is alert. "   Psychiatric:         Mood and Affect: Mood normal.         Behavior: Behavior normal.        Lab Review:   Lab Results   Component Value Date    HGBA1C 5.1 10/12/2023     Lab Results   Component Value Date    CHOL 105 (L) 09/26/2023    HDL 25 (L) 09/26/2023    LDLCALC 54.8 (L) 09/26/2023    TRIG 126 09/26/2023    CHOLHDL 23.8 09/26/2023     Lab Results   Component Value Date     11/06/2023    K 4.7 11/06/2023     11/06/2023    CO2 26 11/06/2023     (H) 11/06/2023    BUN 15 11/06/2023    CREATININE 1.4 11/06/2023    CALCIUM 8.9 11/06/2023    PROT 6.5 09/26/2023    ALBUMIN 3.1 (L) 11/06/2023    BILITOT 0.6 09/26/2023    ALKPHOS 57 09/26/2023    AST 17 09/26/2023    ALT 21 09/26/2023    ANIONGAP 5 (L) 11/06/2023    ESTGFRAFRICA 19.6 (A) 10/06/2021    EGFRNONAA 17.0 (A) 10/06/2021     Vit D, 25-Hydroxy   Date Value Ref Range Status   10/12/2023 27 (L) 30 - 96 ng/mL Final     Comment:     Vitamin D deficiency.........<10 ng/mL                              Vitamin D insufficiency......10-29 ng/mL       Vitamin D sufficiency........> or equal to 30 ng/mL  Vitamin D toxicity............>100 ng/mL         Assessment and Plan     Type 2 diabetes mellitus with renal complication  Reviewed glucose logs that were brought to the clinic (see media).  Recent adjustment in the past week for prandial dosing with overall better BG control now ranging from 120-200.  Compliant with insulin therapy.  Some continue rise in BG after most meals despite recent adjustment from 3 units to 5 units prandial dose.      Has never used a CGM but does have a compatible phone (iPhone).      Plan:  - Increase Humalog to 6 units TIDAC  - Continue Semglee 8 units daily  - Start Dexcom G7 CGM (sample given)    - He was encouraged to have his PCP in [place] set him up with a local endocrinologist in the future and to start working on that now so he can have a local resource for further medicine adjustments.  Can see us again virtually  in 3 months.      The patient requires a therapeutic CGM and is willing to use therapeutic CGM for the necessary frequent adjustments of insulin therapy. He is at risk for severe hypoglycemia and CGM would mitigate that risk. I have completed an in-person visit during the previous 6 months and will continue to have in-person visits every 6 months to assess adherence to their CGM regimen and diabetes treatment plan.    Dyslipidemia  Can continue on Lipitor 10 mg daily  Goal LDL is less than 70    Benign hypertension  Blood pressure stable today  Continue home anti-hypertensive regimen    Long-term use of immunosuppressant medication  Will remain on immunosuppressant therapy along with prednisone daily  This is known to worsen BG control and if stress steroids are needed then insulin needs will change    RTC in 3 months for virtual visit    [facility], 6th Floor  [address] [place], [address]    Office: [phone]  Fax: [phone]

## 2023-11-07 ENCOUNTER — OFFICE VISIT (OUTPATIENT)
Dept: ENDOCRINOLOGY | Facility: CLINIC | Age: 63
End: 2023-11-07
Payer: COMMERCIAL

## 2023-11-07 VITALS
DIASTOLIC BLOOD PRESSURE: 72 MMHG | BODY MASS INDEX: 23.91 KG/M2 | SYSTOLIC BLOOD PRESSURE: 110 MMHG | WEIGHT: 166.69 LBS | HEART RATE: 80 BPM | OXYGEN SATURATION: 100 %

## 2023-11-07 DIAGNOSIS — I10 BENIGN HYPERTENSION: ICD-10-CM

## 2023-11-07 DIAGNOSIS — E11.22 TYPE 2 DIABETES MELLITUS WITH CHRONIC KIDNEY DISEASE ON CHRONIC DIALYSIS, WITH LONG-TERM CURRENT USE OF INSULIN: Primary | ICD-10-CM

## 2023-11-07 DIAGNOSIS — E78.5 DYSLIPIDEMIA: ICD-10-CM

## 2023-11-07 DIAGNOSIS — Z99.2 TYPE 2 DIABETES MELLITUS WITH CHRONIC KIDNEY DISEASE ON CHRONIC DIALYSIS, WITH LONG-TERM CURRENT USE OF INSULIN: Primary | ICD-10-CM

## 2023-11-07 DIAGNOSIS — N18.6 TYPE 2 DIABETES MELLITUS WITH CHRONIC KIDNEY DISEASE ON CHRONIC DIALYSIS, WITH LONG-TERM CURRENT USE OF INSULIN: Primary | ICD-10-CM

## 2023-11-07 DIAGNOSIS — Z94.0 S/P KIDNEY TRANSPLANT: ICD-10-CM

## 2023-11-07 DIAGNOSIS — Z79.60 LONG-TERM USE OF IMMUNOSUPPRESSANT MEDICATION: ICD-10-CM

## 2023-11-07 DIAGNOSIS — Z79.4 TYPE 2 DIABETES MELLITUS WITH CHRONIC KIDNEY DISEASE ON CHRONIC DIALYSIS, WITH LONG-TERM CURRENT USE OF INSULIN: Primary | ICD-10-CM

## 2023-11-07 PROCEDURE — 3008F BODY MASS INDEX DOCD: CPT | Mod: CPTII,S$GLB,, | Performed by: STUDENT IN AN ORGANIZED HEALTH CARE EDUCATION/TRAINING PROGRAM

## 2023-11-07 PROCEDURE — 99214 PR OFFICE/OUTPT VISIT, EST, LEVL IV, 30-39 MIN: ICD-10-PCS | Mod: S$GLB,,, | Performed by: STUDENT IN AN ORGANIZED HEALTH CARE EDUCATION/TRAINING PROGRAM

## 2023-11-07 PROCEDURE — 3008F PR BODY MASS INDEX (BMI) DOCUMENTED: ICD-10-PCS | Mod: CPTII,S$GLB,, | Performed by: STUDENT IN AN ORGANIZED HEALTH CARE EDUCATION/TRAINING PROGRAM

## 2023-11-07 PROCEDURE — 1159F PR MEDICATION LIST DOCUMENTED IN MEDICAL RECORD: ICD-10-PCS | Mod: CPTII,S$GLB,, | Performed by: STUDENT IN AN ORGANIZED HEALTH CARE EDUCATION/TRAINING PROGRAM

## 2023-11-07 PROCEDURE — 3078F DIAST BP <80 MM HG: CPT | Mod: CPTII,S$GLB,, | Performed by: STUDENT IN AN ORGANIZED HEALTH CARE EDUCATION/TRAINING PROGRAM

## 2023-11-07 PROCEDURE — 4010F ACE/ARB THERAPY RXD/TAKEN: CPT | Mod: CPTII,S$GLB,, | Performed by: STUDENT IN AN ORGANIZED HEALTH CARE EDUCATION/TRAINING PROGRAM

## 2023-11-07 PROCEDURE — 3044F HG A1C LEVEL LT 7.0%: CPT | Mod: CPTII,S$GLB,, | Performed by: STUDENT IN AN ORGANIZED HEALTH CARE EDUCATION/TRAINING PROGRAM

## 2023-11-07 PROCEDURE — 3066F NEPHROPATHY DOC TX: CPT | Mod: CPTII,S$GLB,, | Performed by: STUDENT IN AN ORGANIZED HEALTH CARE EDUCATION/TRAINING PROGRAM

## 2023-11-07 PROCEDURE — 3044F PR MOST RECENT HEMOGLOBIN A1C LEVEL <7.0%: ICD-10-PCS | Mod: CPTII,S$GLB,, | Performed by: STUDENT IN AN ORGANIZED HEALTH CARE EDUCATION/TRAINING PROGRAM

## 2023-11-07 PROCEDURE — 3066F PR DOCUMENTATION OF TREATMENT FOR NEPHROPATHY: ICD-10-PCS | Mod: CPTII,S$GLB,, | Performed by: STUDENT IN AN ORGANIZED HEALTH CARE EDUCATION/TRAINING PROGRAM

## 2023-11-07 PROCEDURE — 99214 OFFICE O/P EST MOD 30 MIN: CPT | Mod: S$GLB,,, | Performed by: STUDENT IN AN ORGANIZED HEALTH CARE EDUCATION/TRAINING PROGRAM

## 2023-11-07 PROCEDURE — 3074F SYST BP LT 130 MM HG: CPT | Mod: CPTII,S$GLB,, | Performed by: STUDENT IN AN ORGANIZED HEALTH CARE EDUCATION/TRAINING PROGRAM

## 2023-11-07 PROCEDURE — 99999 PR PBB SHADOW E&M-EST. PATIENT-LVL V: ICD-10-PCS | Mod: PBBFAC,,, | Performed by: STUDENT IN AN ORGANIZED HEALTH CARE EDUCATION/TRAINING PROGRAM

## 2023-11-07 PROCEDURE — 99999 PR PBB SHADOW E&M-EST. PATIENT-LVL V: CPT | Mod: PBBFAC,,, | Performed by: STUDENT IN AN ORGANIZED HEALTH CARE EDUCATION/TRAINING PROGRAM

## 2023-11-07 PROCEDURE — 3078F PR MOST RECENT DIASTOLIC BLOOD PRESSURE < 80 MM HG: ICD-10-PCS | Mod: CPTII,S$GLB,, | Performed by: STUDENT IN AN ORGANIZED HEALTH CARE EDUCATION/TRAINING PROGRAM

## 2023-11-07 PROCEDURE — 4010F PR ACE/ARB THEARPY RXD/TAKEN: ICD-10-PCS | Mod: CPTII,S$GLB,, | Performed by: STUDENT IN AN ORGANIZED HEALTH CARE EDUCATION/TRAINING PROGRAM

## 2023-11-07 PROCEDURE — 1111F DSCHRG MED/CURRENT MED MERGE: CPT | Mod: CPTII,S$GLB,, | Performed by: STUDENT IN AN ORGANIZED HEALTH CARE EDUCATION/TRAINING PROGRAM

## 2023-11-07 PROCEDURE — 1159F MED LIST DOCD IN RCRD: CPT | Mod: CPTII,S$GLB,, | Performed by: STUDENT IN AN ORGANIZED HEALTH CARE EDUCATION/TRAINING PROGRAM

## 2023-11-07 PROCEDURE — 3074F PR MOST RECENT SYSTOLIC BLOOD PRESSURE < 130 MM HG: ICD-10-PCS | Mod: CPTII,S$GLB,, | Performed by: STUDENT IN AN ORGANIZED HEALTH CARE EDUCATION/TRAINING PROGRAM

## 2023-11-07 PROCEDURE — 1111F PR DISCHARGE MEDS RECONCILED W/ CURRENT OUTPATIENT MED LIST: ICD-10-PCS | Mod: CPTII,S$GLB,, | Performed by: STUDENT IN AN ORGANIZED HEALTH CARE EDUCATION/TRAINING PROGRAM

## 2023-11-07 RX ORDER — INSULIN LISPRO 100 [IU]/ML
6 INJECTION, SOLUTION INTRAVENOUS; SUBCUTANEOUS
Qty: 15 ML | Refills: 5
Start: 2023-11-07 | End: 2024-01-04 | Stop reason: SDUPTHER

## 2023-11-07 RX ORDER — BLOOD-GLUCOSE SENSOR
1 EACH MISCELLANEOUS
Qty: 3 EACH | Refills: 11 | Status: SHIPPED | OUTPATIENT
Start: 2023-11-07 | End: 2024-11-06

## 2023-11-07 NOTE — ASSESSMENT & PLAN NOTE
Reviewed glucose logs that were brought to the clinic (see media).  Recent adjustment in the past week for prandial dosing with overall better BG control now ranging from 120-200.  Compliant with insulin therapy.  Some continue rise in BG after most meals despite recent adjustment from 3 units to 5 units prandial dose.      Has never used a CGM but does have a compatible phone (iPhone).      Plan:  - Increase Humalog to 6 units TIDAC  - Continue Semglee 8 units daily  - Start Dexcom G7 CGM (sample given)    - He was encouraged to have his PCP in [place] set him up with a local endocrinologist in the future and to start working on that now so he can have a local resource for further medicine adjustments.  Can see us again virtually in 3 months.      The patient requires a therapeutic CGM and is willing to use therapeutic CGM for the necessary frequent adjustments of insulin therapy. He is at risk for severe hypoglycemia and CGM would mitigate that risk. I have completed an in-person visit during the previous 6 months and will continue to have in-person visits every 6 months to assess adherence to their CGM regimen and diabetes treatment plan.

## 2023-11-07 NOTE — PATIENT INSTRUCTIONS
"We will have you adjust your mealtime Humalog dose up to 6 units with each meal.  Give this injection 5 minutes before you eat.      We will keep you on the same dose of Semglee for now (8 units)    We will be send a Dexcom G7 prescription to the pharmacy which will be a wearable blood sugar device.  Below are instructions for how to create a share code with us.        - To share Dexcom data with me you will need to download the Dexcom Clarity jennifer on your phone from the jennifer store (this is a separate jennifer than the dexcom jennifer that allows you to see your blood sugars)    - In the clarity jennifer you can generate a share code using the following instructions:    1.  Tap Share    2.  Tap Generate Code    3.  Tap "1 Year" for the length of time the code can be used    4.  Tap Generate Code    5.  Send a Amicus message to your doctor with the share code     Thank you for sending us this share code and we look for to seeing you again in the future.      "

## 2023-11-07 NOTE — PROGRESS NOTES
Kidney Post-Transplant Assessment    Referring Physician: Jannet Farias  Current Nephrologist: Jannet Farias    ORGAN: LEFT KIDNEY  Donor Type: living  PHS Increased Risk: no  Cold Ischemia: 44 mins  Induction Medications: thymoglobulin    Subjective:     CC:  Reassessment of renal allograft function and management of chronic immunosuppression.    HPI:  Mr. Hernandez is a 63 y.o. year old White male with history of CKD secondary to IgA nephropathy and DM2 who received a living kidney transplant on 10/12/23 (Thymo induction, CMV ++).  His most recent creatinine is 1.4. He takes mycophenolate mofetil, prednisone, and tacrolimus for maintenance immunosuppression. His post transplant course has been complicated by perinephric hematoma requiring surgical evacuation (POD1).    Had ureteral stent removed this morning, tolerated well.    Had surgery follow up 11/1, still with low grade urine leak from the decapsulated kidney parenchyma - continue perc drainage. Drain output continues to decrease, about 100 cc/day. Has surgery follow up visit 11/22.    Had endocrinology visit 11/7 for insulin adjustments. Did have blood sugar in the 60s last night and felt poorly.    Drinking about a gallon of water daily, no problems with urination. BP at goal, no peripheral edema. Very happy to be going home tomorrow after lab work.    Current Outpatient Medications   Medication Sig Dispense Refill    atorvastatin (LIPITOR) 10 MG tablet Take 1 tablet (10 mg total) by mouth once daily. 30 tablet 11    atovaquone (MEPRON) 750 mg/5 mL Susp oral liquid Take 10 mLs (1,500 mg total) by mouth once daily. 300 mL 5    bisacodyL (DULCOLAX) 5 mg EC tablet Take 1 tablet (5 mg total) by mouth daily as needed for Constipation. 30 tablet 0    calcitRIOL (ROCALTROL) 0.5 MCG Cap Take 1 capsule (0.5 mcg total) by mouth once daily. 30 capsule 11    carvediloL (COREG) 12.5 MG tablet Take 1 tablet (12.5 mg total) by mouth 2 (two) times daily. HOLD SBP  "<130, HR <60 60 tablet 11    clonazePAM (KLONOPIN) 1 MG tablet Take 1 mg by mouth nightly as needed.      DEXCOM G7 SENSOR Lady 1 Device by Misc.(Non-Drug; Combo Route) route every 10 days. 3 each 11    docusate sodium (COLACE) 100 MG capsule Take 1 capsule (100 mg total) by mouth 2 (two) times daily as needed for Constipation. 30 capsule 0    famotidine (PEPCID) 20 MG tablet Take 1 tablet (20 mg total) by mouth every evening. 30 tablet 0    insulin glargine-yfgn (SEMGLEE,INSULIN GLARG-YFGN,PEN) 100 unit/mL (3 mL) InPn Inject 8 Units into the skin once daily. 3 mL 11    insulin lispro (HUMALOG KWIKPEN INSULIN) 100 unit/mL pen Inject 6 Units into the skin 3 (three) times daily with meals. PLUS CORRECTION SCALE (MAX DAILY DOSE 30 UNITS) 15 mL 5    k phos di & mono-sod phos mono (PHOSPHA 250 NEUTRAL) 250 mg Tab Take 2 tablets by mouth 2 (two) times a day. 60 tablet 3    ketoconazole (NIZORAL) 200 mg Tab Take ½ tablet (100 mg total) by mouth once daily. 30 tablet 11    magnesium oxide (MAG-OX) 400 mg (241.3 mg magnesium) tablet Take 2 tablets (800 mg total) by mouth once daily. 60 tablet 11    multivitamin Tab Take 1 tablet by mouth once daily. 30 tablet 11    mycophenolate (CELLCEPT) 250 mg Cap Take 4 capsules (1,000 mg total) by mouth 2 (two) times daily. 240 capsule 11    NIFEdipine (PROCARDIA-XL) 30 MG (OSM) 24 hr tablet Take 1 tablet (30 mg total) by mouth once daily. HOLD SBP <140 30 tablet 11    oxyCODONE (ROXICODONE) 5 MG immediate release tablet Take 1 tablet (5 mg total) by mouth every 8 (eight) hours as needed for Pain. 15 tablet 0    pen needle, diabetic 32 gauge x 5/32" Ndle Use 1 pen needle to inject insulin 4 (four) times daily. 100 each 11    predniSONE (DELTASONE) 5 MG tablet Take by mouth daily: 10/15 to 10/21 20 mg, 10/22 to 10/28 15 mg, 10/29 to 11/4 10 mg, 11/5/23 take 5mg daily indefinitely. DO NOT STOP 70 tablet 11    sodium bicarbonate 650 MG tablet Take 1 tablet (650 mg total) by mouth 2 (two) " "times daily. 60 tablet 2    tacrolimus (PROGRAF) 1 MG Cap Take 5 capsules (5 mg total) by mouth every morning AND 4 capsules (4 mg total) every evening. 480 capsule 11    valGANciclovir (VALCYTE) 450 mg Tab Take 1 tablet (450 mg total) by mouth once daily. STOP: 1/13/24 30 tablet 2     No current facility-administered medications for this visit.       Past Medical History:   Diagnosis Date    Anxiety     Cancer     Skin Cancer    Diabetes mellitus     Diabetes mellitus, type 2     Disorder of kidney and ureter     Hyperlipidemia     Hypertension      Review of Systems   Constitutional:  Positive for fatigue. Negative for activity change, appetite change and fever.   HENT:  Negative for congestion, mouth sores and sore throat.    Eyes:  Negative for visual disturbance.   Respiratory:  Negative for cough, chest tightness and shortness of breath.    Cardiovascular:  Negative for chest pain, palpitations and leg swelling.   Gastrointestinal:  Negative for abdominal distention, abdominal pain, constipation, diarrhea and nausea.   Genitourinary:  Negative for difficulty urinating, frequency and hematuria.   Musculoskeletal:  Negative for arthralgias and gait problem.   Skin:  Positive for wound.   Allergic/Immunologic: Positive for immunocompromised state. Negative for environmental allergies and food allergies.   Neurological:  Negative for dizziness, weakness and numbness.   Psychiatric/Behavioral:  Negative for sleep disturbance. The patient is not nervous/anxious.        Objective:   Blood pressure 129/68, pulse 88, temperature 97.3 °F (36.3 °C), temperature source Temporal, resp. rate 16, height 5' 10" (1.778 m), weight 75.4 kg (166 lb 3.6 oz), SpO2 100 %.body mass index is 23.85 kg/m².    Physical Exam  Vitals and nursing note reviewed.   Constitutional:       Appearance: Normal appearance.   HENT:      Head: Normocephalic.   Cardiovascular:      Rate and Rhythm: Normal rate and regular rhythm.      Heart sounds: " Normal heart sounds.   Pulmonary:      Effort: Pulmonary effort is normal.      Breath sounds: Normal breath sounds.   Abdominal:      General: Bowel sounds are normal. There is no distension.      Palpations: Abdomen is soft.      Tenderness: There is no abdominal tenderness.      Comments: KEYA drain   Musculoskeletal:         General: Normal range of motion.   Skin:     General: Skin is warm and dry.   Neurological:      General: No focal deficit present.      Mental Status: He is alert.   Psychiatric:         Behavior: Behavior normal.         Labs:  Lab Results   Component Value Date    WBC 5.42 11/06/2023    HGB 10.3 (L) 11/06/2023    HCT 32.4 (L) 11/06/2023     11/06/2023    K 4.7 11/06/2023     11/06/2023    CO2 26 11/06/2023    BUN 15 11/06/2023    CREATININE 1.4 11/06/2023    EGFRNORACEVR 57.2 (A) 11/06/2023    CALCIUM 8.9 11/06/2023    PHOS 2.4 (L) 11/06/2023    MG 1.5 (L) 11/06/2023    ALBUMIN 3.1 (L) 11/06/2023    AST 17 09/26/2023    ALT 21 09/26/2023    UTPCR 0.29 (H) 11/06/2023    .0 (H) 10/12/2023    .0 (H) 10/12/2023    TACROLIMUS 11.5 11/06/2023       Labs were reviewed with the patient    Assessment:     1. S/P kidney transplant    2. Long-term use of immunosuppressant medication    3. IgA nephropathy    4. Renovascular hypertension    5. At risk for opportunistic infections      Plan:   Plans to return home tomorrow following lab work  Surgery follow up visit 11/22 for drain assessment  DC sodium bicarbonate   Increased valcyte dose for improving renal function      1. Immunosuppression: Prograf trough 11.5, which is SUPRAtherapeutic (target 8-10). Continue Prograf 5/4 (dose decreased 11/6), Ketoconazole 100 mg QD to augment prograf levels, MMF 1000 mg BID, and Prednisone 5 mg QD. Will continue to monitor for drug toxicities    2. Allograft Function: stable, continue good hydration  - history of CKD secondary to IgA nephropathy and DM2 s/p  living kidney transplant on  10/12/23 (Thymo induction, CMV ++).    - post transplant course has been complicated by perinephric hematoma requiring surgical evacuation (POD1).  Lab Results   Component Value Date    CREATININE 1.4 11/06/2023      Latest Reference Range & Units POD 25,  Kidney-Post 1 Year  11/06/23   eGFR >60 mL/min/1.73 m^2 57.2 !       3. Hypertension management: advise low salt diet and home BP monitoring    Coreg 12.5 mg BID, Nifedipine 30 mg QD      4. Metabolic Bone Disease/Secondary Hyperparathyroidism:  Calcitriol 0.5 mcg QD, MagOx 800 mg QD,  mg BID  Lab Results   Component Value Date    .0 (H) 10/12/2023    .0 (H) 10/12/2023    CALCIUM 8.9 11/06/2023    PHOS 2.4 (L) 11/06/2023      Latest Reference Range & Units POD 25,  Kidney-Post 1 Year  11/06/23   Magnesium  1.6 - 2.6 mg/dL 1.5 (L)       5. Electrolytes:  Will monitor /guidelines  Sodium bicarb 650 mg BID-DC  Lab Results   Component Value Date     11/06/2023    K 4.7 11/06/2023     11/06/2023    CO2 26 11/06/2023       6. Anemia: stable, no need for intervention  Lab Results   Component Value Date    WBC 5.42 11/06/2023    HGB 10.3 (L) 11/06/2023    HCT 32.4 (L) 11/06/2023    MCV 93 11/06/2023     11/06/2023         7. Cytopenias: no significant cytopenias. Medicine list reviewed including potential causes of drug-induced cytopenias    8.  Proteinuria: continue p/c ratio as per guidelines   [facility] [place]: 0.29    9. BK virus infection screening:  will continue to monitor per guidelines    10. Weight education: provided during the clinic visit   Body mass index is 23.85 kg/m².     11.Patient safety education regarding immunosuppression including prophylaxis posttransplant for CMV, PCP : Education provided about vaccination and prevention of infections     PCP ppx: mepron STOP 4/13/24  CMV ppx: valcyte STOP 1/13/24         Follow-up:   Clinic: return to transplant clinic weekly for the first month after transplant; every 2 weeks  during months 2-3; then at 6-, 9-, 12-, 18-, 24-, and 36- months post-transplant to reassess for complications from immunosuppression toxicity and monitor for rejection.  Annually thereafter.    Labs: since patient remains at high risk for rejection and drug-related complications that warrant close monitoring, labs will be ordered as follows: continue twice weekly CBC, renal panel, and drug level for first month; then same labs once weekly through 3rd month post-transplant.  Urine for UA and protein/creatinine ratio monthly.  Serum BK - PCR at 1-, 3-, 6-, 9-, 12-, 18-, 24-, 36-, 48-, and 60 months post-transplant.  Hepatic panel at 1-, 2-, 3-, 6-, 9-, 12-, 18-, 24-, and 36- months post-transplant.    Annika Colon NP       Education:   Material provided to the patient.  Patient reminded to call with any health changes since these can be early signs of significant complications.  Also, I advised the patient to be sure any new medications or changes of old medications are discussed with either a pharmacist or physician knowledgeable with transplant to avoid rejection/drug toxicity related to significant drug interactions.    Patient advised that it is recommended that all transplanted patients, and their close contacts and household members receive Covid vaccination.

## 2023-11-07 NOTE — ASSESSMENT & PLAN NOTE
Will remain on immunosuppressant therapy along with prednisone daily  This is known to worsen BG control and if stress steroids are needed then insulin needs will change

## 2023-11-07 NOTE — PROGRESS NOTES
I have seen the patient, reviewed the fellow's history and physical, assessment, plan, and progress note. I have personally interviewed and examined the patient at bedside and agree with the findings.     Blood sugar control is overall improving.  Will adjust prandial NovoLog slightly.  Patient is a good candidate for CGM. Patient has diabetes mellitus and manages diabetes with an intensive insulin regimen. The patient requires a therapeutic CGM and is willing to use therapeutic CGM for the necessary frequent adjustments of insulin therapy. Patient has been using SMBG for frequent glucose monitoring (4+ times daily). I have completed an in-person visit during the previous 6 months and will continue to have in-person visits every 6 months to assess adherence to their CGM regimen and diabetes treatment plan.    Send Rx for Dexcom G7. Sample sensor was provided.    Neto Mei MD  Endocrinology Staff

## 2023-11-08 ENCOUNTER — PROCEDURE VISIT (OUTPATIENT)
Dept: UROLOGY | Facility: CLINIC | Age: 63
End: 2023-11-08
Payer: COMMERCIAL

## 2023-11-08 ENCOUNTER — OFFICE VISIT (OUTPATIENT)
Dept: TRANSPLANT | Facility: CLINIC | Age: 63
End: 2023-11-08
Payer: COMMERCIAL

## 2023-11-08 VITALS
SYSTOLIC BLOOD PRESSURE: 129 MMHG | BODY MASS INDEX: 23.8 KG/M2 | TEMPERATURE: 97 F | RESPIRATION RATE: 16 BRPM | HEIGHT: 70 IN | WEIGHT: 166.25 LBS | HEART RATE: 88 BPM | OXYGEN SATURATION: 100 % | DIASTOLIC BLOOD PRESSURE: 68 MMHG

## 2023-11-08 VITALS
TEMPERATURE: 98 F | DIASTOLIC BLOOD PRESSURE: 74 MMHG | WEIGHT: 166.13 LBS | HEART RATE: 76 BPM | SYSTOLIC BLOOD PRESSURE: 134 MMHG | BODY MASS INDEX: 23.78 KG/M2 | RESPIRATION RATE: 16 BRPM | HEIGHT: 70 IN

## 2023-11-08 DIAGNOSIS — N02.B9 IGA NEPHROPATHY: ICD-10-CM

## 2023-11-08 DIAGNOSIS — I15.0 RENOVASCULAR HYPERTENSION: ICD-10-CM

## 2023-11-08 DIAGNOSIS — Z94.0 S/P KIDNEY TRANSPLANT: Primary | ICD-10-CM

## 2023-11-08 DIAGNOSIS — Z79.60 LONG-TERM USE OF IMMUNOSUPPRESSANT MEDICATION: ICD-10-CM

## 2023-11-08 DIAGNOSIS — Z94.0 KIDNEY REPLACED BY TRANSPLANT: Primary | ICD-10-CM

## 2023-11-08 DIAGNOSIS — Z91.89 AT RISK FOR OPPORTUNISTIC INFECTIONS: ICD-10-CM

## 2023-11-08 PROCEDURE — 1159F PR MEDICATION LIST DOCUMENTED IN MEDICAL RECORD: ICD-10-PCS | Mod: CPTII,S$GLB,, | Performed by: NURSE PRACTITIONER

## 2023-11-08 PROCEDURE — 99999 PR PBB SHADOW E&M-EST. PATIENT-LVL V: ICD-10-PCS | Mod: PBBFAC,,, | Performed by: NURSE PRACTITIONER

## 2023-11-08 PROCEDURE — 3044F HG A1C LEVEL LT 7.0%: CPT | Mod: CPTII,S$GLB,, | Performed by: NURSE PRACTITIONER

## 2023-11-08 PROCEDURE — 52310 CYSTOSCOPY AND TREATMENT: CPT | Mod: S$GLB,,, | Performed by: UROLOGY

## 2023-11-08 PROCEDURE — 3044F PR MOST RECENT HEMOGLOBIN A1C LEVEL <7.0%: ICD-10-PCS | Mod: CPTII,S$GLB,, | Performed by: NURSE PRACTITIONER

## 2023-11-08 PROCEDURE — 52310 PR CYSTOSCOPY,REMV CALCULUS,SIMPLE: ICD-10-PCS | Mod: S$GLB,,, | Performed by: UROLOGY

## 2023-11-08 PROCEDURE — 3078F PR MOST RECENT DIASTOLIC BLOOD PRESSURE < 80 MM HG: ICD-10-PCS | Mod: CPTII,S$GLB,, | Performed by: NURSE PRACTITIONER

## 2023-11-08 PROCEDURE — 3066F PR DOCUMENTATION OF TREATMENT FOR NEPHROPATHY: ICD-10-PCS | Mod: CPTII,S$GLB,, | Performed by: NURSE PRACTITIONER

## 2023-11-08 PROCEDURE — 1111F PR DISCHARGE MEDS RECONCILED W/ CURRENT OUTPATIENT MED LIST: ICD-10-PCS | Mod: CPTII,S$GLB,, | Performed by: NURSE PRACTITIONER

## 2023-11-08 PROCEDURE — 1160F PR REVIEW ALL MEDS BY PRESCRIBER/CLIN PHARMACIST DOCUMENTED: ICD-10-PCS | Mod: CPTII,S$GLB,, | Performed by: NURSE PRACTITIONER

## 2023-11-08 PROCEDURE — 1159F MED LIST DOCD IN RCRD: CPT | Mod: CPTII,S$GLB,, | Performed by: NURSE PRACTITIONER

## 2023-11-08 PROCEDURE — 3066F NEPHROPATHY DOC TX: CPT | Mod: CPTII,S$GLB,, | Performed by: NURSE PRACTITIONER

## 2023-11-08 PROCEDURE — 4010F PR ACE/ARB THEARPY RXD/TAKEN: ICD-10-PCS | Mod: CPTII,S$GLB,, | Performed by: NURSE PRACTITIONER

## 2023-11-08 PROCEDURE — 99215 PR OFFICE/OUTPT VISIT, EST, LEVL V, 40-54 MIN: ICD-10-PCS | Mod: S$GLB,,, | Performed by: NURSE PRACTITIONER

## 2023-11-08 PROCEDURE — 1111F DSCHRG MED/CURRENT MED MERGE: CPT | Mod: CPTII,S$GLB,, | Performed by: NURSE PRACTITIONER

## 2023-11-08 PROCEDURE — 1160F RVW MEDS BY RX/DR IN RCRD: CPT | Mod: CPTII,S$GLB,, | Performed by: NURSE PRACTITIONER

## 2023-11-08 PROCEDURE — 99215 OFFICE O/P EST HI 40 MIN: CPT | Mod: S$GLB,,, | Performed by: NURSE PRACTITIONER

## 2023-11-08 PROCEDURE — 99999 PR PBB SHADOW E&M-EST. PATIENT-LVL V: CPT | Mod: PBBFAC,,, | Performed by: NURSE PRACTITIONER

## 2023-11-08 PROCEDURE — 3074F PR MOST RECENT SYSTOLIC BLOOD PRESSURE < 130 MM HG: ICD-10-PCS | Mod: CPTII,S$GLB,, | Performed by: NURSE PRACTITIONER

## 2023-11-08 PROCEDURE — 3008F BODY MASS INDEX DOCD: CPT | Mod: CPTII,S$GLB,, | Performed by: NURSE PRACTITIONER

## 2023-11-08 PROCEDURE — 3078F DIAST BP <80 MM HG: CPT | Mod: CPTII,S$GLB,, | Performed by: NURSE PRACTITIONER

## 2023-11-08 PROCEDURE — 3074F SYST BP LT 130 MM HG: CPT | Mod: CPTII,S$GLB,, | Performed by: NURSE PRACTITIONER

## 2023-11-08 PROCEDURE — 4010F ACE/ARB THERAPY RXD/TAKEN: CPT | Mod: CPTII,S$GLB,, | Performed by: NURSE PRACTITIONER

## 2023-11-08 PROCEDURE — 3008F PR BODY MASS INDEX (BMI) DOCUMENTED: ICD-10-PCS | Mod: CPTII,S$GLB,, | Performed by: NURSE PRACTITIONER

## 2023-11-08 RX ORDER — LIDOCAINE HYDROCHLORIDE 20 MG/ML
JELLY TOPICAL
Status: COMPLETED | OUTPATIENT
Start: 2023-11-08 | End: 2023-11-08

## 2023-11-08 RX ORDER — VALGANCICLOVIR 450 MG/1
900 TABLET, FILM COATED ORAL DAILY
Qty: 60 TABLET | Refills: 2 | Status: SHIPPED | OUTPATIENT
Start: 2023-11-08 | End: 2024-01-18

## 2023-11-08 RX ORDER — DOXYCYCLINE HYCLATE 100 MG
100 TABLET ORAL
Status: COMPLETED | OUTPATIENT
Start: 2023-11-08 | End: 2023-11-08

## 2023-11-08 RX ADMIN — Medication 100 MG: at 10:11

## 2023-11-08 RX ADMIN — LIDOCAINE HYDROCHLORIDE: 20 JELLY TOPICAL at 10:11

## 2023-11-08 NOTE — PATIENT INSTRUCTIONS
What to Expect After a Cystoscopy with Stent Removal  For the next 24-48 hours, you may feel a mild burning when you urinate. This burning is normal and expected. Drink plenty of water to dilute the urine to help relieve the burning sensation. You may also see a small amount of blood in your urine after the procedure.    Unless you are already taking antibiotics, you may be given an antibiotic after the test to prevent infection.    Signs and Symptoms to Report  Call the [facility] at [phone] if you develop any of the following:  Fever of 101 degrees or higher  Chills or persistent bleeding  Inability to urinate    After hours or on weekends, you may reach a urology resident on call at this number: [phone].

## 2023-11-08 NOTE — LETTER
November 8, 2023        Jannet Farias  [address] [place] [address]  Phone: [phone]  Fax: [phone]             [facility] Trans[facility] 1st Fl  [address]  [facility] [address]  Phone: [phone]   Patient: Heath Hernandez   MR Number: [id number]   YOB: 1960   Date of Visit: 11/8/2023       Dear Dr. Jannet Farias    Thank you for referring Heath Hernandez to me for evaluation. Attached you will find relevant portions of my assessment and plan of care.    If you have questions, please do not hesitate to call me. I look forward to following Heath Hernandez along with you.    Sincerely,    Annika Colon NP    Enclosure    If you would like to receive this communication electronically, please contact [email or web address] or [phone] to request [employer] access.    [employer] is a tool which provides read-only access to select patient information with whom you have a relationship. Its easy to use and provides real time access to review your patients record including encounter summaries, notes, results, and demographic information.    If you feel you have received this communication in error or would no longer like to receive these types of communications, please e-mail [email or web address]

## 2023-11-08 NOTE — PATIENT INSTRUCTIONS
STOP sodium bicarbonate  Increase valcyte 900 mg (2 pills) once a daily. (Stop 1/13/2024)    It is my privilege to participate in your transplant care! Please be sure to let us know if you have any questions or concerns about your health care - we cannot help you if we do not know. Don't forget we are on call 24/7 for any emergencies.      Best Wishes,  Annika CERVANTESC

## 2023-11-09 ENCOUNTER — PATIENT MESSAGE (OUTPATIENT)
Dept: TRANSPLANT | Facility: CLINIC | Age: 63
End: 2023-11-09
Payer: COMMERCIAL

## 2023-11-09 ENCOUNTER — LAB VISIT (OUTPATIENT)
Dept: LAB | Facility: HOSPITAL | Age: 63
End: 2023-11-09
Attending: INTERNAL MEDICINE
Payer: COMMERCIAL

## 2023-11-09 DIAGNOSIS — Z94.0 KIDNEY REPLACED BY TRANSPLANT: ICD-10-CM

## 2023-11-09 LAB
ALBUMIN SERPL BCP-MCNC: 3.3 G/DL (ref 3.5–5.2)
ANION GAP SERPL CALC-SCNC: 7 MMOL/L (ref 8–16)
BASOPHILS # BLD AUTO: 0.07 K/UL (ref 0–0.2)
BASOPHILS NFR BLD: 1.1 % (ref 0–1.9)
BUN SERPL-MCNC: 18 MG/DL (ref 8–23)
CALCIUM SERPL-MCNC: 9.1 MG/DL (ref 8.7–10.5)
CHLORIDE SERPL-SCNC: 108 MMOL/L (ref 95–110)
CO2 SERPL-SCNC: 27 MMOL/L (ref 23–29)
CREAT SERPL-MCNC: 1.5 MG/DL (ref 0.5–1.4)
DIFFERENTIAL METHOD: ABNORMAL
EOSINOPHIL # BLD AUTO: 0.2 K/UL (ref 0–0.5)
EOSINOPHIL NFR BLD: 2.4 % (ref 0–8)
ERYTHROCYTE [DISTWIDTH] IN BLOOD BY AUTOMATED COUNT: 15 % (ref 11.5–14.5)
EST. GFR  (NO RACE VARIABLE): 52.6 ML/MIN/1.73 M^2
GLUCOSE SERPL-MCNC: 139 MG/DL (ref 70–110)
HCT VFR BLD AUTO: 32.6 % (ref 40–54)
HGB BLD-MCNC: 10.3 G/DL (ref 14–18)
IMM GRANULOCYTES # BLD AUTO: 0.08 K/UL (ref 0–0.04)
IMM GRANULOCYTES NFR BLD AUTO: 1.3 % (ref 0–0.5)
LYMPHOCYTES # BLD AUTO: 1 K/UL (ref 1–4.8)
LYMPHOCYTES NFR BLD: 16.6 % (ref 18–48)
MAGNESIUM SERPL-MCNC: 1.5 MG/DL (ref 1.6–2.6)
MCH RBC QN AUTO: 29 PG (ref 27–31)
MCHC RBC AUTO-ENTMCNC: 31.6 G/DL (ref 32–36)
MCV RBC AUTO: 92 FL (ref 82–98)
MONOCYTES # BLD AUTO: 0.3 K/UL (ref 0.3–1)
MONOCYTES NFR BLD: 4.7 % (ref 4–15)
NEUTROPHILS # BLD AUTO: 4.5 K/UL (ref 1.8–7.7)
NEUTROPHILS NFR BLD: 73.9 % (ref 38–73)
NRBC BLD-RTO: 0 /100 WBC
PHOSPHATE SERPL-MCNC: 2.4 MG/DL (ref 2.7–4.5)
PLATELET # BLD AUTO: 237 K/UL (ref 150–450)
PMV BLD AUTO: 11.3 FL (ref 9.2–12.9)
POTASSIUM SERPL-SCNC: 5.1 MMOL/L (ref 3.5–5.1)
RBC # BLD AUTO: 3.55 M/UL (ref 4.6–6.2)
SODIUM SERPL-SCNC: 142 MMOL/L (ref 136–145)
TACROLIMUS BLD-MCNC: 10.7 NG/ML (ref 5–15)
WBC # BLD AUTO: 6.14 K/UL (ref 3.9–12.7)

## 2023-11-09 PROCEDURE — 80069 RENAL FUNCTION PANEL: CPT | Performed by: INTERNAL MEDICINE

## 2023-11-09 PROCEDURE — 36415 COLL VENOUS BLD VENIPUNCTURE: CPT | Performed by: INTERNAL MEDICINE

## 2023-11-09 PROCEDURE — 80197 ASSAY OF TACROLIMUS: CPT | Performed by: INTERNAL MEDICINE

## 2023-11-09 PROCEDURE — 85025 COMPLETE CBC W/AUTO DIFF WBC: CPT | Performed by: INTERNAL MEDICINE

## 2023-11-09 PROCEDURE — 83735 ASSAY OF MAGNESIUM: CPT | Performed by: INTERNAL MEDICINE

## 2023-11-15 ENCOUNTER — DOCUMENTATION ONLY (OUTPATIENT)
Dept: TRANSPLANT | Facility: CLINIC | Age: 63
End: 2023-11-15

## 2023-11-15 ENCOUNTER — OFFICE VISIT (OUTPATIENT)
Dept: TRANSPLANT | Facility: CLINIC | Age: 63
End: 2023-11-15
Payer: COMMERCIAL

## 2023-11-15 VITALS
RESPIRATION RATE: 18 BRPM | WEIGHT: 167.31 LBS | DIASTOLIC BLOOD PRESSURE: 68 MMHG | HEART RATE: 89 BPM | BODY MASS INDEX: 23.95 KG/M2 | TEMPERATURE: 97 F | HEIGHT: 70 IN | SYSTOLIC BLOOD PRESSURE: 145 MMHG | OXYGEN SATURATION: 100 %

## 2023-11-15 DIAGNOSIS — Z94.0 S/P KIDNEY TRANSPLANT: Primary | ICD-10-CM

## 2023-11-15 LAB
EXT ALBUMIN: 4.3 (ref 3.9–4.9)
EXT ALKALINE PHOSPHATASE: 100 (ref 44–121)
EXT ALT: 20 (ref 0–44)
EXT AST: 19 (ref 0–40)
EXT BILIRUBIN DIRECT: 0.13 MG/DL (ref 0–0.4)
EXT BILIRUBIN TOTAL: 0.3 (ref 0–1.2)
EXT BK VIRUS DNA QN PCR: NEGATIVE
EXT BUN: 15 (ref 8–27)
EXT CALCIUM: 9.5 (ref 8.6–10.2)
EXT CREATININE UA: 100.5
EXT CREATININE: 1.5 MG/DL (ref 0.76–1.27)
EXT EGFR NO RACE VARIABLE: 53
EXT EOSINOPHIL%: 2
EXT GLUCOSE: 173 (ref 70–99)
EXT HBV DNA QUANT PCR: DETECTED
EXT HCV QUANT: NOT DETECTED
EXT HEMATOCRIT: 38.1 (ref 37.5–51)
EXT HEMOGLOBIN: 12.1 (ref 13–17.7)
EXT HIV RNA QUANT PCR: <20
EXT LYMPH%: 16
EXT MAGNESIUM: 1.6 (ref 1.6–2.3)
EXT MONOCYTES%: 3
EXT PLATELETS: 270 (ref 150–450)
EXT POTASSIUM: 5.8 (ref 3.5–5.2)
EXT PROT/CREAT RATIO UR: 172 (ref 0–20)
EXT PROTEIN TOTAL: 6.6 (ref 6–8.5)
EXT SEGS%: 77
EXT SODIUM: 141 MMOL/L
EXT URINE PROTEIN: 17.3
EXT WBC: 8.5 (ref 3.4–10.8)

## 2023-11-15 PROCEDURE — 1159F MED LIST DOCD IN RCRD: CPT | Mod: CPTII,S$GLB,, | Performed by: TRANSPLANT SURGERY

## 2023-11-15 PROCEDURE — 3066F NEPHROPATHY DOC TX: CPT | Mod: CPTII,S$GLB,, | Performed by: TRANSPLANT SURGERY

## 2023-11-15 PROCEDURE — 99214 PR OFFICE/OUTPT VISIT, EST, LEVL IV, 30-39 MIN: ICD-10-PCS | Mod: 24,S$GLB,, | Performed by: TRANSPLANT SURGERY

## 2023-11-15 PROCEDURE — 3077F SYST BP >= 140 MM HG: CPT | Mod: CPTII,S$GLB,, | Performed by: TRANSPLANT SURGERY

## 2023-11-15 PROCEDURE — 99214 OFFICE O/P EST MOD 30 MIN: CPT | Mod: 24,S$GLB,, | Performed by: TRANSPLANT SURGERY

## 2023-11-15 PROCEDURE — 3044F PR MOST RECENT HEMOGLOBIN A1C LEVEL <7.0%: ICD-10-PCS | Mod: CPTII,S$GLB,, | Performed by: TRANSPLANT SURGERY

## 2023-11-15 PROCEDURE — 1111F DSCHRG MED/CURRENT MED MERGE: CPT | Mod: CPTII,S$GLB,, | Performed by: TRANSPLANT SURGERY

## 2023-11-15 PROCEDURE — 1111F PR DISCHARGE MEDS RECONCILED W/ CURRENT OUTPATIENT MED LIST: ICD-10-PCS | Mod: CPTII,S$GLB,, | Performed by: TRANSPLANT SURGERY

## 2023-11-15 PROCEDURE — 3077F PR MOST RECENT SYSTOLIC BLOOD PRESSURE >= 140 MM HG: ICD-10-PCS | Mod: CPTII,S$GLB,, | Performed by: TRANSPLANT SURGERY

## 2023-11-15 PROCEDURE — 3008F BODY MASS INDEX DOCD: CPT | Mod: CPTII,S$GLB,, | Performed by: TRANSPLANT SURGERY

## 2023-11-15 PROCEDURE — 3008F PR BODY MASS INDEX (BMI) DOCUMENTED: ICD-10-PCS | Mod: CPTII,S$GLB,, | Performed by: TRANSPLANT SURGERY

## 2023-11-15 PROCEDURE — 3078F PR MOST RECENT DIASTOLIC BLOOD PRESSURE < 80 MM HG: ICD-10-PCS | Mod: CPTII,S$GLB,, | Performed by: TRANSPLANT SURGERY

## 2023-11-15 PROCEDURE — 99999 PR PBB SHADOW E&M-EST. PATIENT-LVL IV: CPT | Mod: PBBFAC,,,

## 2023-11-15 PROCEDURE — 1159F PR MEDICATION LIST DOCUMENTED IN MEDICAL RECORD: ICD-10-PCS | Mod: CPTII,S$GLB,, | Performed by: TRANSPLANT SURGERY

## 2023-11-15 PROCEDURE — 3044F HG A1C LEVEL LT 7.0%: CPT | Mod: CPTII,S$GLB,, | Performed by: TRANSPLANT SURGERY

## 2023-11-15 PROCEDURE — 4010F PR ACE/ARB THEARPY RXD/TAKEN: ICD-10-PCS | Mod: CPTII,S$GLB,, | Performed by: TRANSPLANT SURGERY

## 2023-11-15 PROCEDURE — 4010F ACE/ARB THERAPY RXD/TAKEN: CPT | Mod: CPTII,S$GLB,, | Performed by: TRANSPLANT SURGERY

## 2023-11-15 PROCEDURE — 99999 PR PBB SHADOW E&M-EST. PATIENT-LVL IV: ICD-10-PCS | Mod: PBBFAC,,,

## 2023-11-15 PROCEDURE — 3066F PR DOCUMENTATION OF TREATMENT FOR NEPHROPATHY: ICD-10-PCS | Mod: CPTII,S$GLB,, | Performed by: TRANSPLANT SURGERY

## 2023-11-15 PROCEDURE — 3078F DIAST BP <80 MM HG: CPT | Mod: CPTII,S$GLB,, | Performed by: TRANSPLANT SURGERY

## 2023-11-15 NOTE — PROGRESS NOTES
Transplant Surgery  Kidney Transplant Recipient Follow-up    Referring Physician: Jannet Farias  Current Nephrologist: Jannet Farias    Subjective:     Chief Complaint: Heath Hernandez is a 63 y.o. year old White male who is status post Kidney transplant performed on 10/12/2023. He had takeback for subcapsular hematoma, which resolved but he's been leaking from drain since then.      ORGAN: LEFT KIDNEY   Disease Etiology: Diabetes Mellitus - Type II  Donor Type: Living   Donor CMV Status:    Donor HBcAB:    Donor HCV Status:      History of Present Illness: He reports <5cc of clear output from KEYA drain.  From a transplant perspective, he reports normal urination.  Heath is here for management of his immunosuppression medication.  Heath states that his immunosuppression is being well tolerated.  Hypertension is not present.    External provider notes reviewed: Yes    Review of Systems    Objective:     Physical Exam  Constitutional:       Appearance: He is well-developed.   HENT:      Head: Normocephalic and atraumatic.   Eyes:      Pupils: Pupils are equal, round, and reactive to light.   Neck:      Vascular: No JVD.   Cardiovascular:      Rate and Rhythm: Normal rate and regular rhythm.      Heart sounds: Normal heart sounds.   Pulmonary:      Effort: Pulmonary effort is normal.      Breath sounds: Normal breath sounds. No stridor.   Abdominal:      General: There is no distension.      Palpations: Abdomen is soft.      Tenderness: There is no abdominal tenderness.       Musculoskeletal:         General: Normal range of motion.   Skin:     General: Skin is warm and dry.   Neurological:      Mental Status: He is alert and oriented to person, place, and time.   Psychiatric:         Behavior: Behavior normal.       Lab Results   Component Value Date    CREATININE 1.5 (H) 11/09/2023    BUN 18 11/09/2023     Lab Results   Component Value Date    WBC 6.14 11/09/2023    HGB 10.3 (L) 11/09/2023    HCT 32.6 (L)  11/09/2023    HCT 34 (L) 10/12/2023     11/09/2023     Lab Results   Component Value Date    TACROLIMUS 10.7 11/09/2023       Diagnostics:  The following labs have been reviewed: CBC  CMP  PT  TACROLIMUS LEVEL  The following radiology images have been independently reviewed and interpreted: Renal US    Assessment and Plan:        S/P Kidney transplant.  Low grade urine leak from the decapsulated kidney parenchyma - output now decreased to <5cc per day.  KEYA drain removed in clinic.  Chronic immunosuppressive medications for rejection prophylaxis at target.  Plan: no adjustment needed.  Continue monitoring symptoms, labs and drug levels for drug-related toxicity and side effects.  Renal hypertension at target.    Additional testing to be completed according to the Kidney: Written Order Guideline for Kidney Transplant Follow-Up (KI-09)    Interpretation of tests and discussion of patient management involves all members of the multidisciplinary transplant team.  Patient advised that it is recommended that all transplant candidates, and their close contacts and household members receive Covid vaccination.  Follow-up: Patient reminded to call with any health changes, since these can be early signs of significant complications.  Also, I advised the patient to be sure any new medications or changes of old medications are discussed with either a pharmacist, or physician knowledgeable with transplant to avoid rejection/drug toxicity related to significant drug interactions.    HOLDEN AGUIRRE MD       [facility] Patient Status  Functional Status: 90% - Able to carry on normal activity: minor symptoms of disease  Physical Capacity: No Limitations

## 2023-11-16 ENCOUNTER — PATIENT MESSAGE (OUTPATIENT)
Dept: TRANSPLANT | Facility: CLINIC | Age: 63
End: 2023-11-16
Payer: COMMERCIAL

## 2023-11-16 ENCOUNTER — TELEPHONE (OUTPATIENT)
Dept: TRANSPLANT | Facility: CLINIC | Age: 63
End: 2023-11-16
Payer: COMMERCIAL

## 2023-11-16 DIAGNOSIS — Z94.0 S/P KIDNEY TRANSPLANT: ICD-10-CM

## 2023-11-16 DIAGNOSIS — Z94.0 S/P KIDNEY TRANSPLANT: Primary | ICD-10-CM

## 2023-11-16 DIAGNOSIS — Z79.60 LONG-TERM USE OF IMMUNOSUPPRESSANT MEDICATION: ICD-10-CM

## 2023-11-16 LAB
ALBUMIN SERPL-MCNC: 4.3 G/DL (ref 3.9–4.9)
ALP SERPL-CCNC: 100 IU/L (ref 44–121)
ALT SERPL-CCNC: 20 IU/L (ref 0–44)
APPEARANCE UR: CLEAR
AST SERPL-CCNC: 19 IU/L (ref 0–40)
BASOPHILS # BLD AUTO: 0.1 X10E3/UL (ref 0–0.2)
BASOPHILS NFR BLD AUTO: 1 %
BILIRUB DIRECT SERPL-MCNC: 0.13 MG/DL (ref 0–0.4)
BILIRUB SERPL-MCNC: 0.3 MG/DL (ref 0–1.2)
BILIRUB UR QL STRIP: NEGATIVE
BKV DNA SPEC NAA+PROBE-ACNC: NEGATIVE IU/ML
BUN SERPL-MCNC: 15 MG/DL (ref 8–27)
BUN/CREAT SERPL: 10 (ref 10–24)
CALCIUM SERPL-MCNC: 9.5 MG/DL (ref 8.6–10.2)
CHLORIDE SERPL-SCNC: 104 MMOL/L (ref 96–106)
CO2 SERPL-SCNC: 22 MMOL/L (ref 20–29)
COLOR UR: YELLOW
CREAT SERPL-MCNC: 1.5 MG/DL (ref 0.76–1.27)
CREAT UR-MCNC: 100.5 MG/DL
EOSINOPHIL # BLD AUTO: 0.1 X10E3/UL (ref 0–0.4)
EOSINOPHIL NFR BLD AUTO: 2 %
ERYTHROCYTE [DISTWIDTH] IN BLOOD BY AUTOMATED COUNT: 14 % (ref 11.6–15.4)
EST. GFR  (NO RACE VARIABLE): 53 ML/MIN/1.73
GLUCOSE SERPL-MCNC: 173 MG/DL (ref 70–99)
GLUCOSE UR QL STRIP: NEGATIVE
HBV DNA SERPL NAA+PROBE-ACNC: NORMAL IU/ML
HBV DNA SERPL NAA+PROBE-LOG IU: NORMAL LOG10 IU/ML
HCT VFR BLD AUTO: 38.1 % (ref 37.5–51)
HCV IGG SERPL QL IA: NON REACTIVE
HCV RNA SERPL NAA+PROBE-ACNC: NORMAL IU/ML
HGB BLD-MCNC: 12.1 G/DL (ref 13–17.7)
HGB UR QL STRIP: NEGATIVE
HIV1 RNA # SERPL NAA+PROBE: <20 COPIES/ML
HIV1 RNA SERPL NAA+PROBE-LOG#: NORMAL LOG10COPY/ML
IMM GRANULOCYTES # BLD AUTO: 0.1 X10E3/UL (ref 0–0.1)
IMM GRANULOCYTES NFR BLD AUTO: 1 %
KETONES UR QL STRIP: NEGATIVE
LEUKOCYTE ESTERASE UR QL STRIP: NEGATIVE
LYMPHOCYTES # BLD AUTO: 1.4 X10E3/UL (ref 0.7–3.1)
LYMPHOCYTES NFR BLD AUTO: 16 %
MAGNESIUM SERPL-MCNC: 1.6 MG/DL (ref 1.6–2.3)
MCH RBC QN AUTO: 30 PG (ref 26.6–33)
MCHC RBC AUTO-ENTMCNC: 31.8 G/DL (ref 31.5–35.7)
MCV RBC AUTO: 94 FL (ref 79–97)
MICRO URNS: NORMAL
MONOCYTES # BLD AUTO: 0.3 X10E3/UL (ref 0.1–0.9)
MONOCYTES NFR BLD AUTO: 3 %
NEUTROPHILS # BLD AUTO: 6.5 X10E3/UL (ref 1.4–7)
NEUTROPHILS NFR BLD AUTO: 77 %
NITRITE UR QL STRIP: NEGATIVE
PH UR STRIP: 6 [PH] (ref 5–7.5)
PLATELET # BLD AUTO: 270 X10E3/UL (ref 150–450)
POTASSIUM SERPL-SCNC: 5.8 MMOL/L (ref 3.5–5.2)
PROT SERPL-MCNC: 6.6 G/DL (ref 6–8.5)
PROT UR QL STRIP: NORMAL
PROT UR-MCNC: 17.3 MG/DL
PROT/CREAT UR: 172 MG/G CREAT (ref 0–200)
RBC # BLD AUTO: 4.04 X10E6/UL (ref 4.14–5.8)
REF LAB TEST REF RANGE: NORMAL
SODIUM SERPL-SCNC: 141 MMOL/L (ref 134–144)
SP GR UR STRIP: 1.01 (ref 1–1.03)
SPECIMEN STATUS REPORT: NORMAL
TACROLIMUS BLD LC/MS/MS-MCNC: 11 NG/ML (ref 2–20)
TEST INFORMATION: NORMAL
UROBILINOGEN UR STRIP-MCNC: 0.2 MG/DL (ref 0.2–1)
WBC # BLD AUTO: 8.5 X10E3/UL (ref 3.4–10.8)

## 2023-11-16 RX ORDER — TACROLIMUS 1 MG/1
CAPSULE ORAL
Qty: 480 CAPSULE | Refills: 11 | Status: SHIPPED | OUTPATIENT
Start: 2023-11-16 | End: 2023-12-04 | Stop reason: DRUGHIGH

## 2023-11-16 NOTE — TELEPHONE ENCOUNTER
Spoke with wife requesting something to take for K. Stated he  follows a low K diet but doesn't want to anymore and is upset he can not eat what he wants. Spoke to Dr. Turner--stated ok to start Lokelma 10 gm daily. Pt going to ER for K of 5.8

## 2023-11-16 NOTE — TELEPHONE ENCOUNTER
Please kayexalate 30 gram every 4 hours x 2 doses today then 30 gram daily for 2 more days .   Start ASAP     [place] [facility] [place] has this medication [place]--instructed pt to go to the ER.

## 2023-11-16 NOTE — TELEPHONE ENCOUNTER
Lokelma ordered.   FK changed   Labs on Monday    ----- Message from Gloria Turner MD sent at 11/16/2023 11:47 AM CST -----  ER today  Lokelma 10 gram daily. Lab on Monday. Low K diet. More water intake   Tac 4 mg BID

## 2023-11-17 ENCOUNTER — PATIENT MESSAGE (OUTPATIENT)
Dept: TRANSPLANT | Facility: CLINIC | Age: 63
End: 2023-11-17
Payer: COMMERCIAL

## 2023-11-21 ENCOUNTER — DOCUMENTATION ONLY (OUTPATIENT)
Dept: TRANSPLANT | Facility: CLINIC | Age: 63
End: 2023-11-21
Payer: COMMERCIAL

## 2023-11-21 LAB
EXT ALBUMIN: ABNORMAL
EXT ALKALINE PHOSPHATASE: ABNORMAL
EXT ALLOSURE: ABNORMAL
EXT ALT: ABNORMAL
EXT AMYLASE: ABNORMAL
EXT ANC: ABNORMAL
EXT AST: ABNORMAL
EXT BACTERIA UA: ABNORMAL
EXT BANDS%: ABNORMAL
EXT BILIRUBIN DIRECT: ABNORMAL
EXT BILIRUBIN TOTAL: ABNORMAL
EXT BK VIRUS DNA QN PCR: ABNORMAL
EXT BUN: 19 (ref 8–27)
EXT C PEPTIDE: ABNORMAL
EXT CALCIUM: 9.8 (ref 3.5–5.2)
EXT CHLORIDE: 104 (ref 96–106)
EXT CHOLESTEROL: ABNORMAL
EXT CMV DNA QUANT. BY PCR: ABNORMAL
EXT CO2: 23 (ref 20–29)
EXT CREATININE UA: ABNORMAL
EXT CREATININE: 1.45 MG/DL (ref 0.76–1.27)
EXT CYCLOSPORINE LVL: ABNORMAL
EXT EBV DNA BY PCR: ABNORMAL
EXT EBV IGG: ABNORMAL
EXT EGFR NO RACE VARIABLE: 55
EXT EOSINOPHIL%: 1
EXT FERRITIN: ABNORMAL
EXT GFR MDRD AF AMER: ABNORMAL
EXT GFR MDRD NON AF AMER: ABNORMAL
EXT GLUCOSE UA: ABNORMAL
EXT GLUCOSE: 156 (ref 70–99)
EXT HBV DNA QUANT PCR: ABNORMAL
EXT HCV QUANT: ABNORMAL
EXT HDL: ABNORMAL
EXT HEMATOCRIT: 35.6 (ref 37.5–51)
EXT HEMOGLOBIN A1C: ABNORMAL
EXT HEMOGLOBIN: 11.4 (ref 13–17.7)
EXT HIV RNA QUANT PCR: ABNORMAL
EXT IMMUNKNOW (STIMULATED): ABNORMAL
EXT IRON SATURATION: ABNORMAL
EXT LDH, TOTAL: ABNORMAL
EXT LDL CHOLESTEROL: ABNORMAL
EXT LEFLUNOMIDE METABOLITE: ABNORMAL
EXT LIPASE: ABNORMAL
EXT LYMPH%: 21
EXT MAGNESIUM: 1.4 (ref 1.6–2.3)
EXT MONOCYTES%: 2
EXT NITRITES UA: ABNORMAL
EXT PHOSPHORUS: 3.2 (ref 2.8–4.1)
EXT PLATELETS: 246 (ref 150–450)
EXT POTASSIUM: 5.5 (ref 3.5–5.2)
EXT PROT/CREAT RATIO UR: ABNORMAL
EXT PROTEIN TOTAL: ABNORMAL
EXT PROTEIN UA: ABNORMAL
EXT PTH, INTACT: ABNORMAL
EXT RBC UA: ABNORMAL
EXT SEGS%: 74
EXT SERUM IRON: ABNORMAL
EXT SIROLIMUS LVL: ABNORMAL
EXT SODIUM: 138 MMOL/L (ref 134–144)
EXT TACROLIMUS LVL: ABNORMAL
EXT TIBC: ABNORMAL
EXT TRIGLYCERIDES: ABNORMAL
EXT URIC ACID: ABNORMAL
EXT URINE CULTURE: ABNORMAL
EXT URINE PROTEIN: ABNORMAL
EXT VIT D 25 HYDROXY: ABNORMAL
EXT WBC UA: ABNORMAL
EXT WBC: 5.6 (ref 3.4–10.8)
PARVOVIRUS B19 DNA BY PCR: ABNORMAL
QUANTIFERON GOLD TB: ABNORMAL

## 2023-11-22 LAB
BASOPHILS # BLD AUTO: 0.1 X10E3/UL (ref 0–0.2)
BASOPHILS NFR BLD AUTO: 1 %
BUN SERPL-MCNC: 19 MG/DL (ref 8–27)
BUN/CREAT SERPL: 13 (ref 10–24)
CALCIUM SERPL-MCNC: 9.8 MG/DL (ref 8.6–10.2)
CHLORIDE SERPL-SCNC: 104 MMOL/L (ref 96–106)
CO2 SERPL-SCNC: 23 MMOL/L (ref 20–29)
CREAT SERPL-MCNC: 1.45 MG/DL (ref 0.76–1.27)
EOSINOPHIL # BLD AUTO: 0.1 X10E3/UL (ref 0–0.4)
EOSINOPHIL NFR BLD AUTO: 1 %
ERYTHROCYTE [DISTWIDTH] IN BLOOD BY AUTOMATED COUNT: 14.2 % (ref 11.6–15.4)
EST. GFR  (NO RACE VARIABLE): 55 ML/MIN/1.73
GLUCOSE SERPL-MCNC: 156 MG/DL (ref 70–99)
HCT VFR BLD AUTO: 35.6 % (ref 37.5–51)
HGB BLD-MCNC: 11.4 G/DL (ref 13–17.7)
IMM GRANULOCYTES # BLD AUTO: 0.1 X10E3/UL (ref 0–0.1)
IMM GRANULOCYTES NFR BLD AUTO: 1 %
LYMPHOCYTES # BLD AUTO: 1.2 X10E3/UL (ref 0.7–3.1)
LYMPHOCYTES NFR BLD AUTO: 21 %
MAGNESIUM SERPL-MCNC: 1.4 MG/DL (ref 1.6–2.3)
MCH RBC QN AUTO: 29.9 PG (ref 26.6–33)
MCHC RBC AUTO-ENTMCNC: 32 G/DL (ref 31.5–35.7)
MCV RBC AUTO: 93 FL (ref 79–97)
MONOCYTES # BLD AUTO: 0.1 X10E3/UL (ref 0.1–0.9)
MONOCYTES NFR BLD AUTO: 2 %
NEUTROPHILS # BLD AUTO: 4.1 X10E3/UL (ref 1.4–7)
NEUTROPHILS NFR BLD AUTO: 74 %
PHOSPHATE SERPL-MCNC: 3.2 MG/DL (ref 2.8–4.1)
PLATELET # BLD AUTO: 246 X10E3/UL (ref 150–450)
POTASSIUM SERPL-SCNC: 5.5 MMOL/L (ref 3.5–5.2)
RBC # BLD AUTO: 3.81 X10E6/UL (ref 4.14–5.8)
SODIUM SERPL-SCNC: 138 MMOL/L (ref 134–144)
TACROLIMUS BLD LC/MS/MS-MCNC: 8.7 NG/ML (ref 2–20)
WBC # BLD AUTO: 5.6 X10E3/UL (ref 3.4–10.8)

## 2023-11-24 ENCOUNTER — PATIENT MESSAGE (OUTPATIENT)
Dept: TRANSPLANT | Facility: CLINIC | Age: 63
End: 2023-11-24
Payer: COMMERCIAL

## 2023-11-30 ENCOUNTER — TELEPHONE (OUTPATIENT)
Dept: TRANSPLANT | Facility: CLINIC | Age: 63
End: 2023-11-30
Payer: COMMERCIAL

## 2023-11-30 ENCOUNTER — TELEPHONE (OUTPATIENT)
Dept: TRANSPLANT | Facility: CLINIC | Age: 63
End: 2023-11-30

## 2023-11-30 ENCOUNTER — PATIENT MESSAGE (OUTPATIENT)
Dept: TRANSPLANT | Facility: CLINIC | Age: 63
End: 2023-11-30
Payer: COMMERCIAL

## 2023-11-30 LAB
ALBUMIN SERPL-MCNC: 4.5 G/DL (ref 3.9–4.9)
ALP SERPL-CCNC: 82 IU/L (ref 44–121)
ALT SERPL-CCNC: 15 IU/L (ref 0–44)
AST SERPL-CCNC: 14 IU/L (ref 0–40)
BASOPHILS # BLD AUTO: 0.1 X10E3/UL (ref 0–0.2)
BASOPHILS NFR BLD AUTO: 1 %
BILIRUB DIRECT SERPL-MCNC: 0.12 MG/DL (ref 0–0.4)
BILIRUB SERPL-MCNC: 0.3 MG/DL (ref 0–1.2)
BUN SERPL-MCNC: 20 MG/DL (ref 8–27)
BUN/CREAT SERPL: 14 (ref 10–24)
CALCIUM SERPL-MCNC: 9.5 MG/DL (ref 8.6–10.2)
CHLORIDE SERPL-SCNC: 103 MMOL/L (ref 96–106)
CO2 SERPL-SCNC: 21 MMOL/L (ref 20–29)
CREAT SERPL-MCNC: 1.42 MG/DL (ref 0.76–1.27)
EOSINOPHIL # BLD AUTO: 0.1 X10E3/UL (ref 0–0.4)
EOSINOPHIL NFR BLD AUTO: 1 %
ERYTHROCYTE [DISTWIDTH] IN BLOOD BY AUTOMATED COUNT: 14.3 % (ref 11.6–15.4)
EST. GFR  (NO RACE VARIABLE): 56 ML/MIN/1.73
GLUCOSE SERPL-MCNC: 152 MG/DL (ref 70–99)
HCT VFR BLD AUTO: 36.9 % (ref 37.5–51)
HGB BLD-MCNC: 11.6 G/DL (ref 13–17.7)
IMM GRANULOCYTES # BLD AUTO: 0.1 X10E3/UL (ref 0–0.1)
IMM GRANULOCYTES NFR BLD AUTO: 1 %
LYMPHOCYTES # BLD AUTO: 1.3 X10E3/UL (ref 0.7–3.1)
LYMPHOCYTES NFR BLD AUTO: 21 %
MAGNESIUM SERPL-MCNC: 1.5 MG/DL (ref 1.6–2.3)
MCH RBC QN AUTO: 29.2 PG (ref 26.6–33)
MCHC RBC AUTO-ENTMCNC: 31.4 G/DL (ref 31.5–35.7)
MCV RBC AUTO: 93 FL (ref 79–97)
MONOCYTES # BLD AUTO: 0.2 X10E3/UL (ref 0.1–0.9)
MONOCYTES NFR BLD AUTO: 3 %
NEUTROPHILS # BLD AUTO: 4.8 X10E3/UL (ref 1.4–7)
NEUTROPHILS NFR BLD AUTO: 73 %
NRBC BLD AUTO-RTO: 1 % (ref 0–0)
PHOSPHATE SERPL-MCNC: 2.4 MG/DL (ref 2.8–4.1)
PLATELET # BLD AUTO: 301 X10E3/UL (ref 150–450)
POTASSIUM SERPL-SCNC: 5.8 MMOL/L (ref 3.5–5.2)
PROT SERPL-MCNC: 6.7 G/DL (ref 6–8.5)
RBC # BLD AUTO: 3.97 X10E6/UL (ref 4.14–5.8)
SODIUM SERPL-SCNC: 137 MMOL/L (ref 134–144)
SPECIMEN STATUS REPORT: NORMAL
TACROLIMUS BLD LC/MS/MS-MCNC: 11 NG/ML (ref 2–20)
WBC # BLD AUTO: 6.5 X10E3/UL (ref 3.4–10.8)

## 2023-11-30 NOTE — TELEPHONE ENCOUNTER
----- Message from Williams Vasquez MD sent at 11/30/2023 10:34 AM CST -----  Sure I heard had some vouchers for lokelma or veltassa, if not available, let proceed with kayexalate 30 gm po every 4 hrs x 2 doses and repeat a K tomorrow  ----- Message -----  From: Tri Mujica RN  Sent: 11/30/2023   9:56 AM CST  To: Williams Vasquez MD    He is unable to get his lokelma--his PA is pending--do you want to write him kayexalate.

## 2023-11-30 NOTE — PROGRESS NOTES
Lower prograf to 3 mg PO bid   D/c KPN  Increase lokelma to 10 gm tid   Lets repeat a Potassium on Saturday if possible, otherwise repeat K tomorrow

## 2023-11-30 NOTE — TELEPHONE ENCOUNTER
Unable to locate a pharmacy local to patient that has Kayexalate in stock. [facility] pharmacy able to order kayexalate for tomorrow. Verbal order given to [facility] pharmacy for Kayexalate 30gm Q4hr x 2 doses. Sent detailed message to patient regarding this plan. Will plan to repeat potassium level on Saturday after patient has been able to take doses of Kayexalate. Advised patient to be aware of signs of hyperkalemia and to go to ED immediately if he experiences this.    ----- Message from Tri Mujica RN sent at 11/30/2023  1:28 PM CST -----    ----- Message -----  From: Williams Vasquez MD  Sent: 11/30/2023  10:35 AM CST  To: Tri Mujica RN    Sure I heard had some vouchers for lokelma or veltassa, if not available, let proceed with kayexalate 30 gm po every 4 hrs x 2 doses and repeat a K tomorrow  ----- Message -----  From: Tri Mujica RN  Sent: 11/30/2023   9:56 AM CST  To: Williams Vasquez MD    He is unable to get his lokelma--his PA is pending--do you want to write him kayexalate.

## 2023-11-30 NOTE — TELEPHONE ENCOUNTER
----- Message from Tri Mujica RN sent at 11/30/2023  1:01 PM CST -----    ----- Message -----  From: Williams Vasquez MD  Sent: 11/30/2023   7:40 AM CST  To: [facility] Post-Kidney Transplant Clinical    Lower prograf to 3 mg PO bid   D/c KPN  Increase lokelma to 10 gm tid   Lets repeat a Potassium on Saturday if possible, otherwise repeat K tomorrow

## 2023-11-30 NOTE — TELEPHONE ENCOUNTER
----- Message from Tri Mujica RN sent at 11/30/2023  1:01 PM CST -----    ----- Message -----  From: Williams Vasquez MD  Sent: 11/30/2023   7:40 AM CST  To: [facility] Post-Kidney Transplant Clinical    Lower prograf to 3 mg PO bid   D/c KPN  Increase lokelma to 10 gm tid   Lets repeat a Potassium on Saturday if possible, otherwise repeat K tomorrow

## 2023-12-01 ENCOUNTER — PATIENT MESSAGE (OUTPATIENT)
Dept: TRANSPLANT | Facility: CLINIC | Age: 63
End: 2023-12-01
Payer: COMMERCIAL

## 2023-12-01 NOTE — PROGRESS NOTES
Kidney Post-Transplant Assessment    Referring Physician: Jannet Farias  Current Nephrologist: Jannet Farias    ORGAN: LEFT KIDNEY  Donor Type: living  PHS Increased Risk: no  Cold Ischemia: 44 mins  Induction Medications: thymoglobulin    Subjective:     CC:  Reassessment of renal allograft function and management of chronic immunosuppression.    HPI:  Mr. Hernandez is a 63 y.o. year old White male with history of CKD secondary to IgA nephropathy and DM2 who received a living kidney transplant on 10/12/23 (Thymo induction, CMV ++).  His most recent creatinine is 1.42. He takes mycophenolate mofetil, prednisone, and tacrolimus for maintenance immunosuppression. His post transplant course has been complicated by perinephric hematoma requiring surgical evacuation (POD1).    Had KEYA drain for low grade urine leak from the decapsulated kidney parenchyma which was finally removed 11/15.    Last K 5.8, lokelma in process of PA. Had issues getting kayexalate prescription but was able to get it and took doses Saturday and Sunday.     Feels great without complaints. Staying hydrated, no problems with urination. BP at goal, no peripheral edema. Tolerating IS without issues, no diarrhea or vomiting.     Current Outpatient Medications   Medication Sig Dispense Refill    atorvastatin (LIPITOR) 10 MG tablet Take 1 tablet (10 mg total) by mouth once daily. 30 tablet 11    atovaquone (MEPRON) 750 mg/5 mL Susp oral liquid Take 10 mLs (1,500 mg total) by mouth once daily. 300 mL 5    bisacodyL (DULCOLAX) 5 mg EC tablet Take 1 tablet (5 mg total) by mouth daily as needed for Constipation. 30 tablet 0    calcitRIOL (ROCALTROL) 0.5 MCG Cap Take 1 capsule (0.5 mcg total) by mouth once daily. 30 capsule 11    carvediloL (COREG) 12.5 MG tablet Take 1 tablet (12.5 mg total) by mouth 2 (two) times daily. HOLD SBP <130, HR <60 60 tablet 11    clonazePAM (KLONOPIN) 1 MG tablet Take 1 mg by mouth nightly as needed.      DEXCOM G7 SENSOR  "Lady 1 Device by Misc.(Non-Drug; Combo Route) route every 10 days. 3 each 11    docusate sodium (COLACE) 100 MG capsule Take 1 capsule (100 mg total) by mouth 2 (two) times daily as needed for Constipation. 30 capsule 0    famotidine (PEPCID) 20 MG tablet Take 1 tablet (20 mg total) by mouth every evening. 30 tablet 0    insulin glargine-yfgn (SEMGLEE,INSULIN GLARG-YFGN,PEN) 100 unit/mL (3 mL) InPn Inject 8 Units into the skin once daily. 3 mL 11    insulin lispro (HUMALOG KWIKPEN INSULIN) 100 unit/mL pen Inject 6 Units into the skin 3 (three) times daily with meals. PLUS CORRECTION SCALE (MAX DAILY DOSE 30 UNITS) 15 mL 5    k phos di & mono-sod phos mono (PHOSPHA 250 NEUTRAL) 250 mg Tab Take 2 tablets by mouth 2 (two) times a day. 60 tablet 3    ketoconazole (NIZORAL) 200 mg Tab Take ½ tablet (100 mg total) by mouth once daily. 30 tablet 11    magnesium oxide (MAG-OX) 400 mg (241.3 mg magnesium) tablet Take 2 tablets (800 mg total) by mouth once daily. 60 tablet 11    multivitamin Tab Take 1 tablet by mouth once daily. 30 tablet 11    mycophenolate (CELLCEPT) 250 mg Cap Take 4 capsules (1,000 mg total) by mouth 2 (two) times daily. 240 capsule 11    NIFEdipine (PROCARDIA-XL) 30 MG (OSM) 24 hr tablet Take 1 tablet (30 mg total) by mouth once daily. HOLD SBP <140 30 tablet 11    pen needle, diabetic 32 gauge x 5/32" Ndle Use 1 pen needle to inject insulin 4 (four) times daily. 100 each 11    predniSONE (DELTASONE) 5 MG tablet Take by mouth daily: 10/15 to 10/21 20 mg, 10/22 to 10/28 15 mg, 10/29 to 11/4 10 mg, 11/5/23 take 5mg daily indefinitely. DO NOT STOP 70 tablet 11    sodium zirconium cyclosilicate (LOKELMA) 10 gram packet Take 1 packet (10 g total) by mouth once daily. Mix entire contents of packet(s) into drinking glass containing 3 tablespoons of water; stir well and drink immediately. Add water and repeat until no powder remains to receive entire dose. 30 packet 11    tacrolimus (PROGRAF) 1 MG Cap Take 4 " "capsules (4 mg total) by mouth every morning AND 4 capsules (4 mg total) every evening. 480 capsule 11    valGANciclovir (VALCYTE) 450 mg Tab Take 2 tablets (900 mg total) by mouth once daily. STOP: 1/13/24 60 tablet 2     No current facility-administered medications for this visit.       Past Medical History:   Diagnosis Date    Anxiety     Cancer     Skin Cancer    Diabetes mellitus     Diabetes mellitus, type 2     Disorder of kidney and ureter     Hyperlipidemia     Hypertension      Review of Systems   Constitutional:  Positive for fatigue. Negative for activity change, appetite change and fever.   HENT:  Negative for congestion, mouth sores and sore throat.    Eyes:  Negative for visual disturbance.   Respiratory:  Negative for cough, chest tightness and shortness of breath.    Cardiovascular:  Negative for chest pain, palpitations and leg swelling.   Gastrointestinal:  Negative for abdominal distention, abdominal pain, constipation, diarrhea and nausea.   Genitourinary:  Negative for difficulty urinating, frequency and hematuria.   Musculoskeletal:  Negative for arthralgias and gait problem.   Skin:  Negative for wound.   Allergic/Immunologic: Positive for immunocompromised state. Negative for environmental allergies and food allergies.   Neurological:  Negative for dizziness, weakness and numbness.   Psychiatric/Behavioral:  Negative for sleep disturbance. The patient is not nervous/anxious.        Objective:   Blood pressure 130/71, pulse 81, temperature 97.3 °F (36.3 °C), temperature source Temporal, resp. rate 16, height 5' 10" (1.778 m), weight 75.6 kg (166 lb 10.7 oz), SpO2 100 %.body mass index is 23.91 kg/m².    Physical Exam  Vitals and nursing note reviewed.   Constitutional:       Appearance: Normal appearance.   HENT:      Head: Normocephalic.   Cardiovascular:      Rate and Rhythm: Normal rate and regular rhythm.      Heart sounds: Normal heart sounds.   Pulmonary:      Effort: Pulmonary effort " is normal.      Breath sounds: Normal breath sounds.   Abdominal:      General: Bowel sounds are normal. There is no distension.      Palpations: Abdomen is soft.      Tenderness: There is no abdominal tenderness.   Musculoskeletal:         General: Normal range of motion.   Skin:     General: Skin is warm and dry.   Neurological:      General: No focal deficit present.      Mental Status: He is alert.   Psychiatric:         Behavior: Behavior normal.         Labs:  Lab Results   Component Value Date    WBC 6.5 11/27/2023    HGB 11.6 (L) 11/27/2023    HCT 36.9 (L) 11/27/2023     12/04/2023    K 5.5 (H) 12/04/2023     12/04/2023    CO2 25 12/04/2023    BUN 25 (H) 12/04/2023    CREATININE 1.5 (H) 12/04/2023    EGFRNORACEVR 52.6 (A) 12/04/2023    CALCIUM 9.7 12/04/2023    PHOS 2.3 (L) 12/04/2023    MG 1.5 (L) 11/27/2023    ALBUMIN 4.0 12/04/2023    AST 14 11/27/2023    ALT 15 11/27/2023    UTPCR 0.29 (H) 11/06/2023    .0 (H) 10/12/2023    .0 (H) 10/12/2023    TACROLIMUS 11.0 11/27/2023       Labs were reviewed with the patient    Assessment:     1. S/P kidney transplant    2. Long-term use of immunosuppressant medication    3. IgA nephropathy    4. Renovascular hypertension    5. At risk for opportunistic infections      Plan:   Hyperkalemia: s/p Kayexalate 30gm Q4hr x 2 doses. Repeat K level pending        1. Immunosuppression: Prograf trough 11.0. Continue Prograf 3/3 (dose decreased 11/30/2023), Ketoconazole 100 mg QD to augment prograf levels, MMF 1000 mg BID, and Prednisone 5 mg QD. Will continue to monitor for drug toxicities    2. Allograft Function: stable, continue good hydration  - history of CKD secondary to IgA nephropathy and DM2 s/p  living kidney transplant on 10/12/23 (Thymo induction, CMV ++).    - post transplant course has been complicated by perinephric hematoma requiring surgical evacuation (POD1).  Lab Results   Component Value Date    CREATININE 1.5 (H) 12/04/2023       Latest Reference Range & Units POD 46,  Kidney-Post 1 Year  11/27/23 07:59   eGFR >59 mL/min/1.73 56 (L)       3. Hypertension management: advise low salt diet and home BP monitoring    Coreg 12.5 mg BID, Nifedipine 30 mg QD      4. Metabolic Bone Disease/Secondary Hyperparathyroidism:  Calcitriol 0.5 mcg QD, MagOx 800 mg QD,  mg BID  Lab Results   Component Value Date    .0 (H) 10/12/2023    .0 (H) 10/12/2023    CALCIUM 9.7 12/04/2023    PHOS 2.3 (L) 12/04/2023         5. Electrolytes:  Will monitor /guidelines  Hyperkalemia: s/p Kayexalate 30gm Q4hr x 2 doses. Repeat K level pending  Lab Results   Component Value Date     12/04/2023    K 5.5 (H) 12/04/2023     12/04/2023    CO2 25 12/04/2023       6. Anemia: stable, no need for intervention  Lab Results   Component Value Date    WBC 6.5 11/27/2023    HGB 11.6 (L) 11/27/2023    HCT 36.9 (L) 11/27/2023    MCV 93 11/27/2023     11/27/2023         7. Cytopenias: no significant cytopenias. Medicine list reviewed including potential causes of drug-induced cytopenias    8.  Proteinuria: continue p/c ratio as per guidelines   UPC 11/6/2023: 0.29    9. BK virus infection screening:  will continue to monitor per guidelines  BK PCR 11/13/2023: not detected    10. Weight education: provided during the clinic visit   Body mass index is 23.91 kg/m².     11.Patient safety education regarding immunosuppression including prophylaxis posttransplant for CMV, PCP : Education provided about vaccination and prevention of infections     PCP ppx: mepron STOP 4/13/24  CMV ppx: valcyte STOP 1/13/24         Follow-up:   Clinic: return to transplant clinic weekly for the first month after transplant; every 2 weeks during months 2-3; then at 6-, 9-, 12-, 18-, 24-, and 36- months post-transplant to reassess for complications from immunosuppression toxicity and monitor for rejection.  Annually thereafter.    Labs: since patient remains at high risk for  rejection and drug-related complications that warrant close monitoring, labs will be ordered as follows: continue twice weekly CBC, renal panel, and drug level for first month; then same labs once weekly through 3rd month post-transplant.  Urine for UA and protein/creatinine ratio monthly.  Serum BK - PCR at 1-, 3-, 6-, 9-, 12-, 18-, 24-, 36-, 48-, and 60 months post-transplant.  Hepatic panel at 1-, 2-, 3-, 6-, 9-, 12-, 18-, 24-, and 36- months post-transplant.    Annika Colon NP       Education:   Material provided to the patient.  Patient reminded to call with any health changes since these can be early signs of significant complications.  Also, I advised the patient to be sure any new medications or changes of old medications are discussed with either a pharmacist or physician knowledgeable with transplant to avoid rejection/drug toxicity related to significant drug interactions.    Patient advised that it is recommended that all transplanted patients, and their close contacts and household members receive Covid vaccination.

## 2023-12-04 ENCOUNTER — PATIENT MESSAGE (OUTPATIENT)
Dept: TRANSPLANT | Facility: CLINIC | Age: 63
End: 2023-12-04

## 2023-12-04 ENCOUNTER — OFFICE VISIT (OUTPATIENT)
Dept: TRANSPLANT | Facility: CLINIC | Age: 63
End: 2023-12-04
Payer: COMMERCIAL

## 2023-12-04 ENCOUNTER — INFUSION (OUTPATIENT)
Dept: INFECTIOUS DISEASES | Facility: HOSPITAL | Age: 63
End: 2023-12-04
Payer: COMMERCIAL

## 2023-12-04 VITALS
HEIGHT: 70 IN | TEMPERATURE: 97 F | HEART RATE: 81 BPM | SYSTOLIC BLOOD PRESSURE: 130 MMHG | WEIGHT: 166.69 LBS | RESPIRATION RATE: 16 BRPM | BODY MASS INDEX: 23.86 KG/M2 | OXYGEN SATURATION: 100 % | DIASTOLIC BLOOD PRESSURE: 71 MMHG

## 2023-12-04 VITALS
HEART RATE: 82 BPM | TEMPERATURE: 98 F | HEIGHT: 70 IN | RESPIRATION RATE: 20 BRPM | SYSTOLIC BLOOD PRESSURE: 165 MMHG | DIASTOLIC BLOOD PRESSURE: 88 MMHG | BODY MASS INDEX: 23.81 KG/M2 | WEIGHT: 166.31 LBS

## 2023-12-04 DIAGNOSIS — Z94.0 S/P KIDNEY TRANSPLANT: Primary | ICD-10-CM

## 2023-12-04 DIAGNOSIS — I15.0 RENOVASCULAR HYPERTENSION: ICD-10-CM

## 2023-12-04 DIAGNOSIS — E87.5 HYPERKALEMIA: Primary | ICD-10-CM

## 2023-12-04 DIAGNOSIS — N02.B9 IGA NEPHROPATHY: ICD-10-CM

## 2023-12-04 DIAGNOSIS — Z79.60 LONG-TERM USE OF IMMUNOSUPPRESSANT MEDICATION: ICD-10-CM

## 2023-12-04 DIAGNOSIS — Z94.0 S/P KIDNEY TRANSPLANT: ICD-10-CM

## 2023-12-04 DIAGNOSIS — D63.8 ANEMIA, CHRONIC DISEASE: ICD-10-CM

## 2023-12-04 DIAGNOSIS — D62 ACUTE BLOOD LOSS ANEMIA: ICD-10-CM

## 2023-12-04 DIAGNOSIS — Z91.89 AT RISK FOR OPPORTUNISTIC INFECTIONS: ICD-10-CM

## 2023-12-04 PROCEDURE — 3044F HG A1C LEVEL LT 7.0%: CPT | Mod: CPTII,S$GLB,, | Performed by: NURSE PRACTITIONER

## 2023-12-04 PROCEDURE — 96365 THER/PROPH/DIAG IV INF INIT: CPT

## 2023-12-04 PROCEDURE — 3075F SYST BP GE 130 - 139MM HG: CPT | Mod: CPTII,S$GLB,, | Performed by: NURSE PRACTITIONER

## 2023-12-04 PROCEDURE — 3078F PR MOST RECENT DIASTOLIC BLOOD PRESSURE < 80 MM HG: ICD-10-PCS | Mod: CPTII,S$GLB,, | Performed by: NURSE PRACTITIONER

## 2023-12-04 PROCEDURE — 1159F PR MEDICATION LIST DOCUMENTED IN MEDICAL RECORD: ICD-10-PCS | Mod: CPTII,S$GLB,, | Performed by: NURSE PRACTITIONER

## 2023-12-04 PROCEDURE — 3008F PR BODY MASS INDEX (BMI) DOCUMENTED: ICD-10-PCS | Mod: CPTII,S$GLB,, | Performed by: NURSE PRACTITIONER

## 2023-12-04 PROCEDURE — 3078F DIAST BP <80 MM HG: CPT | Mod: CPTII,S$GLB,, | Performed by: NURSE PRACTITIONER

## 2023-12-04 PROCEDURE — 99999 PR PBB SHADOW E&M-EST. PATIENT-LVL V: CPT | Mod: PBBFAC,,, | Performed by: NURSE PRACTITIONER

## 2023-12-04 PROCEDURE — 4010F ACE/ARB THERAPY RXD/TAKEN: CPT | Mod: CPTII,S$GLB,, | Performed by: NURSE PRACTITIONER

## 2023-12-04 PROCEDURE — 3044F PR MOST RECENT HEMOGLOBIN A1C LEVEL <7.0%: ICD-10-PCS | Mod: CPTII,S$GLB,, | Performed by: NURSE PRACTITIONER

## 2023-12-04 PROCEDURE — 3008F BODY MASS INDEX DOCD: CPT | Mod: CPTII,S$GLB,, | Performed by: NURSE PRACTITIONER

## 2023-12-04 PROCEDURE — 3066F PR DOCUMENTATION OF TREATMENT FOR NEPHROPATHY: ICD-10-PCS | Mod: CPTII,S$GLB,, | Performed by: NURSE PRACTITIONER

## 2023-12-04 PROCEDURE — 63600175 PHARM REV CODE 636 W HCPCS: Mod: JZ,JG | Performed by: INTERNAL MEDICINE

## 2023-12-04 PROCEDURE — 1159F MED LIST DOCD IN RCRD: CPT | Mod: CPTII,S$GLB,, | Performed by: NURSE PRACTITIONER

## 2023-12-04 PROCEDURE — 25000003 PHARM REV CODE 250: Performed by: INTERNAL MEDICINE

## 2023-12-04 PROCEDURE — 4010F PR ACE/ARB THEARPY RXD/TAKEN: ICD-10-PCS | Mod: CPTII,S$GLB,, | Performed by: NURSE PRACTITIONER

## 2023-12-04 PROCEDURE — 99215 PR OFFICE/OUTPT VISIT, EST, LEVL V, 40-54 MIN: ICD-10-PCS | Mod: S$GLB,,, | Performed by: NURSE PRACTITIONER

## 2023-12-04 PROCEDURE — 3066F NEPHROPATHY DOC TX: CPT | Mod: CPTII,S$GLB,, | Performed by: NURSE PRACTITIONER

## 2023-12-04 PROCEDURE — 99215 OFFICE O/P EST HI 40 MIN: CPT | Mod: S$GLB,,, | Performed by: NURSE PRACTITIONER

## 2023-12-04 PROCEDURE — 3075F PR MOST RECENT SYSTOLIC BLOOD PRESS GE 130-139MM HG: ICD-10-PCS | Mod: CPTII,S$GLB,, | Performed by: NURSE PRACTITIONER

## 2023-12-04 PROCEDURE — 99999 PR PBB SHADOW E&M-EST. PATIENT-LVL V: ICD-10-PCS | Mod: PBBFAC,,, | Performed by: NURSE PRACTITIONER

## 2023-12-04 RX ORDER — SODIUM CHLORIDE 0.9 % (FLUSH) 0.9 %
10 SYRINGE (ML) INJECTION
Status: DISCONTINUED | OUTPATIENT
Start: 2023-12-04 | End: 2023-12-04 | Stop reason: HOSPADM

## 2023-12-04 RX ORDER — TACROLIMUS 1 MG/1
3 CAPSULE ORAL EVERY 12 HOURS
Qty: 180 CAPSULE | Refills: 11 | Status: SHIPPED | OUTPATIENT
Start: 2023-12-04 | End: 2023-12-14 | Stop reason: DRUGHIGH

## 2023-12-04 RX ORDER — EPINEPHRINE 0.3 MG/.3ML
0.3 INJECTION SUBCUTANEOUS ONCE AS NEEDED
OUTPATIENT
Start: 2023-12-04

## 2023-12-04 RX ORDER — HEPARIN 100 UNIT/ML
500 SYRINGE INTRAVENOUS
OUTPATIENT
Start: 2023-12-04

## 2023-12-04 RX ORDER — DIPHENHYDRAMINE HYDROCHLORIDE 50 MG/ML
50 INJECTION INTRAMUSCULAR; INTRAVENOUS ONCE AS NEEDED
OUTPATIENT
Start: 2023-12-04

## 2023-12-04 RX ORDER — SODIUM CHLORIDE 0.9 % (FLUSH) 0.9 %
10 SYRINGE (ML) INJECTION
OUTPATIENT
Start: 2023-12-04

## 2023-12-04 RX ORDER — SODIUM POLYSTYRENE SULFONATE 4.1 MEQ/G
30 POWDER, FOR SUSPENSION ORAL; RECTAL DAILY
Qty: 454 G | Refills: 1 | Status: SHIPPED | OUTPATIENT
Start: 2023-12-04 | End: 2023-12-27 | Stop reason: SDUPTHER

## 2023-12-04 RX ADMIN — FERUMOXYTOL 510 MG: 510 INJECTION INTRAVENOUS at 01:12

## 2023-12-04 NOTE — PLAN OF CARE
Patient counseled on infection prevention by excellent hand hygiene - verbalized understanding  Patient counseled on monitoring and reporting fatigue to provider - verbalized understanding

## 2023-12-04 NOTE — LETTER
December 4, 2023        Jannet Farias  [address] [place] [address]  Phone: [phone]  Fax: [phone]             [facility] 1st Fl  [address]  [facility] [address]  Phone: [phone]   Patient: Heath Hernandez   MR Number: [id number]   YOB: 1960   Date of Visit: 12/4/2023       Dear Dr. Jannet Farias    Thank you for referring Heath Hernandez to me for evaluation. Attached you will find relevant portions of my assessment and plan of care.    If you have questions, please do not hesitate to call me. I look forward to following Heath Hernandez along with you.    Sincerely,    Annika Colon NP    Enclosure    If you would like to receive this communication electronically, please contact [email or web address] or [phone] to request One Public Link access.    One Public Link is a tool which provides read-only access to select patient information with whom you have a relationship. Its easy to use and provides real time access to review your patients record including encounter summaries, notes, results, and demographic information.    If you feel you have received this communication in error or would no longer like to receive these types of communications, please e-mail [email or web address]

## 2023-12-04 NOTE — TELEPHONE ENCOUNTER
D/w ayo pharmacist, who is looking into veltessa. FK adjusted to 3/3 per order Thursday. Kayexelate being sent from pharm D. ---- Message from Gloria Turner MD sent at 12/4/2023 11:48 AM CST -----  So please remove lokelma  Kayexalate 30 gram daily X 3 doses   Pharm D to review veltassa coverage by his insurance    ----- Message -----  From: Tri Mujica RN  Sent: 12/4/2023  11:44 AM CST  To: Gloria Turner MD    We are not able to get lokelma, insurance has denied it.

## 2023-12-04 NOTE — TELEPHONE ENCOUNTER
----- Message from Gloria Turner MD sent at 12/4/2023 11:34 AM CST -----  Hyperkalemia: lokelma to 10 gram BID X 3 days then 10 gram daily. Check K on Thursday. Low K diet. More fluid intake.

## 2023-12-04 NOTE — PROGRESS NOTES
Patient arrived to infusion suite for Feraheme 1/2 infusion. Patient tolerated infusion, without difficulty. Informed and advised patient to stay in the clinic  for at least 1 hour post infusion. Patient stated acknowledge and stated that he cannot stay that long, and that he needs to leave after his infusion done. Patient stated he will wait 15 minutes and that's the max he can do for post observation. Explain the risk and benefits to the patient. Patient verbalize understanding.    Patient stated he lives far, ([place], [place]) and mentioned that he did have a phone conversation about this with his doctor and nurse this morning, and they were thinking about cancelling his next appointment.     Limited head-to-toe assessment due to privacy issues and visit reason though the opportunity was given for patient to express any concerns.

## 2023-12-09 LAB
BUN SERPL-MCNC: 20 MG/DL (ref 8–27)
BUN/CREAT SERPL: 14 (ref 10–24)
CALCIUM SERPL-MCNC: 9.5 MG/DL (ref 8.6–10.2)
CHLORIDE SERPL-SCNC: 106 MMOL/L (ref 96–106)
CO2 SERPL-SCNC: 22 MMOL/L (ref 20–29)
CREAT SERPL-MCNC: 1.47 MG/DL (ref 0.76–1.27)
EST. GFR  (NO RACE VARIABLE): 54 ML/MIN/1.73
GLUCOSE SERPL-MCNC: 66 MG/DL (ref 70–99)
POTASSIUM SERPL-SCNC: 5.2 MMOL/L (ref 3.5–5.2)
SODIUM SERPL-SCNC: 142 MMOL/L (ref 134–144)

## 2023-12-11 ENCOUNTER — PATIENT MESSAGE (OUTPATIENT)
Dept: TRANSPLANT | Facility: CLINIC | Age: 63
End: 2023-12-11
Payer: COMMERCIAL

## 2023-12-12 ENCOUNTER — DOCUMENTATION ONLY (OUTPATIENT)
Dept: TRANSPLANT | Facility: CLINIC | Age: 63
End: 2023-12-12
Payer: COMMERCIAL

## 2023-12-12 LAB
EXT ALBUMIN: 4.4 (ref 3.9–4.9)
EXT ALKALINE PHOSPHATASE: 83 (ref 44–121)
EXT ALLOSURE: ABNORMAL
EXT ALT: 18 (ref 0–44)
EXT AMYLASE: ABNORMAL
EXT ANC: ABNORMAL
EXT AST: 18 (ref 0–40)
EXT BACTERIA UA: ABNORMAL
EXT BANDS%: ABNORMAL
EXT BILIRUBIN DIRECT: ABNORMAL
EXT BILIRUBIN TOTAL: 0.2 (ref 0–1.2)
EXT BK VIRUS DNA QN PCR: ABNORMAL
EXT BUN: 18 (ref 8–27)
EXT C PEPTIDE: ABNORMAL
EXT CALCIUM: 9.5 (ref 8.6–10.2)
EXT CHLORIDE: 106 (ref 96–106)
EXT CHOLESTEROL: ABNORMAL
EXT CMV DNA QUANT. BY PCR: ABNORMAL
EXT CO2: 24 (ref 20–29)
EXT CREATININE UA: 25.3
EXT CREATININE: 1.5 MG/DL (ref 0.76–1.27)
EXT CYCLOSPORINE LVL: ABNORMAL
EXT EBV DNA BY PCR: ABNORMAL
EXT EBV IGG: ABNORMAL
EXT EGFR NO RACE VARIABLE: 53
EXT EOSINOPHIL%: 0
EXT FERRITIN: ABNORMAL
EXT GFR MDRD AF AMER: ABNORMAL
EXT GFR MDRD NON AF AMER: ABNORMAL
EXT GLUCOSE UA: ABNORMAL
EXT GLUCOSE: 111 (ref 70–99)
EXT HBV DNA QUANT PCR: ABNORMAL
EXT HCV QUANT: ABNORMAL
EXT HDL: ABNORMAL
EXT HEMATOCRIT: 37.9 (ref 37.5–51)
EXT HEMOGLOBIN A1C: ABNORMAL
EXT HEMOGLOBIN: 12 (ref 13–17.7)
EXT HIV RNA QUANT PCR: ABNORMAL
EXT IMMUNKNOW (STIMULATED): ABNORMAL
EXT IRON SATURATION: ABNORMAL
EXT LDH, TOTAL: ABNORMAL
EXT LDL CHOLESTEROL: ABNORMAL
EXT LEFLUNOMIDE METABOLITE: ABNORMAL
EXT LIPASE: ABNORMAL
EXT LYMPH%: 12
EXT MAGNESIUM: 1.5 (ref 1.6–2.3)
EXT MONOCYTES%: 3
EXT NITRITES UA: ABNORMAL
EXT PHOSPHORUS: ABNORMAL
EXT PLATELETS: 277 (ref 150–450)
EXT POTASSIUM: 5.1 (ref 3.5–5.2)
EXT PROT/CREAT RATIO UR: ABNORMAL
EXT PROTEIN TOTAL: 6.7 (ref 6–8.5)
EXT PROTEIN UA: ABNORMAL
EXT PTH, INTACT: ABNORMAL
EXT RBC UA: ABNORMAL
EXT SEGS%: 83
EXT SERUM IRON: ABNORMAL
EXT SIROLIMUS LVL: ABNORMAL
EXT SODIUM: 143 MMOL/L (ref 134–144)
EXT TACROLIMUS LVL: ABNORMAL
EXT TIBC: ABNORMAL
EXT TRIGLYCERIDES: ABNORMAL
EXT URIC ACID: ABNORMAL
EXT URINE CULTURE: ABNORMAL
EXT URINE PROTEIN: <4
EXT VIT D 25 HYDROXY: ABNORMAL
EXT WBC UA: ABNORMAL
EXT WBC: 7.3 (ref 3.4–10.8)
PARVOVIRUS B19 DNA BY PCR: ABNORMAL
QUANTIFERON GOLD TB: ABNORMAL

## 2023-12-13 ENCOUNTER — DOCUMENTATION ONLY (OUTPATIENT)
Dept: TRANSPLANT | Facility: CLINIC | Age: 63
End: 2023-12-13

## 2023-12-13 LAB
EXT ALBUMIN: 4.4 (ref 3.9–4.9)
EXT ALKALINE PHOSPHATASE: ABNORMAL
EXT ALLOSURE: ABNORMAL
EXT ALT: ABNORMAL
EXT AMYLASE: ABNORMAL
EXT ANC: ABNORMAL
EXT AST: ABNORMAL
EXT BACTERIA UA: ABNORMAL
EXT BANDS%: ABNORMAL
EXT BILIRUBIN DIRECT: ABNORMAL
EXT BILIRUBIN TOTAL: ABNORMAL
EXT BK VIRUS DNA QN PCR: NEGATIVE
EXT BUN: 18 (ref 8–27)
EXT C PEPTIDE: ABNORMAL
EXT CALCIUM: 9.5 (ref 8.6–10.2)
EXT CHLORIDE: 106 (ref 96–106)
EXT CHOLESTEROL: ABNORMAL
EXT CMV DNA QUANT. BY PCR: ABNORMAL
EXT CO2: 24 (ref 20–29)
EXT CREATININE UA: ABNORMAL
EXT CREATININE: 1.5 MG/DL (ref 0.76–1.27)
EXT CYCLOSPORINE LVL: ABNORMAL
EXT EBV DNA BY PCR: ABNORMAL
EXT EBV IGG: ABNORMAL
EXT EGFR NO RACE VARIABLE: 53
EXT EOSINOPHIL%: 0
EXT FERRITIN: ABNORMAL
EXT GFR MDRD AF AMER: ABNORMAL
EXT GFR MDRD NON AF AMER: ABNORMAL
EXT GLUCOSE UA: NEGATIVE
EXT GLUCOSE: 111 (ref 70–99)
EXT HBV DNA QUANT PCR: ABNORMAL
EXT HCV QUANT: ABNORMAL
EXT HDL: ABNORMAL
EXT HEMATOCRIT: 37.9 (ref 37.5–51)
EXT HEMOGLOBIN A1C: ABNORMAL
EXT HEMOGLOBIN: 12 (ref 13–17.7)
EXT HIV RNA QUANT PCR: ABNORMAL
EXT IMMUNKNOW (STIMULATED): ABNORMAL
EXT IRON SATURATION: ABNORMAL
EXT LDH, TOTAL: ABNORMAL
EXT LDL CHOLESTEROL: ABNORMAL
EXT LEFLUNOMIDE METABOLITE: ABNORMAL
EXT LIPASE: ABNORMAL
EXT LYMPH%: 0.9
EXT MAGNESIUM: 1.5 (ref 1.6–2.3)
EXT MONOCYTES%: 3
EXT NITRITES UA: NEGATIVE
EXT PHOSPHORUS: 2.6 (ref 2.8–4.1)
EXT PLATELETS: 277 (ref 150–450)
EXT POTASSIUM: 5.1 (ref 3.5–5.2)
EXT PROT/CREAT RATIO UR: ABNORMAL
EXT PROTEIN TOTAL: 6.7 (ref 6–8.5)
EXT PROTEIN UA: NEGATIVE
EXT PTH, INTACT: ABNORMAL
EXT RBC UA: ABNORMAL
EXT SEGS%: 6 (ref 1.4–7)
EXT SERUM IRON: ABNORMAL
EXT SIROLIMUS LVL: ABNORMAL
EXT SODIUM: 143 MMOL/L (ref 134–144)
EXT TACROLIMUS LVL: 14.8
EXT TIBC: ABNORMAL
EXT TRIGLYCERIDES: ABNORMAL
EXT URIC ACID: ABNORMAL
EXT URINE CULTURE: ABNORMAL
EXT URINE PROTEIN: ABNORMAL
EXT VIT D 25 HYDROXY: ABNORMAL
EXT WBC UA: ABNORMAL
EXT WBC: 7.3 (ref 3.4–10.8)
PARVOVIRUS B19 DNA BY PCR: ABNORMAL
PHOSPHATE SERPL-MCNC: 2.6 MG/DL (ref 2.8–4.1)
QUANTIFERON GOLD TB: ABNORMAL
SPECIMEN STATUS REPORT: NORMAL

## 2023-12-14 DIAGNOSIS — Z79.60 LONG-TERM USE OF IMMUNOSUPPRESSANT MEDICATION: ICD-10-CM

## 2023-12-14 DIAGNOSIS — Z94.0 S/P KIDNEY TRANSPLANT: ICD-10-CM

## 2023-12-14 LAB
ALBUMIN SERPL-MCNC: 4.4 G/DL (ref 3.9–4.9)
ALBUMIN/GLOB SERPL: 1.9 {RATIO} (ref 1.2–2.2)
ALP SERPL-CCNC: 83 IU/L (ref 44–121)
ALT SERPL-CCNC: 18 IU/L (ref 0–44)
APPEARANCE UR: CLEAR
AST SERPL-CCNC: 18 IU/L (ref 0–40)
BACTERIA #/AREA URNS HPF: NORMAL /[HPF]
BASOPHILS # BLD AUTO: 0.1 X10E3/UL (ref 0–0.2)
BASOPHILS NFR BLD AUTO: 1 %
BILIRUB DIRECT SERPL-MCNC: <0.1 MG/DL (ref 0–0.4)
BILIRUB SERPL-MCNC: 0.2 MG/DL (ref 0–1.2)
BILIRUB UR QL STRIP: NEGATIVE
BKV DNA SPEC NAA+PROBE-ACNC: NEGATIVE IU/ML
BUN SERPL-MCNC: 18 MG/DL (ref 8–27)
BUN/CREAT SERPL: 12 (ref 10–24)
CALCIUM SERPL-MCNC: 9.5 MG/DL (ref 8.6–10.2)
CASTS URNS QL MICRO: NORMAL /LPF
CHLORIDE SERPL-SCNC: 106 MMOL/L (ref 96–106)
CO2 SERPL-SCNC: 24 MMOL/L (ref 20–29)
COLOR UR: YELLOW
CREAT SERPL-MCNC: 1.5 MG/DL (ref 0.76–1.27)
CREAT UR-MCNC: 25.3 MG/DL
EOSINOPHIL # BLD AUTO: 0 X10E3/UL (ref 0–0.4)
EOSINOPHIL NFR BLD AUTO: 0 %
EPI CELLS #/AREA URNS HPF: NORMAL /HPF (ref 0–10)
ERYTHROCYTE [DISTWIDTH] IN BLOOD BY AUTOMATED COUNT: 14.7 % (ref 11.6–15.4)
EST. GFR  (NO RACE VARIABLE): 53 ML/MIN/1.73
GLOBULIN SER CALC-MCNC: 2.3 G/DL (ref 1.5–4.5)
GLUCOSE SERPL-MCNC: 111 MG/DL (ref 70–99)
GLUCOSE UR QL STRIP: NEGATIVE
HCT VFR BLD AUTO: 37.9 % (ref 37.5–51)
HGB BLD-MCNC: 12 G/DL (ref 13–17.7)
HGB UR QL STRIP: NEGATIVE
IMM GRANULOCYTES # BLD AUTO: 0.1 X10E3/UL (ref 0–0.1)
IMM GRANULOCYTES NFR BLD AUTO: 1 %
KETONES UR QL STRIP: NEGATIVE
LEUKOCYTE ESTERASE UR QL STRIP: NEGATIVE
LYMPHOCYTES # BLD AUTO: 0.9 X10E3/UL (ref 0.7–3.1)
LYMPHOCYTES NFR BLD AUTO: 12 %
MAGNESIUM SERPL-MCNC: 1.5 MG/DL (ref 1.6–2.3)
MCH RBC QN AUTO: 29.6 PG (ref 26.6–33)
MCHC RBC AUTO-ENTMCNC: 31.7 G/DL (ref 31.5–35.7)
MCV RBC AUTO: 94 FL (ref 79–97)
MICRO URNS: NORMAL
MICRO URNS: NORMAL
MONOCYTES # BLD AUTO: 0.3 X10E3/UL (ref 0.1–0.9)
MONOCYTES NFR BLD AUTO: 3 %
NEUTROPHILS # BLD AUTO: 6 X10E3/UL (ref 1.4–7)
NEUTROPHILS NFR BLD AUTO: 83 %
NITRITE UR QL STRIP: NEGATIVE
PH UR STRIP: 6.5 [PH] (ref 5–7.5)
PLATELET # BLD AUTO: 277 X10E3/UL (ref 150–450)
POTASSIUM SERPL-SCNC: 5.1 MMOL/L (ref 3.5–5.2)
PROT SERPL-MCNC: 6.7 G/DL (ref 6–8.5)
PROT UR QL STRIP: NEGATIVE
PROT UR-MCNC: <4 MG/DL
PROT/CREAT UR: ABNORMAL MG/G CREAT (ref 0–200)
RBC # BLD AUTO: 4.05 X10E6/UL (ref 4.14–5.8)
RBC #/AREA URNS HPF: NORMAL /HPF (ref 0–2)
SODIUM SERPL-SCNC: 143 MMOL/L (ref 134–144)
SP GR UR STRIP: 1.01 (ref 1–1.03)
TACROLIMUS BLD LC/MS/MS-MCNC: 14.8 NG/ML (ref 2–20)
UROBILINOGEN UR STRIP-MCNC: 0.2 MG/DL (ref 0.2–1)
WBC # BLD AUTO: 7.3 X10E3/UL (ref 3.4–10.8)
WBC #/AREA URNS HPF: NORMAL /HPF (ref 0–5)

## 2023-12-14 RX ORDER — TACROLIMUS 1 MG/1
2 CAPSULE ORAL EVERY 12 HOURS
Qty: 120 CAPSULE | Refills: 11 | Status: SHIPPED | OUTPATIENT
Start: 2023-12-14 | End: 2023-12-22 | Stop reason: DRUGHIGH

## 2023-12-14 NOTE — PROGRESS NOTES
High Na 143: Continue good hydration and sugar control.    K is stable at 5.1. on low K diet.   Tac to 2 mg BID if it is a true level. Check tac level on Monday

## 2023-12-14 NOTE — TELEPHONE ENCOUNTER
Message sent. Labs Monday.     ----- Message from Gloria Turner MD sent at 12/14/2023 12:24 PM CST -----  High Na 143: Continue good hydration and sugar control.    K is stable at 5.1. on low K diet.   Tac to 2 mg BID if it is a true level. Check tac level on Monday

## 2023-12-17 NOTE — PROGRESS NOTES
The patient location is: Home  The chief complaint leading to consultation is: Reassessment of renal allograft function and management of chronic immunosuppression.    Visit type: audiovisual    Face to Face time with patient: 20  30 minutes of total time spent on the encounter, which includes face to face time and non-face to face time preparing to see the patient (eg, review of tests), Obtaining and/or reviewing separately obtained history, Documenting clinical information in the electronic or other health record, Independently interpreting results (not separately reported) and communicating results to the patient/family/caregiver, or Care coordination (not separately reported).         Each patient to whom he or she provides medical services by telemedicine is:  (1) informed of the relationship between the physician and patient and the respective role of any other health care provider with respect to management of the patient; and (2) notified that he or she may decline to receive medical services by telemedicine and may withdraw from such care at any time.    Notes:    Kidney Post-Transplant Assessment    Referring Physician: Jannet Farias  Current Nephrologist: Jannet Farias    ORGAN: LEFT KIDNEY  Donor Type: living  PHS Increased Risk: no  Cold Ischemia: 44 mins  Induction Medications: thymoglobulin    Subjective:     CC:  Reassessment of renal allograft function and management of chronic immunosuppression.    HPI:  Mr. Hernandez is a 63 y.o. year old White male with history of CKD secondary to IgA nephropathy and DM2 who received a living kidney transplant on 10/12/23 (Thymo induction, CMV ++).  His most recent creatinine is 1.5 . He takes mycophenolate mofetil, prednisone, and tacrolimus for maintenance immunosuppression.       His post transplant course   - perinephric hematoma requiring surgical evacuation (POD1).  - Had KEYA drain for low grade urine leak from the decapsulated kidney parenchyma which  was finally removed 11/15.  - Hyperkalemia: improving       Interval History: Feels great without complaints.  Pending insurance approval for lokelma. Staying hydrated, no problems with urination. BP at goal in AM and higher in PM.  no peripheral edema. Tolerating IS without issues, no diarrhea or vomiting. he denies any chest pain, shortness of breath, ENT symptoms, nausea/vomiting, dysuria, frequency, urgency, or pain over the allograft.      Current Outpatient Medications   Medication Sig Dispense Refill    atorvastatin (LIPITOR) 10 MG tablet Take 1 tablet (10 mg total) by mouth once daily. 30 tablet 11    atovaquone (MEPRON) 750 mg/5 mL Susp oral liquid Take 10 mLs (1,500 mg total) by mouth once daily. 300 mL 5    bisacodyL (DULCOLAX) 5 mg EC tablet Take 1 tablet (5 mg total) by mouth daily as needed for Constipation. 30 tablet 0    calcitRIOL (ROCALTROL) 0.5 MCG Cap Take 1 capsule (0.5 mcg total) by mouth once daily. 30 capsule 11    carvediloL (COREG) 12.5 MG tablet Take 1 tablet (12.5 mg total) by mouth 2 (two) times daily. HOLD SBP <130, HR <60 60 tablet 11    clonazePAM (KLONOPIN) 1 MG tablet Take 1 mg by mouth nightly as needed.      DEXCOM G7 SENSOR Lady 1 Device by Misc.(Non-Drug; Combo Route) route every 10 days. 3 each 11    docusate sodium (COLACE) 100 MG capsule Take 1 capsule (100 mg total) by mouth 2 (two) times daily as needed for Constipation. 30 capsule 0    famotidine (PEPCID) 20 MG tablet Take 1 tablet (20 mg total) by mouth every evening. 30 tablet 0    insulin glargine-yfgn (SEMGLEE,INSULIN GLARG-YFGN,PEN) 100 unit/mL (3 mL) InPn Inject 8 Units into the skin once daily. 3 mL 11    insulin lispro (HUMALOG KWIKPEN INSULIN) 100 unit/mL pen Inject 6 Units into the skin 3 (three) times daily with meals. PLUS CORRECTION SCALE (MAX DAILY DOSE 30 UNITS) 15 mL 5    k phos di & mono-sod phos mono (PHOSPHA 250 NEUTRAL) 250 mg Tab Take 2 tablets by mouth 2 (two) times a day. 60 tablet 3    ketoconazole  "(NIZORAL) 200 mg Tab Take ½ tablet (100 mg total) by mouth once daily. 30 tablet 11    magnesium oxide (MAG-OX) 400 mg (241.3 mg magnesium) tablet Take 2 tablets (800 mg total) by mouth once daily. 60 tablet 11    multivitamin Tab Take 1 tablet by mouth once daily. 30 tablet 11    mycophenolate (CELLCEPT) 250 mg Cap Take 4 capsules (1,000 mg total) by mouth 2 (two) times daily. 240 capsule 11    NIFEdipine (PROCARDIA-XL) 30 MG (OSM) 24 hr tablet Take 1 tablet (30 mg total) by mouth once daily. HOLD SBP <140 30 tablet 11    patiromer calcium sorbitex (VELTASSA) 16.8 gram PwPk Take 1 packet (16.8 g total) by mouth once daily. Do not take within 3 hours of other medications; 30 packet 5    pen needle, diabetic 32 gauge x 5/32" Ndle Use 1 pen needle to inject insulin 4 (four) times daily. 100 each 11    predniSONE (DELTASONE) 5 MG tablet Take by mouth daily: 10/15 to 10/21 20 mg, 10/22 to 10/28 15 mg, 10/29 to 11/4 10 mg, 11/5/23 take 5mg daily indefinitely. DO NOT STOP 70 tablet 11    sodium polystyrene (KAYEXALATE) powder Mix 8 leveled scoops (30 g) with water and take by mouth once daily for 3 days  then as directed by the transplant clinic 454 g 1    tacrolimus (PROGRAF) 1 MG Cap Take 2 capsules (2 mg total) by mouth every 12 (twelve) hours. 120 capsule 11    valGANciclovir (VALCYTE) 450 mg Tab Take 2 tablets (900 mg total) by mouth once daily. STOP: 1/13/24 60 tablet 2     No current facility-administered medications for this visit.       Past Medical History:   Diagnosis Date    Anxiety     Cancer     Skin Cancer    Diabetes mellitus     Diabetes mellitus, type 2     Disorder of kidney and ureter     Hyperlipidemia     Hypertension      Review of Systems   Constitutional:  Positive for fatigue. Negative for activity change, appetite change and fever.   HENT:  Negative for congestion, mouth sores and sore throat.    Eyes:  Negative for visual disturbance.   Respiratory:  Negative for cough, chest tightness and " shortness of breath.    Cardiovascular:  Negative for chest pain, palpitations and leg swelling.   Gastrointestinal:  Negative for abdominal distention, abdominal pain, constipation, diarrhea and nausea.   Genitourinary:  Negative for difficulty urinating, frequency and hematuria.   Musculoskeletal:  Negative for arthralgias and gait problem.   Skin:  Negative for wound.   Allergic/Immunologic: Positive for immunocompromised state. Negative for environmental allergies and food allergies.   Neurological:  Negative for dizziness, weakness and numbness.   Psychiatric/Behavioral:  Negative for sleep disturbance. The patient is not nervous/anxious.          Labs:  Lab Results   Component Value Date    WBC 7.3 12/11/2023    HGB 12.0 (L) 12/11/2023    HCT 37.9 12/11/2023     12/11/2023    K 5.1 12/11/2023     12/11/2023    CO2 24 12/11/2023    BUN 18 12/11/2023    CREATININE 1.50 (H) 12/11/2023    EGFRNORACEVR 53 (L) 12/11/2023    CALCIUM 9.5 12/11/2023    PHOS 2.6 (L) 12/11/2023    MG 1.5 (L) 12/11/2023    ALBUMIN 4.4 12/11/2023    AST 18 12/11/2023    ALT 18 12/11/2023    UTPCR 0.29 (H) 11/06/2023    .0 (H) 10/12/2023    .0 (H) 10/12/2023    TACROLIMUS 14.8 12/11/2023       Labs were reviewed with the patient    Assessment:     No diagnosis found.    Plan:             # Allograft Function: Cr 1.5.   - history of CKD secondary to IgA nephropathy and DM2 s/p  living kidney transplant on 10/12/23 (Thymo induction, CMV ++).    - post transplant course has been complicated by perinephric hematoma requiring surgical evacuation (POD1).  - last Cr 1.5  - no proteinuria       # Immunosuppression:   - Continue Prograf 2/2, Ketoconazole 100 mg QD, MMF 1000 mg BID, and Prednisone 5 mg QD.   - Will continue to monitor for drug toxicities    # Hypertension management:   - advise low salt diet and home BP monitoring    - Coreg 12.5 mg BID, Nifedipine 30 mg QD      # Metabolic Bone Disease/Secondary  Hyperparathyroidism:  Calcitriol 0.5 mcg QD, MagOx 800 mg QD,  mg BID    # BK virus infection screening:    will continue to monitor per guidelines  BK PCR 11/13/2023: not detected    # prophylaxis posttransplant for CMV, PCP    PCP ppx: mepron STOP 4/13/24  CMV ppx: valcyte STOP 1/13/24           Gloria Turner MD       Education:   Material provided to the patient.  Patient reminded to call with any health changes since these can be early signs of significant complications.  Also, I advised the patient to be sure any new medications or changes of old medications are discussed with either a pharmacist or physician knowledgeable with transplant to avoid rejection/drug toxicity related to significant drug interactions.    Patient advised that it is recommended that all transplanted patients, and their close contacts and household members receive Covid vaccination.

## 2023-12-18 ENCOUNTER — OFFICE VISIT (OUTPATIENT)
Dept: TRANSPLANT | Facility: CLINIC | Age: 63
End: 2023-12-18
Payer: COMMERCIAL

## 2023-12-18 DIAGNOSIS — Z94.0 S/P KIDNEY TRANSPLANT: Primary | ICD-10-CM

## 2023-12-18 DIAGNOSIS — E11.22 TYPE 2 DIABETES MELLITUS WITH CHRONIC KIDNEY DISEASE ON CHRONIC DIALYSIS, WITH LONG-TERM CURRENT USE OF INSULIN: ICD-10-CM

## 2023-12-18 DIAGNOSIS — Z99.2 TYPE 2 DIABETES MELLITUS WITH CHRONIC KIDNEY DISEASE ON CHRONIC DIALYSIS, WITH LONG-TERM CURRENT USE OF INSULIN: ICD-10-CM

## 2023-12-18 DIAGNOSIS — N18.6 TYPE 2 DIABETES MELLITUS WITH CHRONIC KIDNEY DISEASE ON CHRONIC DIALYSIS, WITH LONG-TERM CURRENT USE OF INSULIN: ICD-10-CM

## 2023-12-18 DIAGNOSIS — Z79.4 TYPE 2 DIABETES MELLITUS WITH CHRONIC KIDNEY DISEASE ON CHRONIC DIALYSIS, WITH LONG-TERM CURRENT USE OF INSULIN: ICD-10-CM

## 2023-12-18 DIAGNOSIS — N02.B9 IGA NEPHROPATHY: ICD-10-CM

## 2023-12-18 DIAGNOSIS — Z79.60 LONG-TERM USE OF IMMUNOSUPPRESSANT MEDICATION: ICD-10-CM

## 2023-12-18 PROCEDURE — 99215 OFFICE O/P EST HI 40 MIN: CPT | Mod: 95,,, | Performed by: INTERNAL MEDICINE

## 2023-12-18 PROCEDURE — 3044F PR MOST RECENT HEMOGLOBIN A1C LEVEL <7.0%: ICD-10-PCS | Mod: CPTII,95,, | Performed by: INTERNAL MEDICINE

## 2023-12-18 PROCEDURE — 4010F PR ACE/ARB THEARPY RXD/TAKEN: ICD-10-PCS | Mod: CPTII,95,, | Performed by: INTERNAL MEDICINE

## 2023-12-18 PROCEDURE — 3044F HG A1C LEVEL LT 7.0%: CPT | Mod: CPTII,95,, | Performed by: INTERNAL MEDICINE

## 2023-12-18 PROCEDURE — 3066F PR DOCUMENTATION OF TREATMENT FOR NEPHROPATHY: ICD-10-PCS | Mod: CPTII,95,, | Performed by: INTERNAL MEDICINE

## 2023-12-18 PROCEDURE — 3066F NEPHROPATHY DOC TX: CPT | Mod: CPTII,95,, | Performed by: INTERNAL MEDICINE

## 2023-12-18 PROCEDURE — 99215 PR OFFICE/OUTPT VISIT, EST, LEVL V, 40-54 MIN: ICD-10-PCS | Mod: 95,,, | Performed by: INTERNAL MEDICINE

## 2023-12-18 PROCEDURE — 4010F ACE/ARB THERAPY RXD/TAKEN: CPT | Mod: CPTII,95,, | Performed by: INTERNAL MEDICINE

## 2023-12-18 NOTE — LETTER
December 18, 2023        Jannet Farias  [address] [place] [address]  Phone: [phone]  Fax: [phone]             [facility]- Transplant 1st Fl  [address]  [facility] [address]  Phone: [phone]   Patient: Heath Hernandez   MR Number: [id number]   YOB: 1960   Date of Visit: 12/18/2023       Dear Dr. Jannet Farias    Thank you for referring Heath Hernandez to me for evaluation. Attached you will find relevant portions of my assessment and plan of care.    If you have questions, please do not hesitate to call me. I look forward to following Heath Hernandez along with you.    Sincerely,    Gloria Turner MD    Enclosure    If you would like to receive this communication electronically, please contact [email or web address] or [phone] to request [employer] access.    [employer] is a tool which provides read-only access to select patient information with whom you have a relationship. Its easy to use and provides real time access to review your patients record including encounter summaries, notes, results, and demographic information.    If you feel you have received this communication in error or would no longer like to receive these types of communications, please e-mail [email or web address]

## 2023-12-19 ENCOUNTER — DOCUMENTATION ONLY (OUTPATIENT)
Dept: TRANSPLANT | Facility: CLINIC | Age: 63
End: 2023-12-19
Payer: COMMERCIAL

## 2023-12-19 LAB
EXT ALBUMIN: ABNORMAL
EXT ALKALINE PHOSPHATASE: ABNORMAL
EXT ALLOSURE: ABNORMAL
EXT ALT: ABNORMAL
EXT AMYLASE: ABNORMAL
EXT ANC: ABNORMAL
EXT AST: ABNORMAL
EXT BACTERIA UA: ABNORMAL
EXT BANDS%: ABNORMAL
EXT BILIRUBIN DIRECT: ABNORMAL
EXT BILIRUBIN TOTAL: ABNORMAL
EXT BK VIRUS DNA QN PCR: ABNORMAL
EXT BUN: 17 (ref 8–27)
EXT C PEPTIDE: ABNORMAL
EXT CALCIUM: 9.9 (ref 8.6–10.2)
EXT CHLORIDE: 103 (ref 96–106)
EXT CHOLESTEROL: ABNORMAL
EXT CMV DNA QUANT. BY PCR: ABNORMAL
EXT CO2: 22 (ref 20–29)
EXT CREATININE UA: ABNORMAL
EXT CREATININE: 1.38 MG/DL (ref 0.76–1.27)
EXT CYCLOSPORINE LVL: ABNORMAL
EXT EBV DNA BY PCR: ABNORMAL
EXT EBV IGG: ABNORMAL
EXT EGFR NO RACE VARIABLE: 58
EXT EOSINOPHIL%: 1
EXT FERRITIN: ABNORMAL
EXT GFR MDRD AF AMER: ABNORMAL
EXT GFR MDRD NON AF AMER: ABNORMAL
EXT GLUCOSE UA: ABNORMAL
EXT GLUCOSE: 155 (ref 70–99)
EXT HBV DNA QUANT PCR: ABNORMAL
EXT HCV QUANT: ABNORMAL
EXT HDL: ABNORMAL
EXT HEMATOCRIT: 38.6 (ref 37.5–51)
EXT HEMOGLOBIN A1C: ABNORMAL
EXT HEMOGLOBIN: 12.4 (ref 13–17.7)
EXT HIV RNA QUANT PCR: ABNORMAL
EXT IMMUNKNOW (STIMULATED): ABNORMAL
EXT IRON SATURATION: ABNORMAL
EXT LDH, TOTAL: ABNORMAL
EXT LDL CHOLESTEROL: ABNORMAL
EXT LEFLUNOMIDE METABOLITE: ABNORMAL
EXT LIPASE: ABNORMAL
EXT LYMPH%: 23
EXT MAGNESIUM: 1.5 (ref 1.6–2.3)
EXT MONOCYTES%: 4
EXT NITRITES UA: ABNORMAL
EXT PHOSPHORUS: 3 (ref 2.8–4.1)
EXT PLATELETS: 241 (ref 150–450)
EXT POTASSIUM: 4.7 (ref 3.5–5.2)
EXT PROT/CREAT RATIO UR: ABNORMAL
EXT PROTEIN TOTAL: ABNORMAL
EXT PROTEIN UA: ABNORMAL
EXT PTH, INTACT: ABNORMAL
EXT RBC UA: ABNORMAL
EXT SEGS%: 70
EXT SERUM IRON: ABNORMAL
EXT SIROLIMUS LVL: ABNORMAL
EXT SODIUM: 142 MMOL/L (ref 134–144)
EXT TACROLIMUS LVL: 5.8
EXT TIBC: ABNORMAL
EXT TRIGLYCERIDES: ABNORMAL
EXT URIC ACID: ABNORMAL
EXT URINE CULTURE: ABNORMAL
EXT URINE PROTEIN: ABNORMAL
EXT VIT D 25 HYDROXY: ABNORMAL
EXT WBC UA: ABNORMAL
EXT WBC: 6.8 (ref 3.4–10.8)
PARVOVIRUS B19 DNA BY PCR: ABNORMAL
QUANTIFERON GOLD TB: ABNORMAL

## 2023-12-22 ENCOUNTER — PATIENT MESSAGE (OUTPATIENT)
Dept: TRANSPLANT | Facility: CLINIC | Age: 63
End: 2023-12-22
Payer: COMMERCIAL

## 2023-12-22 DIAGNOSIS — Z94.0 S/P KIDNEY TRANSPLANT: ICD-10-CM

## 2023-12-22 DIAGNOSIS — Z79.60 LONG-TERM USE OF IMMUNOSUPPRESSANT MEDICATION: ICD-10-CM

## 2023-12-22 LAB
BASOPHILS # BLD AUTO: 0.1 X10E3/UL (ref 0–0.2)
BASOPHILS NFR BLD AUTO: 1 %
BUN SERPL-MCNC: 17 MG/DL (ref 8–27)
BUN/CREAT SERPL: 12 (ref 10–24)
CALCIUM SERPL-MCNC: 9.9 MG/DL (ref 8.6–10.2)
CHLORIDE SERPL-SCNC: 103 MMOL/L (ref 96–106)
CO2 SERPL-SCNC: 22 MMOL/L (ref 20–29)
CREAT SERPL-MCNC: 1.38 MG/DL (ref 0.76–1.27)
EOSINOPHIL # BLD AUTO: 0.1 X10E3/UL (ref 0–0.4)
EOSINOPHIL NFR BLD AUTO: 1 %
ERYTHROCYTE [DISTWIDTH] IN BLOOD BY AUTOMATED COUNT: 15 % (ref 11.6–15.4)
EST. GFR  (NO RACE VARIABLE): 58 ML/MIN/1.73
GLUCOSE SERPL-MCNC: 155 MG/DL (ref 70–99)
HCT VFR BLD AUTO: 38.6 % (ref 37.5–51)
HGB BLD-MCNC: 12.4 G/DL (ref 13–17.7)
IMM GRANULOCYTES # BLD AUTO: 0.1 X10E3/UL (ref 0–0.1)
IMM GRANULOCYTES NFR BLD AUTO: 1 %
LYMPHOCYTES # BLD AUTO: 1.6 X10E3/UL (ref 0.7–3.1)
LYMPHOCYTES NFR BLD AUTO: 23 %
MAGNESIUM SERPL-MCNC: 1.5 MG/DL (ref 1.6–2.3)
MCH RBC QN AUTO: 30.2 PG (ref 26.6–33)
MCHC RBC AUTO-ENTMCNC: 32.1 G/DL (ref 31.5–35.7)
MCV RBC AUTO: 94 FL (ref 79–97)
MONOCYTES # BLD AUTO: 0.3 X10E3/UL (ref 0.1–0.9)
MONOCYTES NFR BLD AUTO: 4 %
NEUTROPHILS # BLD AUTO: 4.7 X10E3/UL (ref 1.4–7)
NEUTROPHILS NFR BLD AUTO: 70 %
PHOSPHATE SERPL-MCNC: 3 MG/DL (ref 2.8–4.1)
PLATELET # BLD AUTO: 241 X10E3/UL (ref 150–450)
POTASSIUM SERPL-SCNC: 4.7 MMOL/L (ref 3.5–5.2)
RBC # BLD AUTO: 4.11 X10E6/UL (ref 4.14–5.8)
SODIUM SERPL-SCNC: 142 MMOL/L (ref 134–144)
TACROLIMUS BLD LC/MS/MS-MCNC: 5.8 NG/ML (ref 2–20)
WBC # BLD AUTO: 6.8 X10E3/UL (ref 3.4–10.8)

## 2023-12-22 RX ORDER — TACROLIMUS 1 MG/1
CAPSULE ORAL
Qty: 150 CAPSULE | Refills: 11 | Status: SHIPPED | OUTPATIENT
Start: 2023-12-22 | End: 2024-12-22

## 2023-12-22 NOTE — TELEPHONE ENCOUNTER
Message sent. ----- Message from Williams Vasquez MD sent at 12/22/2023  8:36 AM CST -----  Please increase prograf to 3/2 and repeat a tacro level in 1 week

## 2023-12-26 DIAGNOSIS — E87.5 HYPERKALEMIA: ICD-10-CM

## 2023-12-27 ENCOUNTER — PATIENT MESSAGE (OUTPATIENT)
Dept: TRANSPLANT | Facility: CLINIC | Age: 63
End: 2023-12-27
Payer: COMMERCIAL

## 2023-12-27 DIAGNOSIS — E87.5 HYPERKALEMIA: ICD-10-CM

## 2023-12-27 RX ORDER — SODIUM POLYSTYRENE SULFONATE 4.1 MEQ/G
30 POWDER, FOR SUSPENSION ORAL; RECTAL DAILY
Qty: 908 G | Refills: 3 | Status: SHIPPED | OUTPATIENT
Start: 2023-12-27 | End: 2024-05-24 | Stop reason: SDUPTHER

## 2023-12-28 LAB
BASOPHILS # BLD AUTO: 0.1 X10E3/UL (ref 0–0.2)
BASOPHILS NFR BLD AUTO: 1 %
BUN SERPL-MCNC: 20 MG/DL (ref 8–27)
BUN/CREAT SERPL: 14 (ref 10–24)
CALCIUM SERPL-MCNC: 9.7 MG/DL (ref 8.6–10.2)
CHLORIDE SERPL-SCNC: 104 MMOL/L (ref 96–106)
CO2 SERPL-SCNC: 21 MMOL/L (ref 20–29)
CREAT SERPL-MCNC: 1.44 MG/DL (ref 0.76–1.27)
EOSINOPHIL # BLD AUTO: 0.1 X10E3/UL (ref 0–0.4)
EOSINOPHIL NFR BLD AUTO: 1 %
ERYTHROCYTE [DISTWIDTH] IN BLOOD BY AUTOMATED COUNT: 15.2 % (ref 11.6–15.4)
EST. GFR  (NO RACE VARIABLE): 55 ML/MIN/1.73
GLUCOSE SERPL-MCNC: 133 MG/DL (ref 70–99)
HCT VFR BLD AUTO: 40 % (ref 37.5–51)
HGB BLD-MCNC: 12.7 G/DL (ref 13–17.7)
IMM GRANULOCYTES # BLD AUTO: 0 X10E3/UL (ref 0–0.1)
IMM GRANULOCYTES NFR BLD AUTO: 1 %
LYMPHOCYTES # BLD AUTO: 1.4 X10E3/UL (ref 0.7–3.1)
LYMPHOCYTES NFR BLD AUTO: 22 %
MAGNESIUM SERPL-MCNC: 1.6 MG/DL (ref 1.6–2.3)
MCH RBC QN AUTO: 29.7 PG (ref 26.6–33)
MCHC RBC AUTO-ENTMCNC: 31.8 G/DL (ref 31.5–35.7)
MCV RBC AUTO: 94 FL (ref 79–97)
MONOCYTES # BLD AUTO: 0.3 X10E3/UL (ref 0.1–0.9)
MONOCYTES NFR BLD AUTO: 5 %
NEUTROPHILS # BLD AUTO: 4.5 X10E3/UL (ref 1.4–7)
NEUTROPHILS NFR BLD AUTO: 70 %
PHOSPHATE SERPL-MCNC: 3 MG/DL (ref 2.8–4.1)
PLATELET # BLD AUTO: 235 X10E3/UL (ref 150–450)
POTASSIUM SERPL-SCNC: 4.7 MMOL/L (ref 3.5–5.2)
RBC # BLD AUTO: 4.27 X10E6/UL (ref 4.14–5.8)
SODIUM SERPL-SCNC: 142 MMOL/L (ref 134–144)
TACROLIMUS BLD LC/MS/MS-MCNC: 8.4 NG/ML (ref 2–20)
WBC # BLD AUTO: 6.4 X10E3/UL (ref 3.4–10.8)

## 2024-01-04 ENCOUNTER — PATIENT MESSAGE (OUTPATIENT)
Dept: TRANSPLANT | Facility: CLINIC | Age: 64
End: 2024-01-04
Payer: COMMERCIAL

## 2024-01-04 ENCOUNTER — DOCUMENTATION ONLY (OUTPATIENT)
Dept: TRANSPLANT | Facility: CLINIC | Age: 64
End: 2024-01-04

## 2024-01-04 DIAGNOSIS — Z94.0 S/P KIDNEY TRANSPLANT: ICD-10-CM

## 2024-01-04 LAB
BASOPHILS # BLD AUTO: 0.1 X10E3/UL (ref 0–0.2)
BASOPHILS NFR BLD AUTO: 1 %
BUN SERPL-MCNC: 21 MG/DL (ref 8–27)
BUN/CREAT SERPL: 16 (ref 10–24)
CALCIUM SERPL-MCNC: 9.3 MG/DL (ref 8.6–10.2)
CHLORIDE SERPL-SCNC: 99 MMOL/L (ref 96–106)
CO2 SERPL-SCNC: 23 MMOL/L (ref 20–29)
CREAT SERPL-MCNC: 1.32 MG/DL (ref 0.76–1.27)
EOSINOPHIL # BLD AUTO: 0.1 X10E3/UL (ref 0–0.4)
EOSINOPHIL NFR BLD AUTO: 1 %
ERYTHROCYTE [DISTWIDTH] IN BLOOD BY AUTOMATED COUNT: 15.3 % (ref 11.6–15.4)
EST. GFR  (NO RACE VARIABLE): 61 ML/MIN/1.73
EXT ALBUMIN: ABNORMAL
EXT ALKALINE PHOSPHATASE: ABNORMAL
EXT ALLOSURE: ABNORMAL
EXT ALT: ABNORMAL
EXT AMYLASE: ABNORMAL
EXT ANC: ABNORMAL
EXT AST: ABNORMAL
EXT BACTERIA UA: ABNORMAL
EXT BANDS%: ABNORMAL
EXT BILIRUBIN DIRECT: ABNORMAL
EXT BILIRUBIN TOTAL: ABNORMAL
EXT BK VIRUS DNA QN PCR: ABNORMAL
EXT BUN: 21 (ref 8–27)
EXT C PEPTIDE: ABNORMAL
EXT CALCIUM: 9.3 (ref 8.6–10.2)
EXT CHLORIDE: 99 (ref 96–106)
EXT CHOLESTEROL: ABNORMAL
EXT CMV DNA QUANT. BY PCR: ABNORMAL
EXT CO2: 23 (ref 20–29)
EXT CREATININE UA: ABNORMAL
EXT CREATININE: 1.32 MG/DL (ref 0.76–1.27)
EXT CYCLOSPORINE LVL: ABNORMAL
EXT EBV DNA BY PCR: ABNORMAL
EXT EBV IGG: ABNORMAL
EXT EGFR NO RACE VARIABLE: 61
EXT EOSINOPHIL%: 1
EXT FERRITIN: ABNORMAL
EXT GFR MDRD AF AMER: ABNORMAL
EXT GFR MDRD NON AF AMER: ABNORMAL
EXT GLUCOSE UA: ABNORMAL
EXT GLUCOSE: 133 (ref 70–99)
EXT HBV DNA QUANT PCR: ABNORMAL
EXT HCV QUANT: ABNORMAL
EXT HDL: ABNORMAL
EXT HEMATOCRIT: 39 (ref 37.5–51)
EXT HEMOGLOBIN A1C: ABNORMAL
EXT HEMOGLOBIN: 12.8 (ref 13–17)
EXT HIV RNA QUANT PCR: ABNORMAL
EXT IMMUNKNOW (STIMULATED): ABNORMAL
EXT IRON SATURATION: ABNORMAL
EXT LDH, TOTAL: ABNORMAL
EXT LDL CHOLESTEROL: ABNORMAL
EXT LEFLUNOMIDE METABOLITE: ABNORMAL
EXT LIPASE: ABNORMAL
EXT LYMPH%: 21
EXT MAGNESIUM: 1.7 (ref 1.6–2.3)
EXT MONOCYTES%: 5
EXT NITRITES UA: ABNORMAL
EXT PHOSPHORUS: 3.2 (ref 2.8–4.1)
EXT PLATELETS: 234 (ref 150–450)
EXT POTASSIUM: 4.5 (ref 3.5–5.2)
EXT PROT/CREAT RATIO UR: ABNORMAL
EXT PROTEIN TOTAL: ABNORMAL
EXT PROTEIN UA: ABNORMAL
EXT PTH, INTACT: ABNORMAL
EXT RBC UA: ABNORMAL
EXT SEGS%: 71
EXT SERUM IRON: ABNORMAL
EXT SIROLIMUS LVL: ABNORMAL
EXT SODIUM: 135 MMOL/L (ref 134–144)
EXT TACROLIMUS LVL: 7.6
EXT TIBC: ABNORMAL
EXT TRIGLYCERIDES: ABNORMAL
EXT URIC ACID: ABNORMAL
EXT URINE CULTURE: ABNORMAL
EXT URINE PROTEIN: ABNORMAL
EXT VIT D 25 HYDROXY: ABNORMAL
EXT WBC UA: ABNORMAL
EXT WBC: 6.4 (ref 3.4–10.8)
GLUCOSE SERPL-MCNC: 133 MG/DL (ref 70–99)
HCT VFR BLD AUTO: 39 % (ref 37.5–51)
HGB BLD-MCNC: 12.8 G/DL (ref 13–17.7)
IMM GRANULOCYTES # BLD AUTO: 0.1 X10E3/UL (ref 0–0.1)
IMM GRANULOCYTES NFR BLD AUTO: 1 %
LYMPHOCYTES # BLD AUTO: 1.4 X10E3/UL (ref 0.7–3.1)
LYMPHOCYTES NFR BLD AUTO: 21 %
MAGNESIUM SERPL-MCNC: 1.7 MG/DL (ref 1.6–2.3)
MCH RBC QN AUTO: 30.5 PG (ref 26.6–33)
MCHC RBC AUTO-ENTMCNC: 32.8 G/DL (ref 31.5–35.7)
MCV RBC AUTO: 93 FL (ref 79–97)
MONOCYTES # BLD AUTO: 0.3 X10E3/UL (ref 0.1–0.9)
MONOCYTES NFR BLD AUTO: 5 %
NEUTROPHILS # BLD AUTO: 4.5 X10E3/UL (ref 1.4–7)
NEUTROPHILS NFR BLD AUTO: 71 %
PARVOVIRUS B19 DNA BY PCR: ABNORMAL
PHOSPHATE SERPL-MCNC: 3.2 MG/DL (ref 2.8–4.1)
PLATELET # BLD AUTO: 234 X10E3/UL (ref 150–450)
POTASSIUM SERPL-SCNC: 4.5 MMOL/L (ref 3.5–5.2)
QUANTIFERON GOLD TB: ABNORMAL
RBC # BLD AUTO: 4.19 X10E6/UL (ref 4.14–5.8)
SODIUM SERPL-SCNC: 135 MMOL/L (ref 134–144)
TACROLIMUS BLD LC/MS/MS-MCNC: 7.6 NG/ML (ref 2–20)
WBC # BLD AUTO: 6.4 X10E3/UL (ref 3.4–10.8)

## 2024-01-04 RX ORDER — NIFEDIPINE 30 MG/1
30 TABLET, EXTENDED RELEASE ORAL EVERY 12 HOURS
Qty: 60 TABLET | Refills: 11 | Status: SHIPPED | OUTPATIENT
Start: 2024-01-04 | End: 2025-01-03

## 2024-01-04 RX ORDER — INSULIN LISPRO 100 [IU]/ML
6 INJECTION, SOLUTION INTRAVENOUS; SUBCUTANEOUS
Qty: 15 ML | Refills: 2
Start: 2024-01-04 | End: 2024-01-09 | Stop reason: SDUPTHER

## 2024-01-07 NOTE — PROGRESS NOTES
The results reviewed, no change required. Topography done today to further evaluate astigmatism prior to cataract surgery.

## 2024-01-09 DIAGNOSIS — Z94.0 S/P KIDNEY TRANSPLANT: ICD-10-CM

## 2024-01-09 RX ORDER — INSULIN LISPRO 100 [IU]/ML
6 INJECTION, SOLUTION INTRAVENOUS; SUBCUTANEOUS
Qty: 15 ML | Refills: 2
Start: 2024-01-09

## 2024-01-11 ENCOUNTER — PATIENT MESSAGE (OUTPATIENT)
Dept: TRANSPLANT | Facility: CLINIC | Age: 64
End: 2024-01-11
Payer: COMMERCIAL

## 2024-01-11 LAB
BASOPHILS # BLD AUTO: 0.1 X10E3/UL (ref 0–0.2)
BASOPHILS NFR BLD AUTO: 1 %
BUN SERPL-MCNC: 20 MG/DL (ref 8–27)
BUN/CREAT SERPL: 16 (ref 10–24)
CALCIUM SERPL-MCNC: 9.8 MG/DL (ref 8.6–10.2)
CHLORIDE SERPL-SCNC: 101 MMOL/L (ref 96–106)
CO2 SERPL-SCNC: 23 MMOL/L (ref 20–29)
CREAT SERPL-MCNC: 1.25 MG/DL (ref 0.76–1.27)
EOSINOPHIL # BLD AUTO: 0.1 X10E3/UL (ref 0–0.4)
EOSINOPHIL NFR BLD AUTO: 1 %
ERYTHROCYTE [DISTWIDTH] IN BLOOD BY AUTOMATED COUNT: 15.1 % (ref 11.6–15.4)
EST. GFR  (NO RACE VARIABLE): 66 ML/MIN/1.73
GLUCOSE SERPL-MCNC: 164 MG/DL (ref 70–99)
HCT VFR BLD AUTO: 42.7 % (ref 37.5–51)
HGB BLD-MCNC: 13.7 G/DL (ref 13–17.7)
IMM GRANULOCYTES # BLD AUTO: 0.1 X10E3/UL (ref 0–0.1)
IMM GRANULOCYTES NFR BLD AUTO: 1 %
LYMPHOCYTES # BLD AUTO: 1.4 X10E3/UL (ref 0.7–3.1)
LYMPHOCYTES NFR BLD AUTO: 17 %
MAGNESIUM SERPL-MCNC: 1.5 MG/DL (ref 1.6–2.3)
MCH RBC QN AUTO: 29.7 PG (ref 26.6–33)
MCHC RBC AUTO-ENTMCNC: 32.1 G/DL (ref 31.5–35.7)
MCV RBC AUTO: 93 FL (ref 79–97)
MONOCYTES # BLD AUTO: 0.4 X10E3/UL (ref 0.1–0.9)
MONOCYTES NFR BLD AUTO: 4 %
NEUTROPHILS # BLD AUTO: 6.4 X10E3/UL (ref 1.4–7)
NEUTROPHILS NFR BLD AUTO: 76 %
PHOSPHATE SERPL-MCNC: 3 MG/DL (ref 2.8–4.1)
PLATELET # BLD AUTO: 258 X10E3/UL (ref 150–450)
POTASSIUM SERPL-SCNC: 5.6 MMOL/L (ref 3.5–5.2)
RBC # BLD AUTO: 4.61 X10E6/UL (ref 4.14–5.8)
SODIUM SERPL-SCNC: 137 MMOL/L (ref 134–144)
SPECIMEN STATUS REPORT: NORMAL
TACROLIMUS BLD LC/MS/MS-MCNC: 7.4 NG/ML (ref 2–20)
WBC # BLD AUTO: 8.4 X10E3/UL (ref 3.4–10.8)

## 2024-01-17 ENCOUNTER — DOCUMENTATION ONLY (OUTPATIENT)
Dept: TRANSPLANT | Facility: CLINIC | Age: 64
End: 2024-01-17

## 2024-01-17 LAB
APPEARANCE UR: CLEAR
BACTERIA #/AREA URNS HPF: NORMAL /[HPF]
BASOPHILS # BLD AUTO: 0.1 X10E3/UL (ref 0–0.2)
BASOPHILS NFR BLD AUTO: 1 %
BILIRUB UR QL STRIP: NEGATIVE
BKV DNA SPEC NAA+PROBE-ACNC: NEGATIVE IU/ML
BUN SERPL-MCNC: 23 MG/DL (ref 8–27)
BUN/CREAT SERPL: 18 (ref 10–24)
CALCIUM SERPL-MCNC: 9.7 MG/DL (ref 8.6–10.2)
CASTS URNS QL MICRO: NORMAL /LPF
CHLORIDE SERPL-SCNC: 102 MMOL/L (ref 96–106)
CO2 SERPL-SCNC: 23 MMOL/L (ref 20–29)
COLOR UR: YELLOW
CREAT SERPL-MCNC: 1.3 MG/DL (ref 0.76–1.27)
CREAT UR-MCNC: 20.8 MG/DL
EOSINOPHIL # BLD AUTO: 0.1 X10E3/UL (ref 0–0.4)
EOSINOPHIL NFR BLD AUTO: 1 %
EPI CELLS #/AREA URNS HPF: NORMAL /HPF (ref 0–10)
ERYTHROCYTE [DISTWIDTH] IN BLOOD BY AUTOMATED COUNT: 14.9 % (ref 11.6–15.4)
EST. GFR  (NO RACE VARIABLE): 63 ML/MIN/1.73
EXT ALBUMIN: ABNORMAL
EXT ALKALINE PHOSPHATASE: ABNORMAL
EXT ALLOSURE: ABNORMAL
EXT ALT: ABNORMAL
EXT AMYLASE: ABNORMAL
EXT ANC: ABNORMAL
EXT AST: ABNORMAL
EXT BACTERIA UA: ABNORMAL
EXT BANDS%: ABNORMAL
EXT BILIRUBIN DIRECT: ABNORMAL
EXT BILIRUBIN TOTAL: ABNORMAL
EXT BK VIRUS DNA QN PCR: NEGATIVE
EXT BUN: 23 (ref 8–27)
EXT C PEPTIDE: ABNORMAL
EXT CALCIUM: 9.7 (ref 8.6–10.2)
EXT CHLORIDE: 102 (ref 96–106)
EXT CHOLESTEROL: ABNORMAL
EXT CMV DNA QUANT. BY PCR: ABNORMAL
EXT CO2: 23 (ref 20–29)
EXT CREATININE UA: 20.8
EXT CREATININE: 1.3 MG/DL (ref 0.76–1.27)
EXT CYCLOSPORINE LVL: ABNORMAL
EXT EBV DNA BY PCR: ABNORMAL
EXT EBV IGG: ABNORMAL
EXT EGFR NO RACE VARIABLE: 63
EXT EOSINOPHIL%: 1
EXT FERRITIN: ABNORMAL
EXT GFR MDRD AF AMER: ABNORMAL
EXT GFR MDRD NON AF AMER: ABNORMAL
EXT GLUCOSE UA: NEGATIVE
EXT GLUCOSE: 151 (ref 70–99)
EXT HBV DNA QUANT PCR: ABNORMAL
EXT HCV QUANT: ABNORMAL
EXT HDL: ABNORMAL
EXT HEMATOCRIT: 42.8 (ref 37.5–51)
EXT HEMOGLOBIN A1C: ABNORMAL
EXT HEMOGLOBIN: 13.7 (ref 13–17.7)
EXT HIV RNA QUANT PCR: ABNORMAL
EXT IMMUNKNOW (STIMULATED): ABNORMAL
EXT IRON SATURATION: ABNORMAL
EXT LDH, TOTAL: ABNORMAL
EXT LDL CHOLESTEROL: ABNORMAL
EXT LEFLUNOMIDE METABOLITE: ABNORMAL
EXT LIPASE: ABNORMAL
EXT LYMPH%: 20
EXT MAGNESIUM: 1.6 (ref 1.6–2.3)
EXT MONOCYTES%: 4
EXT NITRITES UA: NEGATIVE
EXT PHOSPHORUS: 2.9 (ref 2.8–4.1)
EXT PLATELETS: 248 (ref 150–450)
EXT POTASSIUM: 5.4 (ref 3.5–5.2)
EXT PROT/CREAT RATIO UR: ABNORMAL
EXT PROTEIN TOTAL: ABNORMAL
EXT PROTEIN UA: NEGATIVE
EXT PTH, INTACT: ABNORMAL
EXT RBC UA: ABNORMAL
EXT SEGS%: 73
EXT SERUM IRON: ABNORMAL
EXT SIROLIMUS LVL: ABNORMAL
EXT SODIUM: 140 MMOL/L (ref 134–144)
EXT TACROLIMUS LVL: 8.5
EXT TIBC: ABNORMAL
EXT TRIGLYCERIDES: ABNORMAL
EXT URIC ACID: ABNORMAL
EXT URINE CULTURE: ABNORMAL
EXT URINE PROTEIN: <4
EXT VIT D 25 HYDROXY: ABNORMAL
EXT WBC UA: ABNORMAL
EXT WBC: 6.6 (ref 3.4–10.8)
GLUCOSE SERPL-MCNC: 151 MG/DL (ref 70–99)
GLUCOSE UR QL STRIP: NEGATIVE
HCT VFR BLD AUTO: 42.8 % (ref 37.5–51)
HGB BLD-MCNC: 13.7 G/DL (ref 13–17.7)
HGB UR QL STRIP: NEGATIVE
IMM GRANULOCYTES # BLD AUTO: 0 X10E3/UL (ref 0–0.1)
IMM GRANULOCYTES NFR BLD AUTO: 1 %
KETONES UR QL STRIP: NEGATIVE
LEUKOCYTE ESTERASE UR QL STRIP: NEGATIVE
LYMPHOCYTES # BLD AUTO: 1.4 X10E3/UL (ref 0.7–3.1)
LYMPHOCYTES NFR BLD AUTO: 20 %
MAGNESIUM SERPL-MCNC: 1.6 MG/DL (ref 1.6–2.3)
MCH RBC QN AUTO: 30.4 PG (ref 26.6–33)
MCHC RBC AUTO-ENTMCNC: 32 G/DL (ref 31.5–35.7)
MCV RBC AUTO: 95 FL (ref 79–97)
MICRO URNS: NORMAL
MICRO URNS: NORMAL
MONOCYTES # BLD AUTO: 0.2 X10E3/UL (ref 0.1–0.9)
MONOCYTES NFR BLD AUTO: 4 %
NEUTROPHILS # BLD AUTO: 4.8 X10E3/UL (ref 1.4–7)
NEUTROPHILS NFR BLD AUTO: 73 %
NITRITE UR QL STRIP: NEGATIVE
PARVOVIRUS B19 DNA BY PCR: ABNORMAL
PH UR STRIP: 6 [PH] (ref 5–7.5)
PHOSPHATE SERPL-MCNC: 2.9 MG/DL (ref 2.8–4.1)
PLATELET # BLD AUTO: 248 X10E3/UL (ref 150–450)
POTASSIUM SERPL-SCNC: 5.4 MMOL/L (ref 3.5–5.2)
PROT UR QL STRIP: NEGATIVE
PROT UR-MCNC: <4 MG/DL
PROT/CREAT UR: ABNORMAL MG/G CREAT (ref 0–200)
QUANTIFERON GOLD TB: ABNORMAL
RBC # BLD AUTO: 4.5 X10E6/UL (ref 4.14–5.8)
RBC #/AREA URNS HPF: NORMAL /HPF (ref 0–2)
SODIUM SERPL-SCNC: 140 MMOL/L (ref 134–144)
SP GR UR STRIP: 1.01 (ref 1–1.03)
TACROLIMUS BLD IA-MCNC: 8.5 NG/ML (ref 2–20)
UROBILINOGEN UR STRIP-MCNC: 0.2 MG/DL (ref 0.2–1)
WBC # BLD AUTO: 6.6 X10E3/UL (ref 3.4–10.8)
WBC #/AREA URNS HPF: NORMAL /HPF (ref 0–5)

## 2024-01-17 NOTE — PROGRESS NOTES
The patient location is: Home  The chief complaint leading to consultation is: Reassessment of renal allograft function and management of chronic immunosuppression.    Visit type: audiovisual    Face to Face time with patient: 20  30 minutes of total time spent on the encounter, which includes face to face time and non-face to face time preparing to see the patient (eg, review of tests), Obtaining and/or reviewing separately obtained history, Documenting clinical information in the electronic or other health record, Independently interpreting results (not separately reported) and communicating results to the patient/family/caregiver, or Care coordination (not separately reported).         Each patient to whom he or she provides medical services by telemedicine is:  (1) informed of the relationship between the physician and patient and the respective role of any other health care provider with respect to management of the patient; and (2) notified that he or she may decline to receive medical services by telemedicine and may withdraw from such care at any time.    Notes:    Kidney Post-Transplant Assessment    Referring Physician: Jannet Farias  Current Nephrologist: Jannet Farias    ORGAN: LEFT KIDNEY  Donor Type: living  PHS Increased Risk: no  Cold Ischemia: 44 mins  Induction Medications: thymoglobulin    Subjective:     CC:  Reassessment of renal allograft function and management of chronic immunosuppression.    HPI:  Mr. Hernandez is a 63 y.o. year old White male with history of CKD secondary to IgA nephropathy and DM2 who received a living kidney transplant on 10/12/23 (Thymo induction, CMV ++).  His most recent creatinine is 1.5 . He takes mycophenolate mofetil, prednisone, and tacrolimus for maintenance immunosuppression.       His post transplant course   - perinephric hematoma requiring surgical evacuation (POD1).  - Had KEYA drain for low grade urine leak from the decapsulated kidney parenchyma which  was finally removed 11/15.  - Hyperkalemia: improving       Interval History: Feels great without complaints.  His K still remains high. The insurance denied lokelma and veltassa. He is on kayexalate. Staying hydrated. BP at goal.  No peripheral edema. Tolerating IS without issues, no diarrhea or vomiting. he denies any chest pain, shortness of breath, ENT symptoms, nausea/vomiting, dysuria, frequency, urgency, or pain over the allograft.    All lab reviewed and his questions are answered   Current Outpatient Medications   Medication Sig Dispense Refill    atorvastatin (LIPITOR) 10 MG tablet Take 1 tablet (10 mg total) by mouth once daily. 30 tablet 11    atovaquone (MEPRON) 750 mg/5 mL Susp oral liquid Take 10 mLs (1,500 mg total) by mouth once daily. 300 mL 5    calcitRIOL (ROCALTROL) 0.5 MCG Cap Take 1 capsule (0.5 mcg total) by mouth once daily. 30 capsule 11    carvediloL (COREG) 12.5 MG tablet Take 1 tablet (12.5 mg total) by mouth 2 (two) times daily. HOLD SBP <130, HR <60 60 tablet 11    DEXCOM G7 SENSOR Lady 1 Device by Misc.(Non-Drug; Combo Route) route every 10 days. 3 each 11    famotidine (PEPCID) 20 MG tablet Take 1 tablet (20 mg total) by mouth every evening. 30 tablet 0    insulin glargine-yfgn (SEMGLEE,INSULIN GLARG-YFGN,PEN) 100 unit/mL (3 mL) InPn Inject 8 Units into the skin once daily. 3 mL 11    insulin lispro (HUMALOG KWIKPEN INSULIN) 100 unit/mL pen Inject 6 Units into the skin 3 (three) times daily with meals. PLUS CORRECTION SCALE (MAX DAILY DOSE 30 UNITS) 15 mL 2    k phos di & mono-sod phos mono (PHOSPHA 250 NEUTRAL) 250 mg Tab Take 2 tablets by mouth 2 (two) times a day. 60 tablet 3    ketoconazole (NIZORAL) 200 mg Tab Take ½ tablet (100 mg total) by mouth once daily. 30 tablet 11    magnesium oxide (MAG-OX) 400 mg (241.3 mg magnesium) tablet Take 2 tablets (800 mg total) by mouth once daily. 60 tablet 11    multivitamin Tab Take 1 tablet by mouth once daily. 30 tablet 11     "mycophenolate (CELLCEPT) 250 mg Cap Take 4 capsules (1,000 mg total) by mouth 2 (two) times daily. 240 capsule 11    NIFEdipine (PROCARDIA-XL) 30 MG (OSM) 24 hr tablet Take 1 tablet (30 mg total) by mouth every 12 (twelve) hours. HOLD if systolic blood pressure <130 60 tablet 11    pen needle, diabetic 32 gauge x 5/32" Ndle Use 1 pen needle to inject insulin 4 (four) times daily. 100 each 11    predniSONE (DELTASONE) 5 MG tablet Take by mouth daily: 10/15 to 10/21 20 mg, 10/22 to 10/28 15 mg, 10/29 to 11/4 10 mg, 11/5/23 take 5mg daily indefinitely. DO NOT STOP 70 tablet 11    sodium polystyrene (KAYEXALATE) powder Take 30 g by mouth once daily. 908 g 3    tacrolimus (PROGRAF) 1 MG Cap Take 3 capsules (3 mg total) by mouth every morning AND 2 capsules (2 mg total) every evening. 150 capsule 11    valGANciclovir (VALCYTE) 450 mg Tab Take 2 tablets (900 mg total) by mouth once daily. STOP: 1/13/24 60 tablet 2     No current facility-administered medications for this visit.       Past Medical History:   Diagnosis Date    Anxiety     Cancer     Skin Cancer    Diabetes mellitus     Diabetes mellitus, type 2     Disorder of kidney and ureter     Hyperlipidemia     Hypertension      Review of Systems   Constitutional:  Positive for fatigue. Negative for activity change, appetite change and fever.   HENT:  Negative for congestion, mouth sores and sore throat.    Eyes:  Negative for visual disturbance.   Respiratory:  Negative for cough, chest tightness and shortness of breath.    Cardiovascular:  Negative for chest pain, palpitations and leg swelling.   Gastrointestinal:  Negative for abdominal distention, abdominal pain, constipation, diarrhea and nausea.   Genitourinary:  Negative for difficulty urinating, frequency and hematuria.   Musculoskeletal:  Negative for arthralgias and gait problem.   Skin:  Negative for wound.   Allergic/Immunologic: Positive for immunocompromised state. Negative for environmental allergies and " food allergies.   Neurological:  Negative for dizziness, weakness and numbness.   Psychiatric/Behavioral:  Negative for sleep disturbance. The patient is not nervous/anxious.          Labs:  Lab Results   Component Value Date    WBC 6.6 01/15/2024    HGB 13.7 01/15/2024    HCT 42.8 01/15/2024     01/15/2024    K 5.4 (H) 01/15/2024     01/15/2024    CO2 23 01/15/2024    BUN 23 01/15/2024    CREATININE 1.30 (H) 01/15/2024    EGFRNORACEVR 63 01/15/2024    CALCIUM 9.7 01/15/2024    PHOS 2.9 01/15/2024    MG 1.6 01/15/2024    ALBUMIN 4.4 12/11/2023    AST 18 12/11/2023    ALT 18 12/11/2023    UTPCR 0.29 (H) 11/06/2023    .0 (H) 10/12/2023    .0 (H) 10/12/2023    TACROLIMUS 8.5 01/15/2024       Labs were reviewed with the patient    Assessment:     1. Benign hypertension    2. S/P kidney transplant    3. Type 2 diabetes mellitus with chronic kidney disease on chronic dialysis, with long-term current use of insulin    4. Long-term use of immunosuppressant medication    5. Prophylactic immunotherapy    6. Hypophosphatemia        Plan:     Check K on Monday         # Allograft Function: Cr 1.3-1.5.   - history of CKD secondary to IgA nephropathy and DM2 s/p  living kidney transplant on 10/12/23 (Thymo induction, CMV ++).    - post transplant course has been complicated by perinephric hematoma requiring surgical evacuation (POD1).  - last Cr 1.3  - no proteinuria       # Immunosuppression:   - Continue Prograf 3/2, Ketoconazole 100 mg QD, MMF 1000 mg BID, and Prednisone 5 mg QD.   - Will continue to monitor for drug toxicities    # Hypertension management:   - advise low salt diet and home BP monitoring    - Coreg 12.5 mg BID, Nifedipine 30 mg QD      # Metabolic Bone Disease/Secondary Hyperparathyroidism:  Calcitriol 0.5 mcg QD, MagOx 800 mg QD,  mg BID    # BK virus infection screening:    will continue to monitor per guidelines  BK PCR 11/13/2023: not detected    # prophylaxis  posttransplant    PCP ppx: mepron STOP 4/13/24             Gloria Turner MD       Education:   Material provided to the patient.  Patient reminded to call with any health changes since these can be early signs of significant complications.  Also, I advised the patient to be sure any new medications or changes of old medications are discussed with either a pharmacist or physician knowledgeable with transplant to avoid rejection/drug toxicity related to significant drug interactions.    Patient advised that it is recommended that all transplanted patients, and their close contacts and household members receive Covid vaccination.

## 2024-01-18 ENCOUNTER — OFFICE VISIT (OUTPATIENT)
Dept: TRANSPLANT | Facility: CLINIC | Age: 64
End: 2024-01-18
Payer: COMMERCIAL

## 2024-01-18 DIAGNOSIS — E83.39 HYPOPHOSPHATEMIA: ICD-10-CM

## 2024-01-18 DIAGNOSIS — N18.6 TYPE 2 DIABETES MELLITUS WITH CHRONIC KIDNEY DISEASE ON CHRONIC DIALYSIS, WITH LONG-TERM CURRENT USE OF INSULIN: ICD-10-CM

## 2024-01-18 DIAGNOSIS — Z79.60 LONG-TERM USE OF IMMUNOSUPPRESSANT MEDICATION: ICD-10-CM

## 2024-01-18 DIAGNOSIS — Z94.0 S/P KIDNEY TRANSPLANT: ICD-10-CM

## 2024-01-18 DIAGNOSIS — Z29.89 PROPHYLACTIC IMMUNOTHERAPY: ICD-10-CM

## 2024-01-18 DIAGNOSIS — Z79.4 TYPE 2 DIABETES MELLITUS WITH CHRONIC KIDNEY DISEASE ON CHRONIC DIALYSIS, WITH LONG-TERM CURRENT USE OF INSULIN: ICD-10-CM

## 2024-01-18 DIAGNOSIS — Z99.2 TYPE 2 DIABETES MELLITUS WITH CHRONIC KIDNEY DISEASE ON CHRONIC DIALYSIS, WITH LONG-TERM CURRENT USE OF INSULIN: ICD-10-CM

## 2024-01-18 DIAGNOSIS — E11.22 TYPE 2 DIABETES MELLITUS WITH CHRONIC KIDNEY DISEASE ON CHRONIC DIALYSIS, WITH LONG-TERM CURRENT USE OF INSULIN: ICD-10-CM

## 2024-01-18 DIAGNOSIS — I10 BENIGN HYPERTENSION: Primary | ICD-10-CM

## 2024-01-18 PROCEDURE — 3066F NEPHROPATHY DOC TX: CPT | Mod: CPTII,95,, | Performed by: INTERNAL MEDICINE

## 2024-01-18 PROCEDURE — 99215 OFFICE O/P EST HI 40 MIN: CPT | Mod: 95,,, | Performed by: INTERNAL MEDICINE

## 2024-01-18 NOTE — LETTER
January 18, 2024        Jannet Farias  [address] [place] [address]  Phone: [phone]  Fax: [phone]             [facility]- Transplant 1st Fl  [address]  [facility] [address]  Phone: [phone]   Patient: Heath Hernandez   MR Number: [id number]   YOB: 1960   Date of Visit: 1/18/2024       Dear Dr. Jannet Farias    Thank you for referring Heath Hernandez to me for evaluation. Attached you will find relevant portions of my assessment and plan of care.    If you have questions, please do not hesitate to call me. I look forward to following Heath Hernandez along with you.    Sincerely,    Gloria Turner MD    Enclosure    If you would like to receive this communication electronically, please contact [email or web address] or [phone] to request [employer] Link access.    [employer] Link is a tool which provides read-only access to select patient information with whom you have a relationship. Its easy to use and provides real time access to review your patients record including encounter summaries, notes, results, and demographic information.    If you feel you have received this communication in error or would no longer like to receive these types of communications, please e-mail [email or web address]

## 2024-01-19 ENCOUNTER — PATIENT MESSAGE (OUTPATIENT)
Dept: TRANSPLANT | Facility: CLINIC | Age: 64
End: 2024-01-19
Payer: COMMERCIAL

## 2024-01-23 LAB
BUN SERPL-MCNC: 17 MG/DL (ref 8–27)
BUN/CREAT SERPL: 9 (ref 10–24)
CALCIUM SERPL-MCNC: 9.5 MG/DL (ref 8.6–10.2)
CHLORIDE SERPL-SCNC: 98 MMOL/L (ref 96–106)
CO2 SERPL-SCNC: 24 MMOL/L (ref 20–29)
CREAT SERPL-MCNC: 1.89 MG/DL (ref 0.76–1.27)
EST. GFR  (NO RACE VARIABLE): 40 ML/MIN/1.73
GLUCOSE SERPL-MCNC: 117 MG/DL (ref 70–99)
POTASSIUM SERPL-SCNC: 4.8 MMOL/L (ref 3.5–5.2)
SODIUM SERPL-SCNC: 135 MMOL/L (ref 134–144)
SPECIMEN STATUS REPORT: NORMAL

## 2024-01-23 NOTE — PROGRESS NOTES
This is a huge jump in Cr .  Please repeat renal panel tomorrow. What is his tac level? Does he urinate okay? Any sickness?

## 2024-01-25 ENCOUNTER — DOCUMENTATION ONLY (OUTPATIENT)
Dept: TRANSPLANT | Facility: CLINIC | Age: 64
End: 2024-01-25

## 2024-01-25 LAB
EXT ALBUMIN: 4.8 (ref 3.9–4.9)
EXT ALKALINE PHOSPHATASE: 77 (ref 44–121)
EXT ALLOSURE: ABNORMAL
EXT ALT: 16 (ref 0–40)
EXT AMYLASE: ABNORMAL
EXT ANC: ABNORMAL
EXT AST: 17 (ref 0–40)
EXT BACTERIA UA: ABNORMAL
EXT BANDS%: ABNORMAL
EXT BILIRUBIN DIRECT: <0.1 MG/DL (ref 0–0.4)
EXT BILIRUBIN TOTAL: 0.3 (ref 0–1.2)
EXT BK VIRUS DNA QN PCR: ABNORMAL
EXT BUN: 20 (ref 8–27)
EXT C PEPTIDE: ABNORMAL
EXT CALCIUM: 10 (ref 8.6–10.2)
EXT CHLORIDE: 100 (ref 96–106)
EXT CHOLESTEROL: ABNORMAL
EXT CMV DNA QUANT. BY PCR: ABNORMAL
EXT CO2: 23 (ref 20–29)
EXT CREATININE UA: 16.8
EXT CREATININE: 1.26 MG/DL (ref 0.76–1.27)
EXT CYCLOSPORINE LVL: ABNORMAL
EXT EBV DNA BY PCR: ABNORMAL
EXT EBV IGG: ABNORMAL
EXT EGFR NO RACE VARIABLE: ABNORMAL
EXT EOSINOPHIL%: 1
EXT FERRITIN: ABNORMAL
EXT GFR MDRD AF AMER: ABNORMAL
EXT GFR MDRD NON AF AMER: ABNORMAL
EXT GLUCOSE UA: NEGATIVE
EXT GLUCOSE: 159 (ref 70–99)
EXT HBV DNA QUANT PCR: ABNORMAL
EXT HCV QUANT: ABNORMAL
EXT HDL: ABNORMAL
EXT HEMATOCRIT: 44.5 (ref 37.5–51)
EXT HEMOGLOBIN A1C: ABNORMAL
EXT HEMOGLOBIN: 14 (ref 13–17.7)
EXT HIV RNA QUANT PCR: ABNORMAL
EXT IMMUNKNOW (STIMULATED): ABNORMAL
EXT IRON SATURATION: ABNORMAL
EXT LDH, TOTAL: ABNORMAL
EXT LDL CHOLESTEROL: ABNORMAL
EXT LEFLUNOMIDE METABOLITE: ABNORMAL
EXT LIPASE: ABNORMAL
EXT LYMPH%: 24
EXT MAGNESIUM: 1.6 (ref 1.6–2.3)
EXT MONOCYTES%: 12
EXT NITRITES UA: NEGATIVE
EXT PHOSPHORUS: 3.2 (ref 2.8–4.1)
EXT PLATELETS: 259 (ref 150–450)
EXT POTASSIUM: 5.2 (ref 3.5–5.2)
EXT PROT/CREAT RATIO UR: ABNORMAL
EXT PROTEIN TOTAL: 7 (ref 6–8.5)
EXT PROTEIN UA: NEGATIVE
EXT PTH, INTACT: ABNORMAL
EXT RBC UA: ABNORMAL
EXT SEGS%: 61
EXT SERUM IRON: ABNORMAL
EXT SIROLIMUS LVL: ABNORMAL
EXT SODIUM: 138 MMOL/L (ref 134–144)
EXT TACROLIMUS LVL: 6.8
EXT TIBC: ABNORMAL
EXT TRIGLYCERIDES: ABNORMAL
EXT URIC ACID: ABNORMAL
EXT URINE CULTURE: ABNORMAL
EXT URINE PROTEIN: <4
EXT VIT D 25 HYDROXY: ABNORMAL
EXT WBC UA: ABNORMAL
EXT WBC: 5.9 (ref 3.4–10.8)
PARVOVIRUS B19 DNA BY PCR: ABNORMAL
QUANTIFERON GOLD TB: ABNORMAL

## 2024-01-26 LAB
ALBUMIN SERPL-MCNC: 4.8 G/DL (ref 3.9–4.9)
ALP SERPL-CCNC: 77 IU/L (ref 44–121)
ALT SERPL-CCNC: 16 IU/L (ref 0–44)
APPEARANCE UR: CLEAR
AST SERPL-CCNC: 17 IU/L (ref 0–40)
BASOPHILS # BLD AUTO: 0.1 X10E3/UL (ref 0–0.2)
BASOPHILS NFR BLD AUTO: 1 %
BILIRUB DIRECT SERPL-MCNC: <0.1 MG/DL (ref 0–0.4)
BILIRUB SERPL-MCNC: 0.3 MG/DL (ref 0–1.2)
BILIRUB UR QL STRIP: NEGATIVE
BKV DNA SPEC NAA+PROBE-ACNC: NEGATIVE IU/ML
BUN SERPL-MCNC: 20 MG/DL (ref 8–27)
BUN/CREAT SERPL: 16 (ref 10–24)
CALCIUM SERPL-MCNC: 10 MG/DL (ref 8.6–10.2)
CHLORIDE SERPL-SCNC: 100 MMOL/L (ref 96–106)
CO2 SERPL-SCNC: 23 MMOL/L (ref 20–29)
COLOR UR: YELLOW
CREAT SERPL-MCNC: 1.26 MG/DL (ref 0.76–1.27)
CREAT UR-MCNC: 16.8 MG/DL
EOSINOPHIL # BLD AUTO: 0.1 X10E3/UL (ref 0–0.4)
EOSINOPHIL NFR BLD AUTO: 1 %
ERYTHROCYTE [DISTWIDTH] IN BLOOD BY AUTOMATED COUNT: 14.5 % (ref 11.6–15.4)
EST. GFR  (NO RACE VARIABLE): 65 ML/MIN/1.73
GLUCOSE SERPL-MCNC: 159 MG/DL (ref 70–99)
GLUCOSE UR QL STRIP: NEGATIVE
HCT VFR BLD AUTO: 44.5 % (ref 37.5–51)
HGB BLD-MCNC: 14 G/DL (ref 13–17.7)
HGB UR QL STRIP: NEGATIVE
IMM GRANULOCYTES # BLD AUTO: 0 X10E3/UL (ref 0–0.1)
IMM GRANULOCYTES NFR BLD AUTO: 1 %
KETONES UR QL STRIP: NEGATIVE
LEUKOCYTE ESTERASE UR QL STRIP: NEGATIVE
LYMPHOCYTES # BLD AUTO: 1.4 X10E3/UL (ref 0.7–3.1)
LYMPHOCYTES NFR BLD AUTO: 24 %
MAGNESIUM SERPL-MCNC: 1.6 MG/DL (ref 1.6–2.3)
MCH RBC QN AUTO: 29.9 PG (ref 26.6–33)
MCHC RBC AUTO-ENTMCNC: 31.5 G/DL (ref 31.5–35.7)
MCV RBC AUTO: 95 FL (ref 79–97)
MICRO URNS: NORMAL
MONOCYTES # BLD AUTO: 0.7 X10E3/UL (ref 0.1–0.9)
MONOCYTES NFR BLD AUTO: 12 %
NEUTROPHILS # BLD AUTO: 3.6 X10E3/UL (ref 1.4–7)
NEUTROPHILS NFR BLD AUTO: 61 %
NITRITE UR QL STRIP: NEGATIVE
PH UR STRIP: 6 [PH] (ref 5–7.5)
PHOSPHATE SERPL-MCNC: 3.2 MG/DL (ref 2.8–4.1)
PLATELET # BLD AUTO: 259 X10E3/UL (ref 150–450)
POTASSIUM SERPL-SCNC: 5.2 MMOL/L (ref 3.5–5.2)
PROT SERPL-MCNC: 7 G/DL (ref 6–8.5)
PROT UR QL STRIP: NEGATIVE
PROT UR-MCNC: <4 MG/DL
PROT/CREAT UR: ABNORMAL MG/G CREAT (ref 0–200)
RBC # BLD AUTO: 4.69 X10E6/UL (ref 4.14–5.8)
SODIUM SERPL-SCNC: 138 MMOL/L (ref 134–144)
SP GR UR STRIP: 1.01 (ref 1–1.03)
SPECIMEN STATUS REPORT: NORMAL
TACROLIMUS BLD LC/MS/MS-MCNC: 6.8 NG/ML (ref 2–20)
UROBILINOGEN UR STRIP-MCNC: 0.2 MG/DL (ref 0.2–1)
WBC # BLD AUTO: 5.9 X10E3/UL (ref 3.4–10.8)

## 2024-01-27 NOTE — PROGRESS NOTES
Cr came down and K 5.2. No need for kidney biopsy . Continue good hydration. Check renal panel in 2 weeks

## 2024-02-13 LAB
BASOPHILS # BLD AUTO: 0.1 X10E3/UL (ref 0–0.2)
BASOPHILS NFR BLD AUTO: 1 %
BUN SERPL-MCNC: 19 MG/DL (ref 8–27)
BUN/CREAT SERPL: 14 (ref 10–24)
CALCIUM SERPL-MCNC: 9.7 MG/DL (ref 8.6–10.2)
CHLORIDE SERPL-SCNC: 102 MMOL/L (ref 96–106)
CO2 SERPL-SCNC: 25 MMOL/L (ref 20–29)
CREAT SERPL-MCNC: 1.37 MG/DL (ref 0.76–1.27)
EOSINOPHIL # BLD AUTO: 0 X10E3/UL (ref 0–0.4)
EOSINOPHIL NFR BLD AUTO: 1 %
ERYTHROCYTE [DISTWIDTH] IN BLOOD BY AUTOMATED COUNT: 14.2 % (ref 11.6–15.4)
EST. GFR  (NO RACE VARIABLE): 59 ML/MIN/1.73
GLUCOSE SERPL-MCNC: 139 MG/DL (ref 70–99)
HCT VFR BLD AUTO: 43.6 % (ref 37.5–51)
HGB BLD-MCNC: 14 G/DL (ref 13–17.7)
IMM GRANULOCYTES # BLD AUTO: 0 X10E3/UL (ref 0–0.1)
IMM GRANULOCYTES NFR BLD AUTO: 0 %
LYMPHOCYTES # BLD AUTO: 1.3 X10E3/UL (ref 0.7–3.1)
LYMPHOCYTES NFR BLD AUTO: 27 %
MAGNESIUM SERPL-MCNC: 1.6 MG/DL (ref 1.6–2.3)
MCH RBC QN AUTO: 29.4 PG (ref 26.6–33)
MCHC RBC AUTO-ENTMCNC: 32.1 G/DL (ref 31.5–35.7)
MCV RBC AUTO: 91 FL (ref 79–97)
MONOCYTES # BLD AUTO: 0.8 X10E3/UL (ref 0.1–0.9)
MONOCYTES NFR BLD AUTO: 16 %
NEUTROPHILS # BLD AUTO: 2.5 X10E3/UL (ref 1.4–7)
NEUTROPHILS NFR BLD AUTO: 55 %
PHOSPHATE SERPL-MCNC: 3.6 MG/DL (ref 2.8–4.1)
PLATELET # BLD AUTO: 226 X10E3/UL (ref 150–450)
POTASSIUM SERPL-SCNC: 4.2 MMOL/L (ref 3.5–5.2)
RBC # BLD AUTO: 4.77 X10E6/UL (ref 4.14–5.8)
SODIUM SERPL-SCNC: 142 MMOL/L (ref 134–144)
TACROLIMUS BLD IA-MCNC: 9.1 NG/ML (ref 2–20)
WBC # BLD AUTO: 4.6 X10E3/UL (ref 3.4–10.8)

## 2024-02-14 ENCOUNTER — PATIENT MESSAGE (OUTPATIENT)
Dept: TRANSPLANT | Facility: CLINIC | Age: 64
End: 2024-02-14
Payer: COMMERCIAL

## 2024-02-18 NOTE — LETTER
2022     Heath David  [address] [facility] [address]    Dear Heath Hernandez:  MRN: [id number]    Congratulations! On 3/2/2022, you were placed on  the waiting list for a  donor transplant.    Your candidacy for kidney transplant is based on the following criteria: CKD, Stage 4 due to  Diabetes Mellitus - Type II.    Your transplant coordinator while on the waiting list is Jude Diana RN. They can be reached at [phone] or [phone] with any questions.      What to do now?    ? Ask your living donors to call and begin testing   Give your donors our phone number, [phone]   Make sure your donors have your name and date of birth when they call   You will get transplanted much faster if you have a living donor    ? Have your blood sent to our Transplant Lab every month   If you are on dialysis - our Transplant Lab will work with your dialysis unit to send your blood every month   If you are not on dialysis   o If you live near an [facility] lab, we will schedule you to have blood drawn every month  o If you do not live near an [facility] lab, you will be sent blood kits in the mail. You will need to take a kit to your local lab or doctor to have your blood drawn every month and mail to the Transplant Lab.     ? Call us with ANY CHANGES   Phone numbers - we must be able to reach you anytime of the day or night when a kidney is available   Address   Insurance coverage   Dialysis unit or kidney doctor   Nabeel: if you have surgery, stay in the hospital, have to get blood, or have an infection    ? Review your Kidney/Kidney-Pancreas Transplant Guide    This will give you detailed information about what happens when  o you are on the waiting list   o you are called when a kidney is available    The [facility] has a transplant surgeon and physician available 365 days a year, 24 hours a day to coordinate organ acceptance, procurement, surgical placement and to  address urgent patient care issues.  You will be notified in writing of any changes to our Transplant Centers staffing plan that would impact your ability to receive a transplant.    Attached is a letter from the [employer] for Organ Sharing (UNOS). It describes the services and information offered to patients by UNOS and the Organ Procurement and Transplant Network. We look forward to working with you while on the waiting list.     Congratulations,    Your Transplant Partner  [facility]   [address] [place] [id number]  [phone]  lh/enclosure  CC:  Jannet Farias MD                                                                                               The Organ Procurement and Transplantation Network   Toll-free patient services line: Your resource for organ transplant information     If you have a question regarding your own medical care, you always should call your transplant hospital first. However, for general organ transplant-related information, you can call the Organ Procurement and Transplantation Network (OPTN) toll-free patient services line at [phone].     Anyone, including potential transplant candidates, candidates, recipients, family members, friends, living donors, and donor family members, can call this number to:     · Talk about organ donation, living donation, the transplant process, the donation process, and transplant policies.   · Get a free patient information kit with helpful booklets, waiting list and transplant information, and a list of all transplant hospitals.   · Ask questions about the OPTN website (https://optn.transplant.hrsa.gov/), the [employer] Organ [employer]arings (UNOS) website (https://unos.org/), or the UNOS website for living donors and transplant recipients. (https://www.transplantliving.org/).   · Learn how the OPTN can help you.   · Talk about any concerns that you may have with a transplant hospital.      The nations transplant system, the OPTN, is managed under federal contract by the [employer] Organ [employer]aring (UNOS), which is a non-profit charitable organization. The OPTN helps create and define organ sharing policies that make the best use of donated organs. This process continuously evaluating new advances and discoveries so policies can be adapted to best serve patients waiting for transplants. To do so, the OPTN works closely with transplant professionals, transplant patients, transplant candidates, donor families, living donors, and the public. All transplant programs and organ procurement organizations throughout the country are OPTN members and are obligated to follow the policies the OPTN creates for allocating organs.     The OPTN also is responsible for:   · Providing educational material for patients, the public, and professionals.   · Raising awareness of the need for donated organs and tissue.   · Coordinating organ procurement, matching, and placement.   · Collecting information about every organ transplant and donation that occurs in the [place].     Remember, you should contact your transplant hospital directly if you have questions or concerns about your own medical care including medical records, work-up progress, and test results.     We are not your transplant hospital, and our staff will not be able to answer questions about your case, so please keep your transplant hospitals phone number handy.   However, while you research your transplant needs and learn as much as you can about transplantation and donation, we welcome your call to our toll-free patient services line at [phone].      English

## 2024-03-09 LAB
BASOPHILS # BLD AUTO: 0.1 X10E3/UL (ref 0–0.2)
BASOPHILS NFR BLD AUTO: 1 %
BUN SERPL-MCNC: 14 MG/DL (ref 8–27)
BUN/CREAT SERPL: 11 (ref 10–24)
CALCIUM SERPL-MCNC: 9.8 MG/DL (ref 8.6–10.2)
CHLORIDE SERPL-SCNC: 100 MMOL/L (ref 96–106)
CO2 SERPL-SCNC: 27 MMOL/L (ref 20–29)
CREAT SERPL-MCNC: 1.25 MG/DL (ref 0.76–1.27)
EOSINOPHIL # BLD AUTO: 0.2 X10E3/UL (ref 0–0.4)
EOSINOPHIL NFR BLD AUTO: 3 %
ERYTHROCYTE [DISTWIDTH] IN BLOOD BY AUTOMATED COUNT: 13.3 % (ref 11.6–15.4)
EST. GFR  (NO RACE VARIABLE): 66 ML/MIN/1.73
GLUCOSE SERPL-MCNC: 148 MG/DL (ref 70–99)
HCT VFR BLD AUTO: 44.2 % (ref 37.5–51)
HGB BLD-MCNC: 14.4 G/DL (ref 13–17.7)
IMM GRANULOCYTES # BLD AUTO: 0 X10E3/UL (ref 0–0.1)
IMM GRANULOCYTES NFR BLD AUTO: 1 %
LYMPHOCYTES # BLD AUTO: 1.4 X10E3/UL (ref 0.7–3.1)
LYMPHOCYTES NFR BLD AUTO: 21 %
MAGNESIUM SERPL-MCNC: 1.5 MG/DL (ref 1.6–2.3)
MCH RBC QN AUTO: 29.6 PG (ref 26.6–33)
MCHC RBC AUTO-ENTMCNC: 32.6 G/DL (ref 31.5–35.7)
MCV RBC AUTO: 91 FL (ref 79–97)
MONOCYTES # BLD AUTO: 0.7 X10E3/UL (ref 0.1–0.9)
MONOCYTES NFR BLD AUTO: 10 %
NEUTROPHILS # BLD AUTO: 4.1 X10E3/UL (ref 1.4–7)
NEUTROPHILS NFR BLD AUTO: 64 %
PHOSPHATE SERPL-MCNC: 3.3 MG/DL (ref 2.8–4.1)
PLATELET # BLD AUTO: 281 X10E3/UL (ref 150–450)
POTASSIUM SERPL-SCNC: 5.6 MMOL/L (ref 3.5–5.2)
RBC # BLD AUTO: 4.87 X10E6/UL (ref 4.14–5.8)
SODIUM SERPL-SCNC: 140 MMOL/L (ref 134–144)
TACROLIMUS BLD IA-MCNC: 11.2 NG/ML (ref 2–20)
WBC # BLD AUTO: 6.4 X10E3/UL (ref 3.4–10.8)

## 2024-03-11 ENCOUNTER — PATIENT MESSAGE (OUTPATIENT)
Dept: TRANSPLANT | Facility: CLINIC | Age: 64
End: 2024-03-11
Payer: COMMERCIAL

## 2024-04-16 ENCOUNTER — DOCUMENTATION ONLY (OUTPATIENT)
Dept: TRANSPLANT | Facility: CLINIC | Age: 64
End: 2024-04-16
Payer: COMMERCIAL

## 2024-04-16 LAB
EXT ALBUMIN: 5 (ref 3.9–4.9)
EXT ALKALINE PHOSPHATASE: 102 (ref 44–121)
EXT ALLOSURE: ABNORMAL
EXT ALT: 23 (ref 0–44)
EXT AMYLASE: ABNORMAL
EXT ANC: ABNORMAL
EXT AST: 23 (ref 0–40)
EXT BACTERIA UA: NEGATIVE
EXT BANDS%: ABNORMAL
EXT BILIRUBIN DIRECT: 0.11 MG/DL (ref 0–0.4)
EXT BILIRUBIN TOTAL: 0.3 (ref 0–1.2)
EXT BK VIRUS DNA QN PCR: ABNORMAL
EXT BUN: 15 (ref 8–27)
EXT C PEPTIDE: ABNORMAL
EXT CALCIUM: 9.6 (ref 8.6–10.2)
EXT CHLORIDE: 99 (ref 96–106)
EXT CHOLESTEROL: ABNORMAL
EXT CMV DNA QUANT. BY PCR: ABNORMAL
EXT CO2: 25 (ref 20–29)
EXT CREATININE UA: ABNORMAL
EXT CREATININE: 1.29 MG/DL (ref 0.76–1.27)
EXT CYCLOSPORINE LVL: ABNORMAL
EXT EBV DNA BY PCR: ABNORMAL
EXT EBV IGG: ABNORMAL
EXT EGFR NO RACE VARIABLE: 63
EXT EOSINOPHIL%: 3
EXT FERRITIN: ABNORMAL
EXT GFR MDRD AF AMER: ABNORMAL
EXT GFR MDRD NON AF AMER: ABNORMAL
EXT GLUCOSE UA: ABNORMAL
EXT GLUCOSE: 152 (ref 70–99)
EXT HBV DNA QUANT PCR: ABNORMAL
EXT HCV QUANT: ABNORMAL
EXT HDL: ABNORMAL
EXT HEMATOCRIT: 48.1 (ref 37.5–51)
EXT HEMOGLOBIN A1C: ABNORMAL
EXT HEMOGLOBIN: 15.2 (ref 13–17.7)
EXT HIV RNA QUANT PCR: ABNORMAL
EXT IMMUNKNOW (STIMULATED): ABNORMAL
EXT IRON SATURATION: ABNORMAL
EXT LDH, TOTAL: ABNORMAL
EXT LDL CHOLESTEROL: ABNORMAL
EXT LEFLUNOMIDE METABOLITE: ABNORMAL
EXT LIPASE: ABNORMAL
EXT LYMPH%: 37
EXT MAGNESIUM: 1.6 (ref 1.6–2.3)
EXT MONOCYTES%: 14
EXT NITRITES UA: NEGATIVE
EXT PHOSPHORUS: 2.9 (ref 2.8–4.1)
EXT PLATELETS: 201 (ref 150–450)
EXT POTASSIUM: 4.5 (ref 3.5–5.2)
EXT PROT/CREAT RATIO UR: ABNORMAL
EXT PROTEIN TOTAL: 7.4 (ref 6–8.5)
EXT PROTEIN UA: NEGATIVE
EXT PTH, INTACT: ABNORMAL
EXT RBC UA: NEGATIVE
EXT SEGS%: 44
EXT SERUM IRON: ABNORMAL
EXT SIROLIMUS LVL: ABNORMAL
EXT SODIUM: 139 MMOL/L (ref 134–144)
EXT TACROLIMUS LVL: ABNORMAL
EXT TIBC: ABNORMAL
EXT TRIGLYCERIDES: ABNORMAL
EXT URIC ACID: ABNORMAL
EXT URINE CULTURE: ABNORMAL
EXT URINE PROTEIN: ABNORMAL
EXT VIT D 25 HYDROXY: ABNORMAL
EXT WBC UA: NEGATIVE
EXT WBC: 3.8 (ref 3.4–10.8)
PARVOVIRUS B19 DNA BY PCR: ABNORMAL
QUANTIFERON GOLD TB: ABNORMAL

## 2024-04-17 ENCOUNTER — DOCUMENTATION ONLY (OUTPATIENT)
Dept: TRANSPLANT | Facility: CLINIC | Age: 64
End: 2024-04-17
Payer: COMMERCIAL

## 2024-04-17 DIAGNOSIS — Z94.0 S/P KIDNEY TRANSPLANT: ICD-10-CM

## 2024-04-17 DIAGNOSIS — Z79.60 LONG-TERM USE OF IMMUNOSUPPRESSANT MEDICATION: ICD-10-CM

## 2024-04-17 LAB
ALBUMIN SERPL-MCNC: 5 G/DL (ref 3.9–4.9)
ALP SERPL-CCNC: 102 IU/L (ref 44–121)
ALT SERPL-CCNC: 23 IU/L (ref 0–44)
APPEARANCE UR: CLEAR
AST SERPL-CCNC: 23 IU/L (ref 0–40)
BACTERIA #/AREA URNS HPF: NORMAL /[HPF]
BASOPHILS # BLD AUTO: 0 X10E3/UL (ref 0–0.2)
BASOPHILS NFR BLD AUTO: 1 %
BILIRUB DIRECT SERPL-MCNC: 0.11 MG/DL (ref 0–0.4)
BILIRUB SERPL-MCNC: 0.3 MG/DL (ref 0–1.2)
BILIRUB UR QL STRIP: NEGATIVE
BKV DNA SPEC NAA+PROBE-ACNC: NEGATIVE IU/ML
BUN SERPL-MCNC: 15 MG/DL (ref 8–27)
BUN/CREAT SERPL: 12 (ref 10–24)
CALCIUM SERPL-MCNC: 9.6 MG/DL (ref 8.6–10.2)
CASTS URNS QL MICRO: NORMAL /LPF
CHLORIDE SERPL-SCNC: 99 MMOL/L (ref 96–106)
CO2 SERPL-SCNC: 25 MMOL/L (ref 20–29)
COLOR UR: YELLOW
CREAT SERPL-MCNC: 1.29 MG/DL (ref 0.76–1.27)
CREAT UR-MCNC: 8.4 MG/DL
EOSINOPHIL # BLD AUTO: 0.1 X10E3/UL (ref 0–0.4)
EOSINOPHIL NFR BLD AUTO: 3 %
EPI CELLS #/AREA URNS HPF: NORMAL /HPF (ref 0–10)
ERYTHROCYTE [DISTWIDTH] IN BLOOD BY AUTOMATED COUNT: 13 % (ref 11.6–15.4)
EST. GFR  (NO RACE VARIABLE): 63 ML/MIN/1.73
GLUCOSE SERPL-MCNC: 152 MG/DL (ref 70–99)
GLUCOSE UR QL STRIP: ABNORMAL
HCT VFR BLD AUTO: 48.1 % (ref 37.5–51)
HGB BLD-MCNC: 15.2 G/DL (ref 13–17.7)
HGB UR QL STRIP: NEGATIVE
IMM GRANULOCYTES # BLD AUTO: 0 X10E3/UL (ref 0–0.1)
IMM GRANULOCYTES NFR BLD AUTO: 1 %
KETONES UR QL STRIP: NEGATIVE
LEUKOCYTE ESTERASE UR QL STRIP: NEGATIVE
LYMPHOCYTES # BLD AUTO: 1.4 X10E3/UL (ref 0.7–3.1)
LYMPHOCYTES NFR BLD AUTO: 37 %
MAGNESIUM SERPL-MCNC: 1.6 MG/DL (ref 1.6–2.3)
MCH RBC QN AUTO: 30 PG (ref 26.6–33)
MCHC RBC AUTO-ENTMCNC: 31.6 G/DL (ref 31.5–35.7)
MCV RBC AUTO: 95 FL (ref 79–97)
MICRO URNS: ABNORMAL
MICRO URNS: ABNORMAL
MONOCYTES # BLD AUTO: 0.5 X10E3/UL (ref 0.1–0.9)
MONOCYTES NFR BLD AUTO: 14 %
NEUTROPHILS # BLD AUTO: 1.7 X10E3/UL (ref 1.4–7)
NEUTROPHILS NFR BLD AUTO: 44 %
NITRITE UR QL STRIP: NEGATIVE
PH UR STRIP: 6.5 [PH] (ref 5–7.5)
PHOSPHATE SERPL-MCNC: 2.9 MG/DL (ref 2.8–4.1)
PLATELET # BLD AUTO: 201 X10E3/UL (ref 150–450)
POTASSIUM SERPL-SCNC: 4.5 MMOL/L (ref 3.5–5.2)
PROT SERPL-MCNC: 7.4 G/DL (ref 6–8.5)
PROT UR QL STRIP: NEGATIVE
PROT UR-MCNC: <4 MG/DL
PROT/CREAT UR: ABNORMAL MG/G CREAT (ref 0–200)
RBC # BLD AUTO: 5.07 X10E6/UL (ref 4.14–5.8)
RBC #/AREA URNS HPF: NORMAL /HPF (ref 0–2)
SODIUM SERPL-SCNC: 139 MMOL/L (ref 134–144)
SP GR UR STRIP: <=1.005 (ref 1–1.03)
TACROLIMUS BLD IA-MCNC: 11.4 NG/ML (ref 2–20)
UROBILINOGEN UR STRIP-MCNC: 0.2 MG/DL (ref 0.2–1)
WBC # BLD AUTO: 3.8 X10E3/UL (ref 3.4–10.8)
WBC #/AREA URNS HPF: NORMAL /HPF (ref 0–5)

## 2024-04-17 NOTE — TELEPHONE ENCOUNTER
----- Message from Gloria Turner MD sent at 4/17/2024  1:35 PM CDT -----  Tac to 2 mg BID if it is a true trough. Check tac in 2 weeks    Ears: no ear pain and no hearing problems. Nose: no nasal congestion and no nasal drainage. Mouth/Throat: no dysphagia, no hoarseness and no throat pain. Neck: no lumps, no pain, no stiffness and no swollen glands.

## 2024-04-18 RX ORDER — TACROLIMUS 1 MG/1
CAPSULE ORAL
Qty: 120 CAPSULE | Refills: 11 | Status: SHIPPED | OUTPATIENT
Start: 2024-04-18 | End: 2025-04-19

## 2024-04-19 ENCOUNTER — OFFICE VISIT (OUTPATIENT)
Dept: TRANSPLANT | Facility: CLINIC | Age: 64
End: 2024-04-19
Payer: COMMERCIAL

## 2024-04-19 DIAGNOSIS — I10 BENIGN HYPERTENSION: ICD-10-CM

## 2024-04-19 DIAGNOSIS — Z79.60 LONG-TERM USE OF IMMUNOSUPPRESSANT MEDICATION: ICD-10-CM

## 2024-04-19 DIAGNOSIS — Z94.0 S/P KIDNEY TRANSPLANT: Primary | ICD-10-CM

## 2024-04-19 PROCEDURE — 3066F NEPHROPATHY DOC TX: CPT | Mod: CPTII,95,, | Performed by: INTERNAL MEDICINE

## 2024-04-19 PROCEDURE — 99215 OFFICE O/P EST HI 40 MIN: CPT | Mod: 95,,, | Performed by: INTERNAL MEDICINE

## 2024-04-19 NOTE — LETTER
April 19, 2024        Jannet Farias  [address] [place] [address]  Phone: [phone]  Fax: [phone]             [facility]- Transplant 1st Fl  [address]  [facility] [address]  Phone: [phone]   Patient: Heath Hernandez   MR Number: [id number]   YOB: 1960   Date of Visit: 4/19/2024       Dear Dr. Jannet Farias    Thank you for referring Heath Hernandez to me for evaluation. Attached you will find relevant portions of my assessment and plan of care.    If you have questions, please do not hesitate to call me. I look forward to following Heath Hernandez along with you.    Sincerely,    Gloria Turner MD    Enclosure    If you would like to receive this communication electronically, please contact [email or web address] or [phone] to request [employer] access.    [employer] is a tool which provides read-only access to select patient information with whom you have a relationship. Its easy to use and provides real time access to review your patients record including encounter summaries, notes, results, and demographic information.    If you feel you have received this communication in error or would no longer like to receive these types of communications, please e-mail [email or web address]

## 2024-04-19 NOTE — PROGRESS NOTES
The patient location is: Home  The chief complaint leading to consultation is: Reassessment of renal allograft function and management of chronic immunosuppression.    Visit type: audiovisual    Face to Face time with patient: 15  30 minutes of total time spent on the encounter, which includes face to face time and non-face to face time preparing to see the patient (eg, review of tests), Obtaining and/or reviewing separately obtained history, Documenting clinical information in the electronic or other health record, Independently interpreting results (not separately reported) and communicating results to the patient/family/caregiver, or Care coordination (not separately reported).         Each patient to whom he or she provides medical services by telemedicine is:  (1) informed of the relationship between the physician and patient and the respective role of any other health care provider with respect to management of the patient; and (2) notified that he or she may decline to receive medical services by telemedicine and may withdraw from such care at any time.    Notes:    Kidney Post-Transplant Assessment    Referring Physician: Jannet Farias  Current Nephrologist: Jannet Farias    ORGAN: LEFT KIDNEY  Donor Type: living  PHS Increased Risk: no  Cold Ischemia: 44 mins  Induction Medications: thymoglobulin    Subjective:     CC:  Reassessment of renal allograft function and management of chronic immunosuppression.    HPI:  Mr. Hernandez is a 63 y.o. year old White male with history of CKD secondary to IgA nephropathy and DM2 who received a living kidney transplant on 10/12/23 (Thymo induction, CMV ++).  His most recent creatinine is 1.29 . He takes mycophenolate mofetil, prednisone, and tacrolimus for maintenance immunosuppression.       His post transplant course   - perinephric hematoma requiring surgical evacuation (POD1).  - Had KEYA drain for low grade urine leak from the decapsulated kidney parenchyma  "which was finally removed 11/15.  - Hyperkalemia: improving. On Kayexalate as needed. The insurance denied lokelma and veltassa.      Interval History: Feels great without complaints. Staying hydrated. BP at goal.  No peripheral edema. Tolerating IS without issues, no diarrhea or vomiting. he denies any chest pain, shortness of breath, ENT symptoms, nausea/vomiting, dysuria, frequency, urgency, or pain over the allograft.    All lab reviewed and his questions are answered     Current Outpatient Medications   Medication Sig Dispense Refill    atorvastatin (LIPITOR) 10 MG tablet Take 1 tablet (10 mg total) by mouth once daily. 30 tablet 11    calcitRIOL (ROCALTROL) 0.5 MCG Cap Take 1 capsule (0.5 mcg total) by mouth once daily. 30 capsule 11    carvediloL (COREG) 12.5 MG tablet Take 1 tablet (12.5 mg total) by mouth 2 (two) times daily. HOLD SBP <130, HR <60 60 tablet 11    DEXCOM G7 SENSOR Lady 1 Device by Misc.(Non-Drug; Combo Route) route every 10 days. 3 each 11    insulin glargine-yfgn (SEMGLEE,INSULIN GLARG-YFGN,PEN) 100 unit/mL (3 mL) InPn Inject 8 Units into the skin once daily. 3 mL 11    insulin lispro (HUMALOG KWIKPEN INSULIN) 100 unit/mL pen Inject 6 Units into the skin 3 (three) times daily with meals. PLUS CORRECTION SCALE (MAX DAILY DOSE 30 UNITS) 15 mL 2    ketoconazole (NIZORAL) 200 mg Tab Take ½ tablet (100 mg total) by mouth once daily. 30 tablet 11    magnesium oxide (MAG-OX) 400 mg (241.3 mg magnesium) tablet Take 2 tablets (800 mg total) by mouth once daily. 60 tablet 11    multivitamin Tab Take 1 tablet by mouth once daily. 30 tablet 11    mycophenolate (CELLCEPT) 250 mg Cap Take 4 capsules (1,000 mg total) by mouth 2 (two) times daily. 240 capsule 11    NIFEdipine (PROCARDIA-XL) 30 MG (OSM) 24 hr tablet Take 1 tablet (30 mg total) by mouth every 12 (twelve) hours. HOLD if systolic blood pressure <130 60 tablet 11    pen needle, diabetic 32 gauge x 5/32" Ndle Use 1 pen needle to inject insulin 4 " (four) times daily. 100 each 11    predniSONE (DELTASONE) 5 MG tablet Take by mouth daily: 10/15 to 10/21 20 mg, 10/22 to 10/28 15 mg, 10/29 to 11/4 10 mg, 11/5/23 take 5mg daily indefinitely. DO NOT STOP 70 tablet 11    sodium polystyrene (KAYEXALATE) powder Take 30 g by mouth once daily. 908 g 3    tacrolimus (PROGRAF) 1 MG Cap Take 2 capsules (2 mg total) by mouth every morning AND 2 capsules (2 mg total) every evening. 120 capsule 11     No current facility-administered medications for this visit.       Past Medical History:   Diagnosis Date    Anxiety     Cancer     Skin Cancer    Diabetes mellitus     Diabetes mellitus, type 2     Disorder of kidney and ureter     Hyperlipidemia     Hypertension      Review of Systems   Constitutional:  Positive for fatigue. Negative for activity change, appetite change and fever.   HENT:  Negative for congestion, mouth sores and sore throat.    Eyes:  Negative for visual disturbance.   Respiratory:  Negative for cough, chest tightness and shortness of breath.    Cardiovascular:  Negative for chest pain, palpitations and leg swelling.   Gastrointestinal:  Negative for abdominal distention, abdominal pain, constipation, diarrhea and nausea.   Genitourinary:  Negative for difficulty urinating, frequency and hematuria.   Musculoskeletal:  Negative for arthralgias and gait problem.   Skin:  Negative for wound.   Allergic/Immunologic: Positive for immunocompromised state. Negative for environmental allergies and food allergies.   Neurological:  Negative for dizziness, weakness and numbness.   Psychiatric/Behavioral:  Negative for sleep disturbance. The patient is not nervous/anxious.          Labs:  Lab Results   Component Value Date    WBC 3.8 04/15/2024    HGB 15.2 04/15/2024    HCT 48.1 04/15/2024     04/15/2024    K 4.5 04/15/2024    CL 99 04/15/2024    CO2 25 04/15/2024    BUN 15 04/15/2024    CREATININE 1.29 (H) 04/15/2024    EGFRNORACEVR 63 04/15/2024    CALCIUM 9.6  04/15/2024    PHOS 2.9 04/15/2024    MG 1.6 04/15/2024    ALBUMIN 5.0 (H) 04/15/2024    AST 23 04/15/2024    ALT 23 04/15/2024    UTPCR 0.29 (H) 11/06/2023    .0 (H) 10/12/2023    .0 (H) 10/12/2023    TACROLIMUS 11.4 04/15/2024       Labs were reviewed with the patient    Assessment:     1. S/P kidney transplant    2. Benign hypertension    3. Long-term use of immunosuppressant medication          Plan:     Lab  next week   See nephrologist every 3 months         # Allograft Function: Cr 1.2-1.5.   - history of CKD secondary to IgA nephropathy and DM2 s/p  living kidney transplant on 10/12/23 (Thymo induction, CMV ++).    - post transplant course has been complicated by perinephric hematoma requiring surgical evacuation (POD1).  - last Cr 1.29  - no proteinuria       # Hx of hyperkalemia  K improved - on Kayexalate   The insurance denied [facility] and veltassa. H    # Immunosuppression:   - Continue Prograf 2/2, Ketoconazole 100 mg QD, MMF 1000 mg BID, and Prednisone 5 mg QD.   - Will continue to monitor for drug toxicities    # Hypertension management:   - advise low salt diet and home BP monitoring    - Coreg 12.5 mg BID, Nifedipine 30 mg QD      # Metabolic Bone Disease/Secondary Hyperparathyroidism:  Calcitriol 0.5 mcg QD  Mg Oxide  800 mg BID     # BK virus infection screening:    will continue to monitor per guidelines  BK PCR  not detected        Gloria Turner MD       Education:   Material provided to the patient.  Patient reminded to call with any health changes since these can be early signs of significant complications.  Also, I advised the patient to be sure any new medications or changes of old medications are discussed with either a pharmacist or physician knowledgeable with transplant to avoid rejection/drug toxicity related to significant drug interactions.    Patient advised that it is recommended that all transplanted patients, and their close contacts and household members receive  Covid vaccination.

## 2024-04-29 ENCOUNTER — DOCUMENTATION ONLY (OUTPATIENT)
Dept: TRANSPLANT | Facility: CLINIC | Age: 64
End: 2024-04-29
Payer: COMMERCIAL

## 2024-04-30 LAB — TACROLIMUS BLD IA-MCNC: 14.5 NG/ML (ref 2–20)

## 2024-05-01 ENCOUNTER — TELEPHONE (OUTPATIENT)
Dept: TRANSPLANT | Facility: CLINIC | Age: 64
End: 2024-05-01
Payer: COMMERCIAL

## 2024-05-01 ENCOUNTER — PATIENT MESSAGE (OUTPATIENT)
Dept: TRANSPLANT | Facility: CLINIC | Age: 64
End: 2024-05-01
Payer: COMMERCIAL

## 2024-05-01 NOTE — TELEPHONE ENCOUNTER
Message sent  Lab form faxed    ----- Message from Gloria Turner MD sent at 5/1/2024  7:25 AM CDT -----  If it is a true trough, stop ketoconazole. Check tac level on Monday.

## 2024-05-08 LAB — TACROLIMUS BLD IA-MCNC: 7.7 NG/ML (ref 2–20)

## 2024-05-14 LAB
BASOPHILS # BLD AUTO: 0 X10E3/UL (ref 0–0.2)
BASOPHILS NFR BLD AUTO: 1 %
BUN SERPL-MCNC: 15 MG/DL (ref 8–27)
BUN/CREAT SERPL: 13 (ref 10–24)
CALCIUM SERPL-MCNC: 9.2 MG/DL (ref 8.6–10.2)
CHLORIDE SERPL-SCNC: 91 MMOL/L (ref 96–106)
CO2 SERPL-SCNC: 25 MMOL/L (ref 20–29)
CREAT SERPL-MCNC: 1.15 MG/DL (ref 0.76–1.27)
EOSINOPHIL # BLD AUTO: 0.2 X10E3/UL (ref 0–0.4)
EOSINOPHIL NFR BLD AUTO: 3 %
ERYTHROCYTE [DISTWIDTH] IN BLOOD BY AUTOMATED COUNT: 13.6 % (ref 11.6–15.4)
EST. GFR  (NO RACE VARIABLE): 72 ML/MIN/1.73
GLUCOSE SERPL-MCNC: 153 MG/DL (ref 70–99)
HCT VFR BLD AUTO: 41.2 % (ref 37.5–51)
HGB BLD-MCNC: 13.2 G/DL (ref 13–17.7)
IMM GRANULOCYTES # BLD AUTO: 0 X10E3/UL (ref 0–0.1)
IMM GRANULOCYTES NFR BLD AUTO: 0 %
LYMPHOCYTES # BLD AUTO: 1.2 X10E3/UL (ref 0.7–3.1)
LYMPHOCYTES NFR BLD AUTO: 18 %
MAGNESIUM SERPL-MCNC: 1.5 MG/DL (ref 1.6–2.3)
MCH RBC QN AUTO: 29 PG (ref 26.6–33)
MCHC RBC AUTO-ENTMCNC: 32 G/DL (ref 31.5–35.7)
MCV RBC AUTO: 91 FL (ref 79–97)
MONOCYTES # BLD AUTO: 0.6 X10E3/UL (ref 0.1–0.9)
MONOCYTES NFR BLD AUTO: 9 %
NEUTROPHILS # BLD AUTO: 4.4 X10E3/UL (ref 1.4–7)
NEUTROPHILS NFR BLD AUTO: 69 %
PHOSPHATE SERPL-MCNC: 3.1 MG/DL (ref 2.8–4.1)
PLATELET # BLD AUTO: 219 X10E3/UL (ref 150–450)
POTASSIUM SERPL-SCNC: 4.4 MMOL/L (ref 3.5–5.2)
RBC # BLD AUTO: 4.55 X10E6/UL (ref 4.14–5.8)
SODIUM SERPL-SCNC: 129 MMOL/L (ref 134–144)
TACROLIMUS BLD IA-MCNC: 7.4 NG/ML (ref 2–20)
WBC # BLD AUTO: 6.4 X10E3/UL (ref 3.4–10.8)

## 2024-05-17 LAB — SODIUM SERPL-SCNC: 139 MMOL/L (ref 134–144)

## 2024-05-18 DIAGNOSIS — E87.5 HYPERKALEMIA: ICD-10-CM

## 2024-05-24 ENCOUNTER — PATIENT MESSAGE (OUTPATIENT)
Dept: TRANSPLANT | Facility: CLINIC | Age: 64
End: 2024-05-24
Payer: COMMERCIAL

## 2024-05-24 DIAGNOSIS — E87.5 HYPERKALEMIA: ICD-10-CM

## 2024-05-24 RX ORDER — SODIUM POLYSTYRENE SULFONATE 4.1 MEQ/G
30 POWDER, FOR SUSPENSION ORAL; RECTAL DAILY
Qty: 908 G | Refills: 11 | Status: SHIPPED | OUTPATIENT
Start: 2024-05-24

## 2024-05-24 RX ORDER — SODIUM POLYSTYRENE SULFONATE 4.1 MEQ/G
30 POWDER, FOR SUSPENSION ORAL; RECTAL DAILY
Qty: 908 G | Refills: 3 | OUTPATIENT
Start: 2024-05-24

## 2024-06-11 LAB
BASOPHILS # BLD AUTO: 0.1 X10E3/UL (ref 0–0.2)
BASOPHILS NFR BLD AUTO: 1 %
BUN SERPL-MCNC: 17 MG/DL (ref 8–27)
BUN/CREAT SERPL: 17 (ref 10–24)
CALCIUM SERPL-MCNC: 9.7 MG/DL (ref 8.6–10.2)
CHLORIDE SERPL-SCNC: 99 MMOL/L (ref 96–106)
CO2 SERPL-SCNC: 24 MMOL/L (ref 20–29)
CREAT SERPL-MCNC: 1.02 MG/DL (ref 0.76–1.27)
EOSINOPHIL # BLD AUTO: 0.1 X10E3/UL (ref 0–0.4)
EOSINOPHIL NFR BLD AUTO: 2 %
ERYTHROCYTE [DISTWIDTH] IN BLOOD BY AUTOMATED COUNT: 13.2 % (ref 11.6–15.4)
EST. GFR  (NO RACE VARIABLE): 84 ML/MIN/1.73
GLUCOSE SERPL-MCNC: 117 MG/DL (ref 70–99)
HCT VFR BLD AUTO: 44.5 % (ref 37.5–51)
HGB BLD-MCNC: 14.7 G/DL (ref 13–17.7)
IMM GRANULOCYTES # BLD AUTO: 0 X10E3/UL (ref 0–0.1)
IMM GRANULOCYTES NFR BLD AUTO: 1 %
LYMPHOCYTES # BLD AUTO: 1.5 X10E3/UL (ref 0.7–3.1)
LYMPHOCYTES NFR BLD AUTO: 26 %
MAGNESIUM SERPL-MCNC: 1.6 MG/DL (ref 1.6–2.3)
MCH RBC QN AUTO: 30.8 PG (ref 26.6–33)
MCHC RBC AUTO-ENTMCNC: 33 G/DL (ref 31.5–35.7)
MCV RBC AUTO: 93 FL (ref 79–97)
MONOCYTES # BLD AUTO: 0.8 X10E3/UL (ref 0.1–0.9)
MONOCYTES NFR BLD AUTO: 13 %
NEUTROPHILS # BLD AUTO: 3.4 X10E3/UL (ref 1.4–7)
NEUTROPHILS NFR BLD AUTO: 57 %
PHOSPHATE SERPL-MCNC: 3.4 MG/DL (ref 2.8–4.1)
PLATELET # BLD AUTO: 239 X10E3/UL (ref 150–450)
POTASSIUM SERPL-SCNC: 4.6 MMOL/L (ref 3.5–5.2)
RBC # BLD AUTO: 4.78 X10E6/UL (ref 4.14–5.8)
SODIUM SERPL-SCNC: 139 MMOL/L (ref 134–144)
TACROLIMUS BLD IA-MCNC: 7.7 NG/ML (ref 2–20)
WBC # BLD AUTO: 5.9 X10E3/UL (ref 3.4–10.8)

## 2024-06-12 ENCOUNTER — DOCUMENTATION ONLY (OUTPATIENT)
Dept: TRANSPLANT | Facility: CLINIC | Age: 64
End: 2024-06-12

## 2024-06-12 LAB
EXT ALBUMIN: ABNORMAL
EXT ALKALINE PHOSPHATASE: ABNORMAL
EXT ALLOSURE: ABNORMAL
EXT ALT: ABNORMAL
EXT AMYLASE: ABNORMAL
EXT ANC: ABNORMAL
EXT AST: ABNORMAL
EXT BACTERIA UA: ABNORMAL
EXT BANDS%: ABNORMAL
EXT BILIRUBIN DIRECT: ABNORMAL
EXT BILIRUBIN TOTAL: ABNORMAL
EXT BK VIRUS DNA QN PCR: ABNORMAL
EXT BUN: 17 (ref 8–27)
EXT C PEPTIDE: ABNORMAL
EXT CALCIUM: 9.7 (ref 8.6–10.2)
EXT CHLORIDE: 96 (ref 96–106)
EXT CHOLESTEROL: ABNORMAL
EXT CMV DNA QUANT. BY PCR: ABNORMAL
EXT CO2: 24 (ref 20–29)
EXT CREATININE UA: ABNORMAL
EXT CREATININE: 1.02 MG/DL (ref 0.76–1.27)
EXT CYCLOSPORINE LVL: ABNORMAL
EXT EBV DNA BY PCR: ABNORMAL
EXT EBV IGG: ABNORMAL
EXT EGFR NO RACE VARIABLE: 84
EXT EOSINOPHIL%: 2
EXT FERRITIN: ABNORMAL
EXT GFR MDRD AF AMER: ABNORMAL
EXT GFR MDRD NON AF AMER: ABNORMAL
EXT GLUCOSE UA: ABNORMAL
EXT GLUCOSE: 117 (ref 70–99)
EXT HBV DNA QUANT PCR: ABNORMAL
EXT HCV QUANT: ABNORMAL
EXT HDL: ABNORMAL
EXT HEMATOCRIT: 44.5 (ref 37.5–51)
EXT HEMOGLOBIN A1C: ABNORMAL
EXT HEMOGLOBIN: 14.7 (ref 13–17.7)
EXT HIV RNA QUANT PCR: ABNORMAL
EXT IMMUNKNOW (STIMULATED): ABNORMAL
EXT IRON SATURATION: ABNORMAL
EXT LDH, TOTAL: ABNORMAL
EXT LDL CHOLESTEROL: ABNORMAL
EXT LEFLUNOMIDE METABOLITE: ABNORMAL
EXT LIPASE: ABNORMAL
EXT LYMPH%: 26
EXT MAGNESIUM: 1.6 (ref 1.6–2.3)
EXT MONOCYTES%: 13
EXT NITRITES UA: ABNORMAL
EXT PHOSPHORUS: 3.4 (ref 2.8–4.1)
EXT PLATELETS: 239 (ref 150–450)
EXT POTASSIUM: 4.6 (ref 3.5–5.2)
EXT PROT/CREAT RATIO UR: ABNORMAL
EXT PROTEIN TOTAL: ABNORMAL
EXT PROTEIN UA: ABNORMAL
EXT PTH, INTACT: ABNORMAL
EXT RBC UA: ABNORMAL
EXT SEGS%: 57
EXT SERUM IRON: ABNORMAL
EXT SIROLIMUS LVL: ABNORMAL
EXT SODIUM: 139 MMOL/L (ref 134–144)
EXT TACROLIMUS LVL: 7.7
EXT TIBC: ABNORMAL
EXT TRIGLYCERIDES: ABNORMAL
EXT URIC ACID: ABNORMAL
EXT URINE CULTURE: ABNORMAL
EXT URINE PROTEIN: ABNORMAL
EXT VIT D 25 HYDROXY: ABNORMAL
EXT WBC UA: ABNORMAL
EXT WBC: 5.9 (ref 3.4–10.8)
PARVOVIRUS B19 DNA BY PCR: ABNORMAL
QUANTIFERON GOLD TB: ABNORMAL

## 2024-06-20 ENCOUNTER — TELEPHONE (OUTPATIENT)
Dept: TRANSPLANT | Facility: CLINIC | Age: 64
End: 2024-06-20
Payer: COMMERCIAL

## 2024-07-08 ENCOUNTER — PATIENT MESSAGE (OUTPATIENT)
Dept: TRANSPLANT | Facility: CLINIC | Age: 64
End: 2024-07-08
Payer: COMMERCIAL

## 2024-07-08 DIAGNOSIS — Z79.60 LONG-TERM USE OF IMMUNOSUPPRESSANT MEDICATION: ICD-10-CM

## 2024-07-08 DIAGNOSIS — Z94.0 S/P KIDNEY TRANSPLANT: ICD-10-CM

## 2024-07-08 RX ORDER — TACROLIMUS 1 MG/1
CAPSULE ORAL
Qty: 120 CAPSULE | Refills: 11 | Status: SHIPPED | OUTPATIENT
Start: 2024-07-08 | End: 2025-07-09

## 2024-07-15 ENCOUNTER — TELEPHONE (OUTPATIENT)
Dept: TRANSPLANT | Facility: CLINIC | Age: 64
End: 2024-07-15
Payer: COMMERCIAL

## 2024-07-16 ENCOUNTER — DOCUMENTATION ONLY (OUTPATIENT)
Dept: TRANSPLANT | Facility: CLINIC | Age: 64
End: 2024-07-16

## 2024-07-19 ENCOUNTER — PATIENT MESSAGE (OUTPATIENT)
Dept: TRANSPLANT | Facility: CLINIC | Age: 64
End: 2024-07-19
Payer: COMMERCIAL

## 2024-07-22 ENCOUNTER — DOCUMENTATION ONLY (OUTPATIENT)
Dept: TRANSPLANT | Facility: CLINIC | Age: 64
End: 2024-07-22

## 2024-07-31 ENCOUNTER — OFFICE VISIT (OUTPATIENT)
Dept: TRANSPLANT | Facility: CLINIC | Age: 64
End: 2024-07-31
Payer: COMMERCIAL

## 2024-07-31 ENCOUNTER — PATIENT MESSAGE (OUTPATIENT)
Dept: TRANSPLANT | Facility: CLINIC | Age: 64
End: 2024-07-31

## 2024-07-31 DIAGNOSIS — Z94.0 S/P KIDNEY TRANSPLANT: ICD-10-CM

## 2024-07-31 DIAGNOSIS — Z79.4 TYPE 2 DIABETES MELLITUS WITH CHRONIC KIDNEY DISEASE ON CHRONIC DIALYSIS, WITH LONG-TERM CURRENT USE OF INSULIN: ICD-10-CM

## 2024-07-31 DIAGNOSIS — E83.39 HYPOPHOSPHATEMIA: ICD-10-CM

## 2024-07-31 DIAGNOSIS — Z79.60 LONG-TERM USE OF IMMUNOSUPPRESSANT MEDICATION: ICD-10-CM

## 2024-07-31 DIAGNOSIS — N18.6 TYPE 2 DIABETES MELLITUS WITH CHRONIC KIDNEY DISEASE ON CHRONIC DIALYSIS, WITH LONG-TERM CURRENT USE OF INSULIN: ICD-10-CM

## 2024-07-31 DIAGNOSIS — Z99.2 TYPE 2 DIABETES MELLITUS WITH CHRONIC KIDNEY DISEASE ON CHRONIC DIALYSIS, WITH LONG-TERM CURRENT USE OF INSULIN: ICD-10-CM

## 2024-07-31 DIAGNOSIS — I10 BENIGN HYPERTENSION: Primary | ICD-10-CM

## 2024-07-31 DIAGNOSIS — E11.22 TYPE 2 DIABETES MELLITUS WITH CHRONIC KIDNEY DISEASE ON CHRONIC DIALYSIS, WITH LONG-TERM CURRENT USE OF INSULIN: ICD-10-CM

## 2024-07-31 LAB
EXT ALBUMIN: 4.7 (ref 3.9–4.9)
EXT ALKALINE PHOSPHATASE: 85 (ref 44–121)
EXT ALT: 21 (ref 0–44)
EXT AST: 26 (ref 0–40)
EXT BILIRUBIN DIRECT: 0.18 MG/DL (ref 0–0.4)
EXT BILIRUBIN TOTAL: 0.5 (ref 0–1.2)
EXT BK VIRUS DNA QN PCR: NEGATIVE
EXT BUN: 22 (ref 8–27)
EXT CALCIUM: 9.6 (ref 8.6–10.2)
EXT CHLORIDE: 100 (ref 96–106)
EXT CO2: 22 (ref 20–29)
EXT CREATININE UA: 9.5
EXT CREATININE: 1.18 MG/DL (ref 0.76–1.27)
EXT EGFR NO RACE VARIABLE: 70
EXT EOSINOPHIL%: 3
EXT GLUCOSE UA: NEGATIVE
EXT GLUCOSE: 130 (ref 70–99)
EXT HEMATOCRIT: 48.2 (ref 37.5–51)
EXT HEMOGLOBIN: 15.4 (ref 13–17.7)
EXT LYMPH%: 22
EXT MAGNESIUM: 1.8 (ref 1.6–2.3)
EXT MONOCYTES%: 10
EXT NITRITES UA: NEGATIVE
EXT PLATELETS: 241 (ref 150–450)
EXT POTASSIUM: 5 (ref 3.5–5.2)
EXT PROT/CREAT RATIO UR: ABNORMAL
EXT PROTEIN TOTAL: 7.1 (ref 6–8.5)
EXT PROTEIN UA: NEGATIVE
EXT SEGS%: 63
EXT SODIUM: 138 MMOL/L (ref 134–144)
EXT TACROLIMUS LVL: 6
EXT URINE PROTEIN: <4
EXT WBC: 5.6 (ref 3.4–10.8)

## 2024-07-31 PROCEDURE — 99215 OFFICE O/P EST HI 40 MIN: CPT | Mod: 95,,, | Performed by: INTERNAL MEDICINE

## 2024-07-31 PROCEDURE — 3066F NEPHROPATHY DOC TX: CPT | Mod: CPTII,95,, | Performed by: INTERNAL MEDICINE

## 2024-07-31 NOTE — PROGRESS NOTES
The patient location is: Home  The chief complaint leading to consultation is: Reassessment of renal allograft function and management of chronic immunosuppression.    Visit type: audiovisual    Face to Face time with patient: 15  30 minutes of total time spent on the encounter, which includes face to face time and non-face to face time preparing to see the patient (eg, review of tests), Obtaining and/or reviewing separately obtained history, Documenting clinical information in the electronic or other health record, Independently interpreting results (not separately reported) and communicating results to the patient/family/caregiver, or Care coordination (not separately reported).         Each patient to whom he or she provides medical services by telemedicine is:  (1) informed of the relationship between the physician and patient and the respective role of any other health care provider with respect to management of the patient; and (2) notified that he or she may decline to receive medical services by telemedicine and may withdraw from such care at any time.    Notes:    Kidney Post-Transplant Assessment    Referring Physician: Jannet Farias  Current Nephrologist: Jannet Farias    ORGAN: LEFT KIDNEY  Donor Type: living  PHS Increased Risk: no  Cold Ischemia: 44 mins  Induction Medications: thymoglobulin    Subjective:     CC:  Reassessment of renal allograft function and management of chronic immunosuppression.    HPI:  Mr. Hernandez is a 63 y.o. year old White male with history of CKD secondary to IgA nephropathy and DM2 who received a living kidney transplant on 10/12/23 (Thymo induction, CMV ++).  His most recent creatinine is 1.29 . He takes mycophenolate mofetil, prednisone, and tacrolimus for maintenance immunosuppression.       His post transplant course   - perinephric hematoma requiring surgical evacuation (POD1).  - Had KEYA drain for low grade urine leak from the decapsulated kidney parenchyma  which was finally removed 11/15/23.  - Hyperkalemia: improving. On Kayexalate as needed. The insurance denied lokelma and veltassa.      Interval History: Feels great without any complaints.  BP at goal. His K around ~5 with low K diet and kayexalate as needed.  Tolerating IS without issues, no diarrhea or vomiting. he denies any chest pain, shortness of breath, ENT symptoms, nausea/vomiting, dysuria, frequency, urgency, or pain over the allograft.     He follows with local nephrologist regularly   All lab reviewed and his questions are answered     Current Outpatient Medications   Medication Sig Dispense Refill    atorvastatin (LIPITOR) 10 MG tablet Take 1 tablet (10 mg total) by mouth once daily. 30 tablet 11    calcitRIOL (ROCALTROL) 0.5 MCG Cap Take 1 capsule (0.5 mcg total) by mouth once daily. 30 capsule 11    carvediloL (COREG) 12.5 MG tablet Take 1 tablet (12.5 mg total) by mouth 2 (two) times daily. HOLD SBP <130, HR <60 60 tablet 11    DEXCOM G7 SENSOR Lady 1 Device by Misc.(Non-Drug; Combo Route) route every 10 days. 3 each 11    insulin glargine-yfgn (SEMGLEE,INSULIN GLARG-YFGN,PEN) 100 unit/mL (3 mL) InPn Inject 8 Units into the skin once daily. 3 mL 11    insulin lispro (HUMALOG KWIKPEN INSULIN) 100 unit/mL pen Inject 6 Units into the skin 3 (three) times daily with meals. PLUS CORRECTION SCALE (MAX DAILY DOSE 30 UNITS) 15 mL 2    ketoconazole (NIZORAL) 200 mg Tab Take ½ tablet (100 mg total) by mouth once daily. 30 tablet 11    magnesium oxide (MAG-OX) 400 mg (241.3 mg magnesium) tablet Take 2 tablets (800 mg total) by mouth once daily. 60 tablet 11    multivitamin Tab Take 1 tablet by mouth once daily. 30 tablet 11    mycophenolate (CELLCEPT) 250 mg Cap Take 4 capsules (1,000 mg total) by mouth 2 (two) times daily. 240 capsule 11    NIFEdipine (PROCARDIA-XL) 30 MG (OSM) 24 hr tablet Take 1 tablet (30 mg total) by mouth every 12 (twelve) hours. HOLD if systolic blood pressure <130 60 tablet 11    pen  "needle, diabetic 32 gauge x 5/32" Ndle Use 1 pen needle to inject insulin 4 (four) times daily. 100 each 11    predniSONE (DELTASONE) 5 MG tablet Take by mouth daily: 10/15 to 10/21 20 mg, 10/22 to 10/28 15 mg, 10/29 to 11/4 10 mg, 11/5/23 take 5mg daily indefinitely. DO NOT STOP 70 tablet 11    sodium polystyrene (KAYEXALATE) powder Take 30 g by mouth once daily. 908 g 11    tacrolimus (PROGRAF) 1 MG Cap Take 2 capsules (2 mg total) by mouth every morning AND 2 capsules (2 mg total) every evening. 120 capsule 11     No current facility-administered medications for this visit.       Past Medical History:   Diagnosis Date    Anxiety     Cancer     Skin Cancer    Diabetes mellitus     Diabetes mellitus, type 2     Disorder of kidney and ureter     Hyperlipidemia     Hypertension      Review of Systems   Constitutional:  Positive for fatigue. Negative for activity change, appetite change and fever.   HENT:  Negative for congestion, mouth sores and sore throat.    Eyes:  Negative for visual disturbance.   Respiratory:  Negative for cough, chest tightness and shortness of breath.    Cardiovascular:  Negative for chest pain, palpitations and leg swelling.   Gastrointestinal:  Negative for abdominal distention, abdominal pain, constipation, diarrhea and nausea.   Genitourinary:  Negative for difficulty urinating, frequency and hematuria.   Musculoskeletal:  Negative for arthralgias and gait problem.   Skin:  Negative for wound.   Allergic/Immunologic: Positive for immunocompromised state. Negative for environmental allergies and food allergies.   Neurological:  Negative for dizziness, weakness and numbness.   Psychiatric/Behavioral:  Negative for sleep disturbance. The patient is not nervous/anxious.          Labs:  Lab Results   Component Value Date    WBC 5.9 06/10/2024    HGB 14.7 06/10/2024    HCT 44.5 06/10/2024     06/10/2024    K 4.6 06/10/2024    CL 99 06/10/2024    CO2 24 06/10/2024    BUN 17 06/10/2024    " CREATININE 1.02 06/10/2024    EGFRNORACEVR 84 06/10/2024    CALCIUM 9.7 06/10/2024    PHOS 3.4 06/10/2024    MG 1.6 06/10/2024    ALBUMIN 5.0 (H) 04/15/2024    AST 23 04/15/2024    ALT 23 04/15/2024    UTPCR 0.29 (H) 11/06/2023    .0 (H) 10/12/2023    .0 (H) 10/12/2023    TACROLIMUS 7.7 06/10/2024       Labs were reviewed with the patient    Assessment:     1. Benign hypertension    2. S/P kidney transplant    3. Hypophosphatemia    4. Type 2 diabetes mellitus with chronic kidney disease on chronic dialysis, with long-term current use of insulin    5. Long-term use of immunosuppressant medication            Plan:     Plan  Decrease MMF to 500 mg after first year anniversary  Check PTH and vitamin D in 2 months or request PTH and vitamin D result from general nephrologist office       # Allograft Function: Cr 1.2-1.5.   - history of CKD secondary to IgA nephropathy and DM2 s/p  living kidney transplant on 10/12/23 (Thymo induction, CMV ++).    - post transplant course has been complicated by perinephric hematoma requiring surgical evacuation (POD1).  - last Cr 1.3  - no proteinuria       # Hx of hyperkalemia   - K improved - with low K diet and Kayexalate   - The insurance denied [employer] and veltassa. H    # Immunosuppression:   - Continue Prograf 2/2, Ketoconazole 100 mg QD, MMF 1000 mg BID, and Prednisone 5 mg QD.   - Will continue to monitor for drug toxicities    # Hypertension management:   - advise low salt diet and home BP monitoring    - Coreg 12.5 mg BID, Nifedipine 30 mg QD        # Metabolic Bone Disease/Secondary Hyperparathyroidism:  - Calcitriol 0.5 mcg QD  - Mg Oxide  800 mg BID     # BK virus infection screening:    - will continue to monitor per guidelines  - BK PCR  not detected        Gloria Turner MD       Education:   Material provided to the patient.  Patient reminded to call with any health changes since these can be early signs of significant complications.  Also, I advised  the patient to be sure any new medications or changes of old medications are discussed with either a pharmacist or physician knowledgeable with transplant to avoid rejection/drug toxicity related to significant drug interactions.    Patient advised that it is recommended that all transplanted patients, and their close contacts and household members receive Covid vaccination.

## 2024-07-31 NOTE — LETTER
August 1, 2024        Jannet Farias  [address] [place] [address]  Phone: [phone]  Fax: [phone]             [facility] 1st Fl  [address]  [facility] [address]  Phone: [phone]   Patient: Heath Hernandez   MR Number: [id number]   YOB: 1960   Date of Visit: 7/31/2024       Dear Dr. Jannet Farias    Thank you for referring Heath Hernandez to me for evaluation. Attached you will find relevant portions of my assessment and plan of care.    If you have questions, please do not hesitate to call me. I look forward to following Heath Hernandez along with you.    Sincerely,    Gloria Turner MD    Enclosure    If you would like to receive this communication electronically, please contact [email or web address] or [phone] to request [employer] Link access.    [employer] Link is a tool which provides read-only access to select patient information with whom you have a relationship. Its easy to use and provides real time access to review your patients record including encounter summaries, notes, results, and demographic information.    If you feel you have received this communication in error or would no longer like to receive these types of communications, please e-mail [email or web address]

## 2024-08-01 PROBLEM — D63.8 ANEMIA, CHRONIC DISEASE: Status: RESOLVED | Noted: 2023-10-13 | Resolved: 2024-08-01

## 2024-08-01 PROBLEM — D62 ACUTE BLOOD LOSS ANEMIA: Status: RESOLVED | Noted: 2023-10-13 | Resolved: 2024-08-01

## 2024-08-15 DIAGNOSIS — Z94.0 S/P KIDNEY TRANSPLANT: ICD-10-CM

## 2024-08-15 DIAGNOSIS — Z79.60 LONG-TERM USE OF IMMUNOSUPPRESSANT MEDICATION: ICD-10-CM

## 2024-08-15 LAB
BASOPHILS # BLD AUTO: 0 X10E3/UL (ref 0–0.2)
BASOPHILS NFR BLD AUTO: 1 %
BUN SERPL-MCNC: 16 MG/DL (ref 8–27)
BUN/CREAT SERPL: 14 (ref 10–24)
CHLORIDE SERPL-SCNC: 95 MMOL/L (ref 96–106)
CO2 SERPL-SCNC: 24 MMOL/L (ref 20–29)
CREAT SERPL-MCNC: 1.11 MG/DL (ref 0.76–1.27)
EOSINOPHIL # BLD AUTO: 0.2 X10E3/UL (ref 0–0.4)
EOSINOPHIL NFR BLD AUTO: 3 %
ERYTHROCYTE [DISTWIDTH] IN BLOOD BY AUTOMATED COUNT: 13.4 % (ref 11.6–15.4)
EST. GFR  (NO RACE VARIABLE): 76 ML/MIN/1.73
GLUCOSE SERPL-MCNC: 132 MG/DL (ref 70–99)
HCT VFR BLD AUTO: 46.2 % (ref 37.5–51)
HGB BLD-MCNC: 15 G/DL (ref 13–17.7)
IMM GRANULOCYTES # BLD AUTO: 0 X10E3/UL (ref 0–0.1)
IMM GRANULOCYTES NFR BLD AUTO: 0 %
LYMPHOCYTES # BLD AUTO: 1.2 X10E3/UL (ref 0.7–3.1)
LYMPHOCYTES NFR BLD AUTO: 21 %
MAGNESIUM SERPL-MCNC: 1.8 MG/DL (ref 1.6–2.3)
MCH RBC QN AUTO: 29.2 PG (ref 26.6–33)
MCHC RBC AUTO-ENTMCNC: 32.5 G/DL (ref 31.5–35.7)
MCV RBC AUTO: 90 FL (ref 79–97)
MONOCYTES # BLD AUTO: 0.7 X10E3/UL (ref 0.1–0.9)
MONOCYTES NFR BLD AUTO: 12 %
NEUTROPHILS # BLD AUTO: 3.6 X10E3/UL (ref 1.4–7)
NEUTROPHILS NFR BLD AUTO: 63 %
PHOSPHATE SERPL-MCNC: 3 MG/DL (ref 2.8–4.1)
PLATELET # BLD AUTO: 230 X10E3/UL (ref 150–450)
POTASSIUM SERPL-SCNC: 4.2 MMOL/L (ref 3.5–5.2)
RBC # BLD AUTO: 5.13 X10E6/UL (ref 4.14–5.8)
SODIUM SERPL-SCNC: 133 MMOL/L (ref 134–144)
TACROLIMUS BLD LC/MS/MS-MCNC: 4.9 NG/ML (ref 2–20)
WBC # BLD AUTO: 5.8 X10E3/UL (ref 3.4–10.8)

## 2024-08-15 RX ORDER — TACROLIMUS 1 MG/1
CAPSULE ORAL
Qty: 150 CAPSULE | Refills: 11 | Status: SHIPPED | OUTPATIENT
Start: 2024-08-15 | End: 2025-08-16

## 2024-08-15 NOTE — PROGRESS NOTES
Na 133: avoid overhydration. How much fluid does he take per day?  Tac to 3/2 mg if it is a true level. Check tac level in a week  No

## 2024-08-15 NOTE — TELEPHONE ENCOUNTER
Message sent. Labs 8/26----- Message from Gloria Turner MD sent at 8/15/2024 10:56 AM CDT -----  Na 133: avoid overhydration. How much fluid does he take per day?  Tac to 3/2 mg if it is a true level. Check tac level in a week

## 2024-08-28 LAB — TACROLIMUS BLD LC/MS/MS-MCNC: 12.1 NG/ML (ref 2–20)

## 2024-09-18 ENCOUNTER — DOCUMENTATION ONLY (OUTPATIENT)
Dept: TRANSPLANT | Facility: CLINIC | Age: 64
End: 2024-09-18
Payer: COMMERCIAL

## 2024-09-18 LAB
EXT BUN: 17 (ref 8–27)
EXT CALCIUM: 9.4 (ref 8.6–10.2)
EXT CHLORIDE: 97 (ref 96–106)
EXT CO2: 24 (ref 20–29)
EXT CREATININE: 1.17 MG/DL (ref 0.76–1.27)
EXT EGFR NO RACE VARIABLE: 71
EXT EOSINOPHIL%: 2
EXT GLUCOSE: 118 (ref 70–99)
EXT HEMATOCRIT: 43.7 (ref 37.5–51)
EXT HEMOGLOBIN: 14.4 (ref 13–17.7)
EXT LYMPH%: 24
EXT MAGNESIUM: 1.8 (ref 1.6–2.3)
EXT MONOCYTES%: 12
EXT PHOSPHORUS: 2.9 (ref 2.8–4.1)
EXT PLATELETS: 275 (ref 150–450)
EXT POTASSIUM: 4.9 (ref 3.5–5.2)
EXT SEGS%: 60
EXT SODIUM: 136 MMOL/L (ref 134–144)
EXT TACROLIMUS LVL: 5.6 (ref 2–20)
EXT WBC: 5.6 (ref 3.4–10.8)

## 2024-09-19 LAB
BASOPHILS # BLD AUTO: 0 X10E3/UL (ref 0–0.2)
BASOPHILS NFR BLD AUTO: 1 %
BUN SERPL-MCNC: 17 MG/DL (ref 8–27)
BUN/CREAT SERPL: 15 (ref 10–24)
CALCIUM SERPL-MCNC: 9.4 MG/DL (ref 8.6–10.2)
CHLORIDE SERPL-SCNC: 97 MMOL/L (ref 96–106)
CO2 SERPL-SCNC: 24 MMOL/L (ref 20–29)
CREAT SERPL-MCNC: 1.17 MG/DL (ref 0.76–1.27)
EOSINOPHIL # BLD AUTO: 0.1 X10E3/UL (ref 0–0.4)
EOSINOPHIL NFR BLD AUTO: 2 %
ERYTHROCYTE [DISTWIDTH] IN BLOOD BY AUTOMATED COUNT: 13.2 % (ref 11.6–15.4)
EST. GFR  (NO RACE VARIABLE): 71 ML/MIN/1.73
GLUCOSE SERPL-MCNC: 118 MG/DL (ref 70–99)
HCT VFR BLD AUTO: 43.7 % (ref 37.5–51)
HGB BLD-MCNC: 14.4 G/DL (ref 13–17.7)
IMM GRANULOCYTES # BLD AUTO: 0 X10E3/UL (ref 0–0.1)
IMM GRANULOCYTES NFR BLD AUTO: 1 %
LYMPHOCYTES # BLD AUTO: 1.4 X10E3/UL (ref 0.7–3.1)
LYMPHOCYTES NFR BLD AUTO: 24 %
MAGNESIUM SERPL-MCNC: 1.8 MG/DL (ref 1.6–2.3)
MCH RBC QN AUTO: 29.7 PG (ref 26.6–33)
MCHC RBC AUTO-ENTMCNC: 33 G/DL (ref 31.5–35.7)
MCV RBC AUTO: 90 FL (ref 79–97)
MONOCYTES # BLD AUTO: 0.7 X10E3/UL (ref 0.1–0.9)
MONOCYTES NFR BLD AUTO: 12 %
NEUTROPHILS # BLD AUTO: 3.3 X10E3/UL (ref 1.4–7)
NEUTROPHILS NFR BLD AUTO: 60 %
PHOSPHATE SERPL-MCNC: 2.9 MG/DL (ref 2.8–4.1)
PLATELET # BLD AUTO: 275 X10E3/UL (ref 150–450)
POTASSIUM SERPL-SCNC: 4.9 MMOL/L (ref 3.5–5.2)
RBC # BLD AUTO: 4.85 X10E6/UL (ref 4.14–5.8)
SODIUM SERPL-SCNC: 136 MMOL/L (ref 134–144)
TACROLIMUS BLD LC/MS/MS-MCNC: 5.6 NG/ML (ref 2–20)
WBC # BLD AUTO: 5.6 X10E3/UL (ref 3.4–10.8)

## 2024-10-16 LAB
ALBUMIN SERPL-MCNC: 4.5 G/DL (ref 3.9–4.9)
ALP SERPL-CCNC: 114 IU/L (ref 44–121)
ALT SERPL-CCNC: 31 IU/L (ref 0–44)
APPEARANCE UR: CLEAR
AST SERPL-CCNC: 31 IU/L (ref 0–40)
BASOPHILS # BLD AUTO: 0.1 X10E3/UL (ref 0–0.2)
BASOPHILS NFR BLD AUTO: 1 %
BILIRUB DIRECT SERPL-MCNC: 0.14 MG/DL (ref 0–0.4)
BILIRUB SERPL-MCNC: 0.4 MG/DL (ref 0–1.2)
BILIRUB UR QL STRIP: NEGATIVE
BKV DNA SPEC NAA+PROBE-ACNC: NEGATIVE IU/ML
BUN SERPL-MCNC: 26 MG/DL (ref 8–27)
BUN/CREAT SERPL: 22 (ref 10–24)
CHLORIDE SERPL-SCNC: 97 MMOL/L (ref 96–106)
CO2 SERPL-SCNC: 24 MMOL/L (ref 20–29)
COLOR UR: YELLOW
CREAT SERPL-MCNC: 1.16 MG/DL (ref 0.76–1.27)
CREAT UR-MCNC: 10 MG/DL
EOSINOPHIL # BLD AUTO: 0.1 X10E3/UL (ref 0–0.4)
EOSINOPHIL NFR BLD AUTO: 2 %
ERYTHROCYTE [DISTWIDTH] IN BLOOD BY AUTOMATED COUNT: 12.8 % (ref 11.6–15.4)
EST. GFR  (NO RACE VARIABLE): 71 ML/MIN/1.73
GLUCOSE SERPL-MCNC: 127 MG/DL (ref 70–99)
GLUCOSE UR QL STRIP: NEGATIVE
HCT VFR BLD AUTO: 46.7 % (ref 37.5–51)
HGB BLD-MCNC: 15.2 G/DL (ref 13–17.7)
HGB UR QL STRIP: NEGATIVE
IMM GRANULOCYTES # BLD AUTO: 0.1 X10E3/UL (ref 0–0.1)
IMM GRANULOCYTES NFR BLD AUTO: 1 %
KETONES UR QL STRIP: NEGATIVE
LEUKOCYTE ESTERASE UR QL STRIP: NEGATIVE
LYMPHOCYTES # BLD AUTO: 2.1 X10E3/UL (ref 0.7–3.1)
LYMPHOCYTES NFR BLD AUTO: 29 %
MAGNESIUM SERPL-MCNC: 1.8 MG/DL (ref 1.6–2.3)
MCH RBC QN AUTO: 29.1 PG (ref 26.6–33)
MCHC RBC AUTO-ENTMCNC: 32.5 G/DL (ref 31.5–35.7)
MCV RBC AUTO: 89 FL (ref 79–97)
MICRO URNS: NORMAL
MONOCYTES # BLD AUTO: 0.8 X10E3/UL (ref 0.1–0.9)
MONOCYTES NFR BLD AUTO: 11 %
NEUTROPHILS # BLD AUTO: 4.1 X10E3/UL (ref 1.4–7)
NEUTROPHILS NFR BLD AUTO: 56 %
NITRITE UR QL STRIP: NEGATIVE
PH UR STRIP: 6.5 [PH] (ref 5–7.5)
PHOSPHATE SERPL-MCNC: 3.7 MG/DL (ref 2.8–4.1)
PLATELET # BLD AUTO: 282 X10E3/UL (ref 150–450)
POTASSIUM SERPL-SCNC: 4.4 MMOL/L (ref 3.5–5.2)
PROT SERPL-MCNC: 7.2 G/DL (ref 6–8.5)
PROT UR QL STRIP: NEGATIVE
PROT UR-MCNC: <4 MG/DL
PROT/CREAT UR: ABNORMAL MG/G CREAT (ref 0–200)
RBC # BLD AUTO: 5.23 X10E6/UL (ref 4.14–5.8)
SODIUM SERPL-SCNC: 136 MMOL/L (ref 134–144)
SP GR UR STRIP: 1 (ref 1–1.03)
SPECIMEN STATUS REPORT: NORMAL
TACROLIMUS BLD LC/MS/MS-MCNC: 5.4 NG/ML (ref 2–20)
UROBILINOGEN UR STRIP-MCNC: 0.2 MG/DL (ref 0.2–1)
WBC # BLD AUTO: 7.2 X10E3/UL (ref 3.4–10.8)

## 2024-10-25 ENCOUNTER — PATIENT MESSAGE (OUTPATIENT)
Dept: TRANSPLANT | Facility: CLINIC | Age: 64
End: 2024-10-25
Payer: COMMERCIAL

## 2024-10-29 ENCOUNTER — TELEPHONE (OUTPATIENT)
Dept: TRANSPLANT | Facility: CLINIC | Age: 64
End: 2024-10-29
Payer: COMMERCIAL

## 2024-10-30 ENCOUNTER — TELEPHONE (OUTPATIENT)
Dept: TRANSPLANT | Facility: CLINIC | Age: 64
End: 2024-10-30
Payer: COMMERCIAL

## 2025-02-06 DIAGNOSIS — Z94.0 S/P KIDNEY TRANSPLANT: ICD-10-CM

## 2025-02-06 RX ORDER — MYCOPHENOLATE MOFETIL 250 MG/1
1000 CAPSULE ORAL 2 TIMES DAILY
Qty: 240 CAPSULE | Refills: 11 | Status: SHIPPED | OUTPATIENT
Start: 2025-02-13

## 2025-02-06 NOTE — TELEPHONE ENCOUNTER
Called pt with no answer.   Called wife verbalized understanding that pt is to NOT take paxlovid. She states it wasn't ready in the pharmacy--and does not believe he took it.     Lab order emailed to wife per request.   MMF to be held for 7 days.       ----- Message from Gloria Turner MD sent at 2/6/2025  1:49 PM CST -----  Pt has COVID infection. Prescribed paxlovid  by his doctor this AM. His doctor called me to check on IS meds. I suggested not to start paxlovid- not sure if pt received the fist dose or not.    Please   No paxlovid   Hold MMF X 7 days   Prograf level tomorrow

## 2025-02-10 LAB — TACROLIMUS BLD LC/MS/MS-MCNC: 5 NG/ML (ref 5–20)

## 2025-04-02 ENCOUNTER — TELEPHONE (OUTPATIENT)
Dept: TRANSPLANT | Facility: CLINIC | Age: 65
End: 2025-04-02
Payer: COMMERCIAL

## (undated) DEVICE — SUT PDS BV 6-0

## (undated) DEVICE — ELECTRODE REM PLYHSV RETURN 9

## (undated) DEVICE — FOLEY BLLN 20FR 3WAY 5CC

## (undated) DEVICE — SUT 1 36IN PDS II VIO MONO

## (undated) DEVICE — SUT 0 8-27IN VICRYL PL CT-1

## (undated) DEVICE — DRESSING ABSRBNT ISLAND 3.6X8

## (undated) DEVICE — EVACUATOR WOUND BULB 100CC

## (undated) DEVICE — DRESSING TRANS 4X4 TEGADERM

## (undated) DEVICE — PUNCH AORTIC 4.8MM

## (undated) DEVICE — SUT 3-0 12-18IN SILK

## (undated) DEVICE — DRAPE STERI INSTRUMENT 1018

## (undated) DEVICE — DRAPE INCISE IOBAN 2 23X17IN

## (undated) DEVICE — TRAY CATH FOL SIL URIMTR 16FR

## (undated) DEVICE — SUT 4-0 12-18IN SILK BLACK

## (undated) DEVICE — PATCH SEALANT EVARREST 2X4

## (undated) DEVICE — SEE MEDLINE ITEM 156902

## (undated) DEVICE — HANDSET ARGON PLUS

## (undated) DEVICE — DRAPE LAP T SHT W/ INSTR PAD

## (undated) DEVICE — SUT PROLENE 5-0 36IN C-1

## (undated) DEVICE — KIT SURGIFLO HEMOSTATIC MATRIX

## (undated) DEVICE — FIBRILLAR ABS HEMOSTAT 4X4

## (undated) DEVICE — SUT SILK 3-0 STRANDS 30IN

## (undated) DEVICE — SOL NS 1000CC

## (undated) DEVICE — KIT COLLECTION E SWAB REGULAR

## (undated) DEVICE — SUT MCRYL PLUS 4-0 PS2 27IN

## (undated) DEVICE — GAUZE DRAIN N WVN 6PLY 4X4IN

## (undated) DEVICE — TRAY MINOR GEN SURG OMC

## (undated) DEVICE — STAPLER SKIN PROXIMATE WIDE

## (undated) DEVICE — CLIPPER BLADE MOD 4406 (CAREF)

## (undated) DEVICE — SUT ETHILON 3-0 PS2 18 BLK

## (undated) DEVICE — DRAIN CHANNEL ROUND 19FR

## (undated) DEVICE — ADHESIVE DERMABOND ADVANCED

## (undated) DEVICE — SET IRR URLGY 2LINE UNIV SPIKE

## (undated) DEVICE — SOL NACL IRR 1000ML BTL

## (undated) DEVICE — HEMOSTAT SURGICEL NU-KNIT 6X9

## (undated) DEVICE — SUT PROLENE 6-0 BV-1 30IN

## (undated) DEVICE — BLADE SURG CARBON STEEL SZ11

## (undated) DEVICE — HANDPIECE ABC SNGL FUNC DISP

## (undated) DEVICE — SPONGE GAUZE 16PLY 4X4

## (undated) DEVICE — PACK KIDNEY TRANSPLANT CUSTOM

## (undated) DEVICE — PLUG CATHETER STERILE FOLEY

## (undated) DEVICE — TOWEL OR XRAY WHITE 17X26IN

## (undated) DEVICE — GOWN SURGICAL X-LARGE

## (undated) DEVICE — SYR ONLY LUER LOCK 20CC

## (undated) DEVICE — DECANTER FLUID TRNSF WHITE 9IN

## (undated) DEVICE — APPLICATOR CHLORAPREP ORN 26ML

## (undated) DEVICE — Device

## (undated) DEVICE — PACK UNIVERSAL SPLIT II

## (undated) DEVICE — TIP YANKAUERS BULB NO VENT

## (undated) DEVICE — SPONGE LAP 18X18 PREWASHED

## (undated) DEVICE — SUT 2-0 12-18IN SILK

## (undated) DEVICE — SUT SILK 2-0 STRANDS 30IN